# Patient Record
Sex: FEMALE | Race: WHITE | Employment: OTHER | URBAN - METROPOLITAN AREA
[De-identification: names, ages, dates, MRNs, and addresses within clinical notes are randomized per-mention and may not be internally consistent; named-entity substitution may affect disease eponyms.]

---

## 2017-01-13 DIAGNOSIS — I10 ESSENTIAL HYPERTENSION: ICD-10-CM

## 2017-01-13 RX ORDER — AMLODIPINE BESYLATE 10 MG/1
TABLET ORAL
Qty: 30 TABLET | Refills: 0 | Status: SHIPPED | OUTPATIENT
Start: 2017-01-13 | End: 2017-02-14 | Stop reason: SDUPTHER

## 2017-02-14 DIAGNOSIS — I10 ESSENTIAL HYPERTENSION: ICD-10-CM

## 2017-02-14 RX ORDER — AMLODIPINE BESYLATE 10 MG/1
TABLET ORAL
Qty: 30 TABLET | Refills: 0 | Status: SHIPPED | OUTPATIENT
Start: 2017-02-14 | End: 2017-03-17 | Stop reason: SDUPTHER

## 2017-02-14 RX ORDER — TIOTROPIUM BROMIDE 18 UG/1
CAPSULE ORAL; RESPIRATORY (INHALATION)
Qty: 30 CAPSULE | Refills: 0 | Status: SHIPPED | OUTPATIENT
Start: 2017-02-14 | End: 2017-03-17 | Stop reason: SDUPTHER

## 2017-03-17 DIAGNOSIS — I10 ESSENTIAL HYPERTENSION: ICD-10-CM

## 2017-03-17 RX ORDER — AMLODIPINE BESYLATE 10 MG/1
TABLET ORAL
Qty: 30 TABLET | Refills: 0 | Status: SHIPPED | OUTPATIENT
Start: 2017-03-17

## 2017-03-17 RX ORDER — TIOTROPIUM BROMIDE 18 UG/1
CAPSULE ORAL; RESPIRATORY (INHALATION)
Qty: 30 CAPSULE | Refills: 0 | Status: SHIPPED | OUTPATIENT
Start: 2017-03-17

## 2017-03-29 DIAGNOSIS — J44.1 COPD EXACERBATION (HCC): Primary | ICD-10-CM

## 2017-05-15 ENCOUNTER — HOSPITAL ENCOUNTER (OUTPATIENT)
Dept: PULMONOLOGY | Age: 77
Discharge: HOME | End: 2017-05-15
Attending: INTERNAL MEDICINE | Admitting: INTERNAL MEDICINE

## 2017-05-15 ENCOUNTER — OFFICE VISIT (OUTPATIENT)
Dept: PULMONOLOGY | Age: 77
End: 2017-05-15

## 2017-05-15 VITALS
BODY MASS INDEX: 34.91 KG/M2 | DIASTOLIC BLOOD PRESSURE: 62 MMHG | WEIGHT: 197 LBS | OXYGEN SATURATION: 92 % | TEMPERATURE: 97.7 F | HEART RATE: 108 BPM | HEIGHT: 63 IN | RESPIRATION RATE: 20 BRPM | SYSTOLIC BLOOD PRESSURE: 168 MMHG

## 2017-05-15 DIAGNOSIS — R91.1 LUNG NODULE: ICD-10-CM

## 2017-05-15 DIAGNOSIS — J96.11 CHRONIC RESPIRATORY FAILURE WITH HYPOXIA (HCC): ICD-10-CM

## 2017-05-15 DIAGNOSIS — J44.1 CHRONIC OBSTRUCTIVE PULMONARY DISEASE WITH ACUTE EXACERBATION (HCC): ICD-10-CM

## 2017-05-15 DIAGNOSIS — J43.2 CENTRILOBULAR EMPHYSEMA (HCC): Primary | ICD-10-CM

## 2017-05-15 LAB
DLCO %PRED: NORMAL
DLCO PRE: NORMAL
FEF 25-75%-POST: NORMAL
FEF 25-75%-PRE: NORMAL
FEV1-POST: NORMAL
FEV1-PRE: NORMAL
FEV1/FVC-POST: NORMAL
FEV1/FVC-PRE: NORMAL
FVC-POST: NORMAL
FVC-PRE: NORMAL
MEP: NORMAL
MIP: NORMAL
MVV %PRED-PRE: NORMAL
MVV-PRE: NORMAL
TLC %PRED: NORMAL
TLC PRE: NORMAL

## 2017-05-15 PROCEDURE — 3023F SPIROM DOC REV: CPT | Performed by: INTERNAL MEDICINE

## 2017-05-15 PROCEDURE — 99205 OFFICE O/P NEW HI 60 MIN: CPT | Performed by: INTERNAL MEDICINE

## 2017-05-15 PROCEDURE — 1090F PRES/ABSN URINE INCON ASSESS: CPT | Performed by: INTERNAL MEDICINE

## 2017-05-15 PROCEDURE — 94727 GAS DIL/WSHOT DETER LNG VOL: CPT | Performed by: INTERNAL MEDICINE

## 2017-05-15 PROCEDURE — G8926 SPIRO NO PERF OR DOC: HCPCS | Performed by: INTERNAL MEDICINE

## 2017-05-15 PROCEDURE — 1036F TOBACCO NON-USER: CPT | Performed by: INTERNAL MEDICINE

## 2017-05-15 PROCEDURE — G8417 CALC BMI ABV UP PARAM F/U: HCPCS | Performed by: INTERNAL MEDICINE

## 2017-05-15 PROCEDURE — G8427 DOCREV CUR MEDS BY ELIG CLIN: HCPCS | Performed by: INTERNAL MEDICINE

## 2017-05-15 PROCEDURE — 1123F ACP DISCUSS/DSCN MKR DOCD: CPT | Performed by: INTERNAL MEDICINE

## 2017-05-15 PROCEDURE — 94060 EVALUATION OF WHEEZING: CPT | Performed by: INTERNAL MEDICINE

## 2017-05-15 PROCEDURE — 94729 DIFFUSING CAPACITY: CPT | Performed by: INTERNAL MEDICINE

## 2017-05-15 PROCEDURE — G8400 PT W/DXA NO RESULTS DOC: HCPCS | Performed by: INTERNAL MEDICINE

## 2017-05-15 PROCEDURE — 4040F PNEUMOC VAC/ADMIN/RCVD: CPT | Performed by: INTERNAL MEDICINE

## 2017-05-15 RX ORDER — ALBUTEROL SULFATE 90 UG/1
4 AEROSOL, METERED RESPIRATORY (INHALATION) ONCE
Status: COMPLETED | OUTPATIENT
Start: 2017-05-15 | End: 2017-05-15

## 2017-05-15 RX ORDER — ALBUTEROL SULFATE 90 UG/1
2 AEROSOL, METERED RESPIRATORY (INHALATION) EVERY 4 HOURS PRN
Qty: 1 INHALER | Refills: 11 | Status: SHIPPED | OUTPATIENT
Start: 2017-05-15

## 2017-05-15 RX ORDER — LEVOFLOXACIN 500 MG/1
500 TABLET, FILM COATED ORAL DAILY
Qty: 5 TABLET | Refills: 0 | Status: SHIPPED | OUTPATIENT
Start: 2017-05-15 | End: 2017-05-20

## 2017-05-15 RX ORDER — ALBUTEROL SULFATE 2.5 MG/3ML
2.5 SOLUTION RESPIRATORY (INHALATION) EVERY 4 HOURS PRN
Qty: 360 ML | Refills: 11 | Status: CANCELLED | OUTPATIENT
Start: 2017-05-15

## 2017-05-15 RX ORDER — HYDROCHLOROTHIAZIDE 25 MG/1
25 TABLET ORAL DAILY
COMMUNITY
End: 2020-11-21

## 2017-05-15 RX ORDER — PREDNISONE 20 MG/1
40 TABLET ORAL DAILY
Qty: 10 TABLET | Refills: 0 | Status: SHIPPED | OUTPATIENT
Start: 2017-05-15 | End: 2017-05-20

## 2017-05-15 RX ADMIN — ALBUTEROL SULFATE 4 PUFF: 90 AEROSOL, METERED RESPIRATORY (INHALATION) at 09:37

## 2017-05-23 ENCOUNTER — HOSPITAL ENCOUNTER (OUTPATIENT)
Dept: CARDIAC REHAB | Age: 77
Discharge: HOME | End: 2017-05-23
Attending: INTERNAL MEDICINE | Admitting: INTERNAL MEDICINE

## 2017-05-23 ENCOUNTER — HOSPITAL ENCOUNTER (OUTPATIENT)
Dept: CT IMAGING | Age: 77
Discharge: HOME | End: 2017-05-23
Attending: INTERNAL MEDICINE | Admitting: INTERNAL MEDICINE

## 2017-05-23 DIAGNOSIS — R91.1 LUNG NODULE: ICD-10-CM

## 2017-05-23 DIAGNOSIS — R91.1 SOLITARY PULMONARY NODULE: ICD-10-CM

## 2017-05-23 PROCEDURE — 94620 PR PULMONARY STRESS TESTING,SIMPLE: CPT | Performed by: INTERNAL MEDICINE

## 2017-05-24 ENCOUNTER — TELEPHONE (OUTPATIENT)
Dept: PULMONOLOGY | Age: 77
End: 2017-05-24

## 2020-09-18 ENCOUNTER — HOSPITAL ENCOUNTER (INPATIENT)
Age: 80
LOS: 5 days | Discharge: HOME | DRG: 445 | End: 2020-09-23
Attending: EMERGENCY MEDICINE | Admitting: FAMILY MEDICINE
Payer: MEDICARE

## 2020-09-18 ENCOUNTER — APPOINTMENT (OUTPATIENT)
Dept: CT IMAGING | Age: 80
DRG: 445 | End: 2020-09-18
Payer: MEDICARE

## 2020-09-18 ENCOUNTER — APPOINTMENT (OUTPATIENT)
Dept: ULTRASOUND IMAGING | Age: 80
DRG: 445 | End: 2020-09-18
Payer: MEDICARE

## 2020-09-18 PROBLEM — K81.9 CHOLECYSTITIS: Status: ACTIVE | Noted: 2020-09-18

## 2020-09-18 LAB
A/G RATIO: 1.4 (ref 1.1–2.2)
ALBUMIN SERPL-MCNC: 4.1 G/DL (ref 3.4–5)
ALP BLD-CCNC: 114 U/L (ref 40–129)
ALT SERPL-CCNC: 12 U/L (ref 10–40)
ANION GAP SERPL CALCULATED.3IONS-SCNC: 8 MMOL/L (ref 3–16)
ANISOCYTOSIS: ABNORMAL
AST SERPL-CCNC: 16 U/L (ref 15–37)
BACTERIA: ABNORMAL /HPF
BASOPHILS ABSOLUTE: 0.1 K/UL (ref 0–0.2)
BASOPHILS RELATIVE PERCENT: 0.7 %
BILIRUB SERPL-MCNC: 0.4 MG/DL (ref 0–1)
BILIRUBIN URINE: NEGATIVE
BLOOD, URINE: NEGATIVE
BUN BLDV-MCNC: 19 MG/DL (ref 7–20)
CALCIUM SERPL-MCNC: 9.5 MG/DL (ref 8.3–10.6)
CHLORIDE BLD-SCNC: 94 MMOL/L (ref 99–110)
CLARITY: ABNORMAL
CO2: 35 MMOL/L (ref 21–32)
COLOR: YELLOW
CREAT SERPL-MCNC: 0.9 MG/DL (ref 0.6–1.2)
EKG ATRIAL RATE: 82 BPM
EKG DIAGNOSIS: NORMAL
EKG P AXIS: 61 DEGREES
EKG P-R INTERVAL: 178 MS
EKG Q-T INTERVAL: 392 MS
EKG QRS DURATION: 80 MS
EKG QTC CALCULATION (BAZETT): 457 MS
EKG R AXIS: 20 DEGREES
EKG T AXIS: 74 DEGREES
EKG VENTRICULAR RATE: 82 BPM
EOSINOPHILS ABSOLUTE: 0 K/UL (ref 0–0.6)
EOSINOPHILS RELATIVE PERCENT: 0.2 %
EPITHELIAL CELLS, UA: 7 /HPF (ref 0–5)
GFR AFRICAN AMERICAN: >60
GFR NON-AFRICAN AMERICAN: >60
GLOBULIN: 3 G/DL
GLUCOSE BLD-MCNC: 127 MG/DL (ref 70–99)
GLUCOSE URINE: NEGATIVE MG/DL
HCT VFR BLD CALC: 37.3 % (ref 36–48)
HEMOGLOBIN: 12.6 G/DL (ref 12–16)
HYALINE CASTS: 11 /LPF (ref 0–8)
KETONES, URINE: NEGATIVE MG/DL
LEUKOCYTE ESTERASE, URINE: ABNORMAL
LIPASE: 30 U/L (ref 13–60)
LYMPHOCYTES ABSOLUTE: 0.9 K/UL (ref 1–5.1)
LYMPHOCYTES RELATIVE PERCENT: 11.5 %
MCH RBC QN AUTO: 28.3 PG (ref 26–34)
MCHC RBC AUTO-ENTMCNC: 33.7 G/DL (ref 31–36)
MCV RBC AUTO: 83.8 FL (ref 80–100)
MICROSCOPIC EXAMINATION: YES
MONOCYTES ABSOLUTE: 0.5 K/UL (ref 0–1.3)
MONOCYTES RELATIVE PERCENT: 6.1 %
NEUTROPHILS ABSOLUTE: 6.6 K/UL (ref 1.7–7.7)
NEUTROPHILS RELATIVE PERCENT: 81.5 %
NITRITE, URINE: NEGATIVE
PDW BLD-RTO: 14.5 % (ref 12.4–15.4)
PH UA: 6.5 (ref 5–8)
PLATELET # BLD: 46 K/UL (ref 135–450)
PLATELET SLIDE REVIEW: ABNORMAL
PMV BLD AUTO: 13.2 FL (ref 5–10.5)
POTASSIUM SERPL-SCNC: 3.1 MMOL/L (ref 3.5–5.1)
PROTEIN UA: NEGATIVE MG/DL
RBC # BLD: 4.45 M/UL (ref 4–5.2)
RBC UA: 2 /HPF (ref 0–4)
REASON FOR REJECTION: NORMAL
REJECTED TEST: NORMAL
SLIDE REVIEW: ABNORMAL
SODIUM BLD-SCNC: 137 MMOL/L (ref 136–145)
SPECIFIC GRAVITY UA: 1.02 (ref 1–1.03)
TOTAL PROTEIN: 7.1 G/DL (ref 6.4–8.2)
TROPONIN: <0.01 NG/ML
URINE REFLEX TO CULTURE: YES
URINE TYPE: ABNORMAL
UROBILINOGEN, URINE: 0.2 E.U./DL
WBC # BLD: 8.1 K/UL (ref 4–11)
WBC UA: 61 /HPF (ref 0–5)

## 2020-09-18 PROCEDURE — 99222 1ST HOSP IP/OBS MODERATE 55: CPT | Performed by: SURGERY

## 2020-09-18 PROCEDURE — 81001 URINALYSIS AUTO W/SCOPE: CPT

## 2020-09-18 PROCEDURE — 2500000003 HC RX 250 WO HCPCS: Performed by: SURGERY

## 2020-09-18 PROCEDURE — 6360000004 HC RX CONTRAST MEDICATION: Performed by: NURSE PRACTITIONER

## 2020-09-18 PROCEDURE — 2500000003 HC RX 250 WO HCPCS: Performed by: EMERGENCY MEDICINE

## 2020-09-18 PROCEDURE — 87040 BLOOD CULTURE FOR BACTERIA: CPT

## 2020-09-18 PROCEDURE — APPSS60 APP SPLIT SHARED TIME 46-60 MINUTES: Performed by: NURSE PRACTITIONER

## 2020-09-18 PROCEDURE — 74177 CT ABD & PELVIS W/CONTRAST: CPT

## 2020-09-18 PROCEDURE — 96374 THER/PROPH/DIAG INJ IV PUSH: CPT

## 2020-09-18 PROCEDURE — 6370000000 HC RX 637 (ALT 250 FOR IP): Performed by: NURSE PRACTITIONER

## 2020-09-18 PROCEDURE — 83690 ASSAY OF LIPASE: CPT

## 2020-09-18 PROCEDURE — 93005 ELECTROCARDIOGRAM TRACING: CPT | Performed by: NURSE PRACTITIONER

## 2020-09-18 PROCEDURE — 6360000002 HC RX W HCPCS: Performed by: FAMILY MEDICINE

## 2020-09-18 PROCEDURE — 6370000000 HC RX 637 (ALT 250 FOR IP): Performed by: FAMILY MEDICINE

## 2020-09-18 PROCEDURE — 96375 TX/PRO/DX INJ NEW DRUG ADDON: CPT

## 2020-09-18 PROCEDURE — 87186 SC STD MICRODIL/AGAR DIL: CPT

## 2020-09-18 PROCEDURE — 99285 EMERGENCY DEPT VISIT HI MDM: CPT

## 2020-09-18 PROCEDURE — 2580000003 HC RX 258: Performed by: FAMILY MEDICINE

## 2020-09-18 PROCEDURE — 6360000002 HC RX W HCPCS: Performed by: SURGERY

## 2020-09-18 PROCEDURE — 87086 URINE CULTURE/COLONY COUNT: CPT

## 2020-09-18 PROCEDURE — 85025 COMPLETE CBC W/AUTO DIFF WBC: CPT

## 2020-09-18 PROCEDURE — APPNB30 APP NON BILLABLE TIME 0-30 MINS: Performed by: NURSE PRACTITIONER

## 2020-09-18 PROCEDURE — 76705 ECHO EXAM OF ABDOMEN: CPT

## 2020-09-18 PROCEDURE — 84484 ASSAY OF TROPONIN QUANT: CPT

## 2020-09-18 PROCEDURE — 6360000002 HC RX W HCPCS: Performed by: NURSE PRACTITIONER

## 2020-09-18 PROCEDURE — 80053 COMPREHEN METABOLIC PANEL: CPT

## 2020-09-18 PROCEDURE — 1200000000 HC SEMI PRIVATE

## 2020-09-18 PROCEDURE — 87077 CULTURE AEROBIC IDENTIFY: CPT

## 2020-09-18 PROCEDURE — 36415 COLL VENOUS BLD VENIPUNCTURE: CPT

## 2020-09-18 PROCEDURE — 93010 ELECTROCARDIOGRAM REPORT: CPT | Performed by: INTERNAL MEDICINE

## 2020-09-18 PROCEDURE — 2580000003 HC RX 258: Performed by: NURSE PRACTITIONER

## 2020-09-18 RX ORDER — ONDANSETRON 2 MG/ML
4 INJECTION INTRAMUSCULAR; INTRAVENOUS EVERY 6 HOURS PRN
Status: DISCONTINUED | OUTPATIENT
Start: 2020-09-18 | End: 2020-09-23 | Stop reason: HOSPADM

## 2020-09-18 RX ORDER — MORPHINE SULFATE 4 MG/ML
4 INJECTION, SOLUTION INTRAMUSCULAR; INTRAVENOUS EVERY 4 HOURS PRN
Status: DISCONTINUED | OUTPATIENT
Start: 2020-09-18 | End: 2020-09-23 | Stop reason: HOSPADM

## 2020-09-18 RX ORDER — POTASSIUM CHLORIDE 20 MEQ/1
40 TABLET, EXTENDED RELEASE ORAL 2 TIMES DAILY WITH MEALS
Status: DISCONTINUED | OUTPATIENT
Start: 2020-09-18 | End: 2020-09-23 | Stop reason: HOSPADM

## 2020-09-18 RX ORDER — LEVOFLOXACIN 5 MG/ML
500 INJECTION, SOLUTION INTRAVENOUS EVERY 24 HOURS
Status: DISCONTINUED | OUTPATIENT
Start: 2020-09-18 | End: 2020-09-18 | Stop reason: ALTCHOICE

## 2020-09-18 RX ORDER — SODIUM CHLORIDE 9 MG/ML
INJECTION, SOLUTION INTRAVENOUS CONTINUOUS
Status: DISCONTINUED | OUTPATIENT
Start: 2020-09-18 | End: 2020-09-22

## 2020-09-18 RX ORDER — BUDESONIDE AND FORMOTEROL FUMARATE DIHYDRATE 160; 4.5 UG/1; UG/1
2 AEROSOL RESPIRATORY (INHALATION) 2 TIMES DAILY
Status: DISCONTINUED | OUTPATIENT
Start: 2020-09-18 | End: 2020-09-23 | Stop reason: HOSPADM

## 2020-09-18 RX ORDER — SODIUM CHLORIDE 0.9 % (FLUSH) 0.9 %
10 SYRINGE (ML) INJECTION EVERY 12 HOURS SCHEDULED
Status: DISCONTINUED | OUTPATIENT
Start: 2020-09-18 | End: 2020-09-23 | Stop reason: HOSPADM

## 2020-09-18 RX ORDER — CIPROFLOXACIN 2 MG/ML
400 INJECTION, SOLUTION INTRAVENOUS EVERY 12 HOURS
Status: DISCONTINUED | OUTPATIENT
Start: 2020-09-18 | End: 2020-09-20

## 2020-09-18 RX ORDER — 0.9 % SODIUM CHLORIDE 0.9 %
1000 INTRAVENOUS SOLUTION INTRAVENOUS ONCE
Status: COMPLETED | OUTPATIENT
Start: 2020-09-18 | End: 2020-09-18

## 2020-09-18 RX ORDER — PROMETHAZINE HYDROCHLORIDE 25 MG/1
12.5 TABLET ORAL EVERY 6 HOURS PRN
Status: DISCONTINUED | OUTPATIENT
Start: 2020-09-18 | End: 2020-09-23 | Stop reason: HOSPADM

## 2020-09-18 RX ORDER — POLYETHYLENE GLYCOL 3350 17 G/17G
17 POWDER, FOR SOLUTION ORAL DAILY PRN
Status: DISCONTINUED | OUTPATIENT
Start: 2020-09-18 | End: 2020-09-19 | Stop reason: DRUGHIGH

## 2020-09-18 RX ORDER — ONDANSETRON 2 MG/ML
4 INJECTION INTRAMUSCULAR; INTRAVENOUS ONCE
Status: COMPLETED | OUTPATIENT
Start: 2020-09-18 | End: 2020-09-18

## 2020-09-18 RX ORDER — SODIUM CHLORIDE 0.9 % (FLUSH) 0.9 %
10 SYRINGE (ML) INJECTION PRN
Status: DISCONTINUED | OUTPATIENT
Start: 2020-09-18 | End: 2020-09-23 | Stop reason: HOSPADM

## 2020-09-18 RX ORDER — ACETAMINOPHEN 650 MG/1
650 SUPPOSITORY RECTAL EVERY 6 HOURS PRN
Status: DISCONTINUED | OUTPATIENT
Start: 2020-09-18 | End: 2020-09-23 | Stop reason: HOSPADM

## 2020-09-18 RX ORDER — ACETAMINOPHEN 325 MG/1
650 TABLET ORAL EVERY 6 HOURS PRN
Status: DISCONTINUED | OUTPATIENT
Start: 2020-09-18 | End: 2020-09-23 | Stop reason: HOSPADM

## 2020-09-18 RX ORDER — AMLODIPINE BESYLATE 5 MG/1
10 TABLET ORAL DAILY
Status: DISCONTINUED | OUTPATIENT
Start: 2020-09-18 | End: 2020-09-23 | Stop reason: HOSPADM

## 2020-09-18 RX ORDER — HYDRALAZINE HYDROCHLORIDE 20 MG/ML
5 INJECTION INTRAMUSCULAR; INTRAVENOUS EVERY 4 HOURS PRN
Status: DISCONTINUED | OUTPATIENT
Start: 2020-09-18 | End: 2020-09-23 | Stop reason: HOSPADM

## 2020-09-18 RX ADMIN — CIPROFLOXACIN 400 MG: 2 INJECTION, SOLUTION INTRAVENOUS at 18:47

## 2020-09-18 RX ADMIN — Medication 1 G: at 11:16

## 2020-09-18 RX ADMIN — SODIUM CHLORIDE: 9 INJECTION, SOLUTION INTRAVENOUS at 18:47

## 2020-09-18 RX ADMIN — ONDANSETRON 4 MG: 2 INJECTION INTRAMUSCULAR; INTRAVENOUS at 11:23

## 2020-09-18 RX ADMIN — AMLODIPINE BESYLATE 10 MG: 5 TABLET ORAL at 18:41

## 2020-09-18 RX ADMIN — IOPAMIDOL 75 ML: 755 INJECTION, SOLUTION INTRAVENOUS at 11:31

## 2020-09-18 RX ADMIN — SODIUM CHLORIDE 1000 ML: 9 INJECTION, SOLUTION INTRAVENOUS at 11:12

## 2020-09-18 RX ADMIN — POTASSIUM CHLORIDE 40 MEQ: 1500 TABLET, EXTENDED RELEASE ORAL at 11:16

## 2020-09-18 RX ADMIN — METRONIDAZOLE 500 MG: 500 INJECTION, SOLUTION INTRAVENOUS at 15:21

## 2020-09-18 RX ADMIN — HYDRALAZINE HYDROCHLORIDE 5 MG: 20 INJECTION INTRAMUSCULAR; INTRAVENOUS at 23:00

## 2020-09-18 RX ADMIN — METRONIDAZOLE 500 MG: 500 INJECTION, SOLUTION INTRAVENOUS at 23:10

## 2020-09-18 ASSESSMENT — PAIN DESCRIPTION - PAIN TYPE: TYPE: ACUTE PAIN

## 2020-09-18 ASSESSMENT — PAIN DESCRIPTION - FREQUENCY: FREQUENCY: CONTINUOUS

## 2020-09-18 ASSESSMENT — PAIN SCALES - GENERAL
PAINLEVEL_OUTOF10: 5
PAINLEVEL_OUTOF10: 7

## 2020-09-18 ASSESSMENT — PAIN - FUNCTIONAL ASSESSMENT: PAIN_FUNCTIONAL_ASSESSMENT: ACTIVITIES ARE NOT PREVENTED

## 2020-09-18 ASSESSMENT — PAIN DESCRIPTION - PROGRESSION: CLINICAL_PROGRESSION: NOT CHANGED

## 2020-09-18 ASSESSMENT — PAIN DESCRIPTION - DESCRIPTORS: DESCRIPTORS: HEADACHE

## 2020-09-18 ASSESSMENT — PAIN DESCRIPTION - ONSET: ONSET: ON-GOING

## 2020-09-18 ASSESSMENT — PAIN DESCRIPTION - LOCATION: LOCATION: HEAD

## 2020-09-18 NOTE — ED NOTES
Pt assisted to the rest room via wheelchair, Pt ambulates with steady gait. Pt returned to bed. No signs of distress. Pt respirations even and easy. Pt is on a continuous pulse oximetry and telemetry monitoring. Pt continued on cycling blood pressure. Fall risk precautions in place, call light in reach, bed side table within reach, will continue to monitor.          Ara Ratliff RN  09/18/20 6191

## 2020-09-18 NOTE — CONSULTS
[facility]     Consult                                                     Patient Name: Kinza Davenport  MRN: [id number]  [place]: 1940  Admission date: 9/18/2020  9:12 AM   Date of evaluation: 9/18/2020  Primary Care Physician: Garret Ansari  Requesting physician: Marietta BRITT  Reason for consult: Abdominal pain  History of Present Illness:    Ms. Tsoha Sanchez is a 79 y.o. female who presents with 3 day histoy of sharp right upper quadrant pain. Constant, progressively worse. Worse with eating and at the end of the day, nothing makes it better. Does not radiate and has never happened before. Associated with nausea, dry heaves, boating, anorexia, constipation, easy bruising. Denies fevers, chills, chest pain, dyspnea/cough other than baseline, jaundice, dysuria or other complaints. Workup in ED consistent with UTI, cholecystitis, thrombocytopenia, left renal mass. Medical history includes COPD on home oxygen. History of colon cancer s/p right hemicolectomy and not had colonoscopy in more than 10 years    Past Medical History:        Diagnosis Date    Cataracts, bilateral     Cervical cancer screening 2000    Nml per pt'    COPD (chronic obstructive pulmonary disease) ([facility] [address]     Portable oxygen:2 L/min    Gall stones     H/O colonoscopy 1990    polyp    H/O mammogram 2013    Nml per pt'.  Hypertension 2010       Past Surgical History:        Procedure Laterality Date    COLON SURGERY  1990    colon cancer:s/p surgery per pt'       Scheduled Meds:   potassium chloride  40 mEq Oral BID WC    metroNIDAZOLE  500 mg Intravenous Once     Continuous Infusions:  PRN Meds:. Prior to Admission medications    Medication Sig Start Date End Date Taking?  Authorizing Provider   fluticasone-salmeterol (ADVAIR DISKUS) 250-50 MCG/DOSE AEPB Inhale 1 puff into the lungs every 12 hours   Yes Historical Provider, MD   hydrochlorothiazide (HYDRODIURIL) 25 MG tablet Take 25 mg by Physically abused: None     Forced sexual activity: None   Other Topics Concern    None   Social History Narrative    None       Family History:    Family History   Problem Relation Age of Onset    Heart Disease Father     No Known Problems Mother     Cancer Sister     Arrhythmia Brother     No Known Problems Maternal Grandmother     No Known Problems Maternal Grandfather     No Known Problems Paternal Grandmother     No Known Problems Paternal Grandfather     No Known Problems Other     Cancer Sister     Cancer Brother     Cancer Brother        Review of Systems:  CONSTITUTIONAL:  Negative except as above  HEENT:  negative  RESPIRATORY:  negative  CARDIOVASCULAR:  negative  GASTROINTESTINAL:  negative except as above   GENITOURINARY:  negative  HEMATOLOGIC/LYMPHATIC:  negative  NEUROLOGICAL:  Negative      Vital Signs:  Patient Vitals for the past 24 hrs:   BP Temp Temp src Pulse Resp SpO2 Height Weight   20 1315 (!) 162/50 -- -- -- -- 100 % -- --   20 1215 (!) 166/48 -- -- 78 12 100 % -- --   20 0921 (!) 185/71 97.9 °F (36.6 °C) Temporal 100 18 99 % 4' 9\" (1.448 m) 190 lb (86.2 kg)      TEMPERATURE HISTORY 24H: Temp (24hrs), Av.9 °F (36.6 °C), Min:97.9 °F (36.6 °C), Max:97.9 °F (36.6 °C)    BLOOD PRESSURE HISTORY: Systolic (18QWH), OQJ:125 , Min:162 , GXT:218    Diastolic (53JTW), TBX:85, Min:48, Max:71    Admission Weight: 190 lb (86.2 kg)     No intake or output data in the 24 hours ending 20 1423  Drain/tube Output:         Physical Exam:  CONSTITUTIONAL:  alert, no apparent distress   NEUROLOGIC:  Mental Status Exam:  Level of Alertness:   awake  Orientation:  Oriented to person, place, time  EYES:  sclera clear  HENT:  normocepalic, without obvious abnormality  NECK:  supple, symmetrical, trachea midline  LUNGS:  clear to auscultation  CARDIOVASCULAR:  regular rate and rhythm   ABDOMEN: soft, mild distension, mild right upper quadrant tenderness without peritonitis  SKIN:  no bruising or bleeding, normal skin color, texture, turgor and no redness, warmth, or swelling      Labs:    CBC:    Recent Labs     09/18/20  1039   WBC 8.1   HGB 12.6   HCT 37.3   PLT 46*     BMP:    Recent Labs     09/18/20  1039      K 3.1*   CL 94*   CO2 35*   BUN 19   CREATININE 0.9   GLUCOSE 127*     Hepatic:   Recent Labs     09/18/20  1039   AST 16   ALT 12   BILITOT 0.4   ALKPHOS 114     Amylase: No results for input(s): AMYLASE in the last 72 hours. Lipase:   Recent Labs     09/18/20  1039   LIPASE 30.0     Mag:    No results for input(s): MG in the last 72 hours. Phos:   No results for input(s): PHOS in the last 72 hours. Coags: No results for input(s): INR, APTT in the last 72 hours. Imaging:  I have personally reviewed the following films:    Ct Abdomen Pelvis W Iv Contrast Additional Contrast? None    Result Date: 9/18/2020  EXAMINATION: CT OF THE ABDOMEN AND PELVIS WITH CONTRAST 9/18/2020 11:13 am TECHNIQUE: CT of the abdomen and pelvis was performed with the administration of intravenous contrast. Multiplanar reformatted images are provided for review. Dose modulation, iterative reconstruction, and/or weight based adjustment of the mA/kV was utilized to reduce the radiation dose to as low as reasonably achievable. COMPARISON: None. HISTORY: ORDERING SYSTEM PROVIDED HISTORY: abd pain, distention, r/o SBO TECHNOLOGIST PROVIDED HISTORY: Reason for exam:->abd pain, distention, r/o SBO Additional Contrast?->None Reason for Exam: abd pain, distention, r/o SBO Acuity: Acute Type of Exam: Initial Former smoker with 30 pack year history. FINDINGS: Lower Chest: Inferior lung bases are clear. Heart size is normal. Organs: Liver is normal in appearance without worrisome focal lesion. Prominent gallstone in the neck of the gallbladder. Gallbladder wall is thickened and mild pericholecystic inflammatory stranding present.   13 mm low-density lesion posteriorly in the upper left kidney not clearly a simple cyst.  Very small cortical simple cyst posteriorly in the upper right kidney. Other abdominal organs appear normal. GI/Bowel: Prior right hemicolectomy with entero colonic anastomosis anterior to the mid liver. No bowel obstruction. Severe sigmoid diverticulosis without diverticulitis. Pelvis: Uterus remains and appears normal for age. Urinary bladder appears normal.  No free fluid or adenopathy. Peritoneum/Retroperitoneum: No mass, adenopathy, or ascites. Normal size of the aorta. No free air. Bones/Soft Tissues: No acute abnormality. Cholelithiasis and findings of mild acute cholecystitis. Indeterminate 13 mm cystic appearing lesion in the posterior upper left kidney. Lesion not seen on CT chest 05/23/2017. No other significant abnormality. RECOMMENDATIONS: Non urgent renal ultrasound suggested for further evaluation the left renal lesion. Cultures:  Relevant cultures documented under results section     Assessment:  Patient Active Problem List   Diagnosis    Acute on chronic respiratory failure (HCC)    Chronic obstructive pulmonary disease with acute exacerbation (HCC)    HTN (hypertension)    Enlarged thyroid & cyst per CT chest 6/2015    Centrilobular emphysema (HCC)    Chronic respiratory failure with hypoxia (HCC)    Solitary pulmonary nodule     Cholecystitis  UTI  Thrombocytopenia  COPD on home oxygen  Left renal mass  History of colon cancer    Plan:  1. Bowel rest  2. Antibiotics to cover both UTI and cholecystitis  3. Needs medical stabilization prior to consideration of surgical cholecystectomy. If responds well to medical management, possible consider cholecystectomy later this admission vs electively. If decompensates prior to medical stabilization, may consider cholecystostomy drain  4. Check CEA, monitor constipation  5. IVF  6.  Recommend hematology evaluation for thrombocytopenia, will check hepatitis panel as patient does have remote history of

## 2020-09-18 NOTE — ED PROVIDER NOTES
[facility]        Pt Name: Tenisha Pena  MRN: [id number]  [place] 1940  Date of evaluation: 9/18/2020  Provider: SHAN Ferraro CNP  PCP: Garret Ansari     I have seen and evaluated this patient with my supervising physician Kelly Aldridge, [address] [facility]       Chief Complaint   Patient presents with    Abdominal Pain     Patient with c/o abdominal pain x3 days. Describes it as shooting pains in her RUQ. Also states she hasn't moved her bowels in 3 days, usually goes everyday       HISTORY OF PRESENT ILLNESS   (Location, Timing/Onset, Context/Setting, Quality, Duration, Modifying Factors, Severity, Associated Signs and Symptoms)  Note limiting factors. Tenisha Pena is a 79 y.o. female with medical history of COPD on 2L O2, gallstones, luminary nodule, centrilobular emphysema, hypertension who presents the ED with complaints of underlies abdominal discomfort with distention over the past 3 days. Patient notes that her last bowel movement was approximately 3 days and she does feel the urge to have a bowel movement, however has not been passing gas. She did note pain into her right upper quadrant after eating a turkey sandwich yesterday. She went to bed at approximately 1900 thinking the pain would subside, however it did not. She did take ASA 81 mg without relief. She does note a remote history of colon cancer approximately 30 years ago whenever she had a colon resection. She has not required any additional chemo or radiation. She denies an EGD or colonoscopy since the surgery. She denies being anticoagulated. Denies any dietary changes. Denies any associated short of air, cough, fevers, nausea, vomiting, urinary symptoms, lightheaded, dizzy, or syncope. Nursing Notes were all reviewed and agreed with or any disagreements were addressed in the HPI.     REVIEW OF SYSTEMS    (2-9 systems for level 4, 10 or more for level 5)     Review of Systems    Positives and Pertinent negatives as per HPI. Except as noted above in the ROS, all other systems were reviewed and negative. PAST MEDICAL HISTORY     Past Medical History:   Diagnosis Date    Cataracts, bilateral     Cervical cancer screening 2000    Nml per pt'    COPD (chronic obstructive pulmonary disease) ([facility] [address]     Portable oxygen:2 L/min    Gall stones     H/O colonoscopy 1990    polyp    H/O mammogram 2013    Nml per pt'.  Hypertension 2010         SURGICAL HISTORY     Past Surgical History:   Procedure Laterality Date    COLON SURGERY  1990    colon cancer:s/p surgery per pt'         CURRENTMEDICATIONS       Previous Medications    ALBUTEROL SULFATE  (90 BASE) MCG/ACT INHALER    Inhale 2 puffs into the lungs every 4 hours as needed for Wheezing or Shortness of Breath    AMLODIPINE (NORVASC) 10 MG TABLET    TAKE 1 TABLET BY MOUTH DAILY    FLUTICASONE-SALMETEROL (ADVAIR DISKUS) 250-50 MCG/DOSE AEPB    Inhale 1 puff into the lungs every 12 hours    HYDROCHLOROTHIAZIDE (HYDRODIURIL) 25 MG TABLET    Take 25 mg by mouth daily    KETOCONAZOLE (NIZORAL) 2 % CREAM    Apply topically daily Apply topically daily.     METOPROLOL TARTRATE (LOPRESSOR) 25 MG TABLET    Take 1 tablet by mouth 2 times daily    SPIRIVA HANDIHALER 18 MCG INHALATION CAPSULE    INHALE 1 CAPSULE INTO THE LUNGS DAILY USING THE HANDIHALER         ALLERGIES     Azithromycin and Codeine    FAMILYHISTORY       Family History   Problem Relation Age of Onset    Heart Disease Father     No Known Problems Mother     Cancer Sister     Arrhythmia Brother     No Known Problems Maternal Grandmother     No Known Problems Maternal Grandfather     No Known Problems Paternal Grandmother     No Known Problems Paternal Grandfather     No Known Problems Other     Cancer Sister     Cancer Brother     Cancer Brother           SOCIAL HISTORY       Social History     Tobacco Use  Smoking status: Former Smoker     Packs/day: 1.00     Years: 30.00     Pack years: 30.00     Types: Cigarettes     Last attempt to quit: 10/14/1995     Years since quittin.9    Smokeless tobacco: Never Used   Substance Use Topics    Alcohol use: No    Drug use: No       SCREENINGS             PHYSICAL EXAM    (up to 7 for level 4, 8 or more for level 5)     ED Triage Vitals [20 0921]   Enc Vitals Group      BP (!) 185/71      Pulse 100      Resp 18      Temp 97.9 °F (36.6 °C)      Temp Source Temporal      SpO2 99 %      Weight 190 lb (86.2 kg)      Height 4' 9\" (1.448 m)         Physical Exam  Vitals signs and nursing note reviewed. Constitutional:       General: She is awake. Appearance: Normal appearance. She is well-developed and overweight. Interventions: Nasal cannula in place. HENT:      Head: Normocephalic and atraumatic. Nose: Nose normal.   Eyes:      General:         Right eye: No discharge. Left eye: No discharge. Neck:      Musculoskeletal: Normal range of motion. Cardiovascular:      Rate and Rhythm: Normal rate and regular rhythm. Heart sounds: Normal heart sounds. Pulmonary:      Effort: Pulmonary effort is normal. No respiratory distress. Breath sounds: Normal breath sounds. Abdominal:      General: Bowel sounds are normal.      Palpations: Abdomen is soft. Tenderness: There is abdominal tenderness in the right upper quadrant. Musculoskeletal: Normal range of motion. Skin:     General: Skin is warm and dry. Coloration: Skin is not pale. Neurological:      Mental Status: She is alert and oriented to person, place, and time. Psychiatric:         Behavior: Behavior normal. Behavior is cooperative.          DIAGNOSTIC RESULTS   LABS:    Labs Reviewed   URINE RT REFLEX TO CULTURE - Abnormal; Notable for the following components:       Result Value    Clarity, UA TURBID (*)     Leukocyte Esterase, Urine MODERATE (*)     All other components within normal limits    Narrative:     Performed at:  [facility] [address], [facility], [address]   Phone [phone]   CBC WITH AUTO DIFFERENTIAL - Abnormal; Notable for the following components:    Platelets 46 (*)     MPV 13.2 (*)     Lymphocytes Absolute 0.9 (*)     Anisocytosis Occasional (*)     All other components within normal limits    Narrative:     Performed at:  [facility] [address], [facility], [address]   Phone [phone]   COMPREHENSIVE METABOLIC PANEL - Abnormal; Notable for the following components:    Potassium 3.1 (*)     Chloride 94 (*)     CO2 35 (*)     Glucose 127 (*)     All other components within normal limits    Narrative:     Performed at:  [facility] [address], [facility], [address]   Phone [phone]   MICROSCOPIC URINALYSIS - Abnormal; Notable for the following components:    Bacteria, UA 4+ (*)     Hyaline Casts, UA 11 (*)     WBC, UA 61 (*)     Epithelial Cells, UA 7 (*)     All other components within normal limits    Narrative:     Performed at:  [facility] [address], [facility], [address]   Phone [phone]   CULTURE, URINE   CULTURE, BLOOD 2   CULTURE, BLOOD 1   SPECIMEN REJECTION    Narrative:     Danilo Hannon  [facility] tel. [phone],  Rejected [employer]/Called to:maldonado ahuja, 09/18/2020 10:13, by Samir Coe  Performed at:  [facility] [address], [facility], [address]   Phone [phone]   LIPASE    Narrative:     Performed at:  [facility] [address], [facility], [address]   Phone [phone]   TROPONIN    Narrative:     Performed at:  [facility] [address], [facility], [address]   Phone [phone]       All other labs were within normal range or not returned as of this dictation. EKG: All EKG's are interpreted by the Emergency Department Physician in the absence of a cardiologist.  Please see their note for interpretation of EKG. RADIOLOGY:   Non-plain film images such as CT, Ultrasound and MRI are read by the radiologist. Plain radiographic images are visualized and preliminarily interpreted by the ED Provider with the below findings:        Interpretation per the Radiologist below, if available at the time of this note:    CT ABDOMEN PELVIS W IV CONTRAST Additional Contrast? None   Final Result   Cholelithiasis and findings of mild acute cholecystitis. Indeterminate 13 mm cystic appearing lesion in the posterior upper left   kidney. Lesion not seen on CT chest 05/23/2017. No other significant   abnormality. RECOMMENDATIONS:   Non urgent renal ultrasound suggested for further evaluation the left renal   lesion. No results found. PROCEDURES   Unless otherwise noted below, none     Procedures    CRITICAL CARE TIME   N/A    CONSULTS:  IP CONSULT TO GENERAL SURGERY  IP CONSULT TO HOSPITALIST      EMERGENCY DEPARTMENT COURSE and DIFFERENTIAL DIAGNOSIS/MDM:   Vitals:    Vitals:    09/18/20 1215 09/18/20 1315 09/18/20 1500 09/18/20 1515   BP: (!) 166/48 (!) 162/50 (!) 141/55 (!) 165/85   Pulse: 78   80   Resp: 12   14   Temp:       TempSrc:       SpO2: 100% 100% 100% 100%   Weight:       Height:           Patient was given the following medications:  Medications   0.9 % sodium chloride bolus (1,000 mLs Intravenous New Bag 9/18/20 1112)   iopamidol (ISOVUE-370) 76 % injection 75 mL (75 mLs Intravenous Given 9/18/20 1131)   cefTRIAXone (ROCEPHIN) 1 g in sterile water 10 mL IV syringe (1 g Intravenous Given 9/18/20 1116)   ondansetron (ZOFRAN) injection 4 mg (4 mg Intravenous Given 9/18/20 1123)   metronidazole (FLAGYL) 500 mg in NaCl 100 mL IVPB premix (500 mg Intravenous New Bag 9/18/20 1521)           Pertinent Labs & Imaging studies reviewed.  (See chart for details)   -  Patient seen and evaluated in the emergency department. -  Triage and nursing notes reviewed and incorporated. -  Old chart records reviewed and incorporated. -  Patient case discussed with attending physician, Dr. Shania Jansen. They saw and examined patient. -  Differential diagnosis includes:  Cholelithiasis, cholangitis, pancreatitis, UTI, pyelonephritis, SBO, diverticulitis, colon cancer, Crohn's disease, ulcerative colitis, IBS, dehydration, verse COVID-19  -  Work-up included:  See above CBC, CMP, UA, lipase, troponin, CT abdomen pelvis with contrast, EKG  -  ED treatment included:   Normal saline, potassium, Rocephin  - Consults: hospitalist, Dr. Hilaria Guzman   -  Results discussed with patient. Labs show  UA shows turbid clarity, moderate leukocytes, 4+ bacteria, 11 hyaline cast, 61 WBC with 7 epithelial cells. Urine culture pending at this time. Troponin negative. Lipase 30. CMP with potassium 3.1, chloride 94, CO2 35, glucose 127. CBC with platelets 46 as this is a new finding. CT abdomen pelvis with IV contrast shows cholelithiasis and findings of mild acute cholecystitis. Indeterminate 13 mm cystic-appearing lesion in the posterior of her left kidney. No other significant abnormality. The patient is agreeable with plan of care and disposition.  -  Disposition:   admission    FINAL IMPRESSION      1. Biliary calculus of other site without obstruction    2. Hypokalemia    3. Acute UTI    4. Cholecystitis    5. Kidney lesion, native, left    6.  Thrombocytopenia ([facility] [address]          DISPOSITION/PLAN   DISPOSITION    Admission         (Please note that portions of this note were completed with a voice recognition program.  Efforts were made to edit the dictations but occasionally words are mis-transcribed.)    SHAN Perkins CNP (electronically signed)            SHAN Perkins CNP  09/18/20 9968

## 2020-09-18 NOTE — CARE COORDINATION
Discharge Planning Assessment    SW discharge planner met with patient to discuss reason for admission, current living situation, and potential needs at the time of discharge. Pt in ED d/t bilary calculus    Demographics/Insurance verified:  Yes    Current type of dwellin floor apartment - pt lives on 1st level. Living arrangements:  W/dtr who lives on second level. Level of function/Support:  Pt reports she is independent with ADLs. Stated dtr is supportive. PCP:  Dar     Last Visit to PCP:  Pt stated \"more than a year\"    DME:  Oxygen ([employer])    Active with any community resources/agencies/skilled home care:  None. Pt reported no non-skilled needs. Medication compliance issues:  No    Financial issues that could impact healthcare:  No    Tentative discharge plan:  Home most likely. Pt active w/oxygen from [employer]. No other needs identified at this time. Discussed with patient and/or family that on the day of discharge home tentative time of discharge will be between 10 AM and noon. Transportation at the time of discharge:  Dtr can transport.     Electronically signed by DONNA Garcia, DORON on 2020 at 4:14 PM

## 2020-09-18 NOTE — PLAN OF CARE
[facility]        Assessment & Plan of Care    History of Present Illness: Ms. Tricia Eason is a 79 y.o. female who presents with a 3 day history of sharp right upper quadrant abdominal pain that has been constant and progressive since onset. The pain does not radiate but patient reports discomfort across the upper abdomen which she relates to bloating. Pain is worse after eating and is noticed more at bedtime; no alleviating factors noted. Other associated symptoms include nausea, dry heaves, decreased appetite, and constipation. Last bowel movement was a couple of days ago; normally bowels move daily. She denies fevers, chills, diarrhea, urinary symptoms, postprandial pain, chest pain, cough/SOB more than her baseline, or similar symptoms in the past.  She has not had any associated jaundice but did report jaundice around age 14, at which time she believes she was treated for hepatitis. Prior abdominal surgery includes a right colectomy for colon cancer. Medical history includes hypertension, COPD, home oxygen use, and reports that she bruises easily. She is not diabetic; denies any known issues with thrombocytopenia. Last colonoscopy was greater than 10 years ago. No blood thinners. Former smoker.     Physical Exam:  CONSTITUTIONAL:  alert, no apparent distress and morbidly obese  NEUROLOGIC:  Mental Status Exam:  Level of Alertness:   awake  Orientation:   person, place, time  EYES:  sclera clear  ENT:  normocepalic, without obvious abnormality  NECK:  supple, symmetrical, trachea midline  LUNGS:  clear to auscultation  CARDIOVASCULAR:  regular rate and rhythm and no murmur noted  ABDOMEN: soft, distended, tenderness noted in the right upper quadrant, voluntary guarding absent, no masses palpated, normal bowel sounds,  scars noted right lower quadrant, and hernia present--umbilical  Extremities: no edema   SKIN:  normal skin color, texture, turgor, bruising noted right arm    Assessment:  Acute calculous cholecystitis  Thrombocytopenia  Hypertension  COPD  UTI    Plan:  1. No plans for surgical intervention at this time, first needs further medical work up, check CEA/hepatitis panel tomorrow with labs, may benefit from Hematology consult for thrombocytopenia workup  2. NPO  3. IV hydration; monitor and correct electrolytes  4. Antibiotics   5. PRN analgesics and antiemetics--minimizing narcotics as tolerated  6. Management of medical comorbid etiologies per primary team and consulting services    EDUCATION:  Educated patient on plan of care and disease process--all questions answered. Plans discussed with patient and nursing. Reviewed and discussed with Dr. Pranav Koehler consult to follow.       Signed:  SHAN Coulter - CNP  9/18/2020 2:26 PM

## 2020-09-18 NOTE — ED NOTES
Jose Silveira cnp that rochephin given prior to ordering and drawing blood cultures, one set drawn.  Attempted x 2 stick to obtain second set, Jimena Camilo CNP who states to discontinues the second set of [address] [facility] [address]  09/18/20 5539

## 2020-09-18 NOTE — ED PROVIDER NOTES
I independently performed a history and physical on Jaxon Rouse. All diagnostic, treatment, and disposition decisions were made by myself in conjunction with the advanced practice provider. Briefly, this is a 79 y.o. female here for generalized abdominal pain, most severe in her right upper quadrant. Pain worsening over the past 3 days, acutely worsened with eating. Patient states she has been unable to sleep at night due to the pain, last night she had an episode of nausea with dry heaves. Patient does report known history of gallstones, states she has not been considered a candidate for surgery due to \"low oxygen levels. \"  Has longstanding history of COPD, quit smoking 20 years ago. .    On exam, Patient afebrile and nontoxic. No distress. Heart RRR. Lungs CTAB. Abdomen soft, nondistended, exquisitely tender to palpation in the right upper quadrant with guarding. Positive Madrid. No rebound or rigidity. No lower abdominal tenderness elicited. EKG  EKG was reviewed by emergency department physician in the absence of a cardiologist    Narrow complex sinus rhythm, rate 82, normal axis, normal MA and QRS intervals, normal Qtc, no ST elevations or depressions, normal t-wave morphology, impression NSR, no STEMI      Screenings            MDM    Patient afebrile and nontoxic. No distress. Patient does have some guarding to the right upper quadrant with findings consistent with acute cholecystitis on CT imaging. Nothing to suggest obstruction, perforation or abscess. No findings to suggest acute appendicitis and clinically felt unlikely. Patient is not septic. Lab work-up is reassuring, however does have thrombocytopenia which appears new, no evidence of active bleeding, no emergent intervention is necessary at this time. No findings to suggest cardiac etiology of abdominal pain.   I discussed case with Dr. Shaniqua Redmond for general surgery, recommends further evaluation for patient's thrombocytopenia prior to any consideration of surgery. Will admit to internal medicine team.  Patient started on ceftriaxone and Flagyl. Alert, no distress and hemodynamically stable over emergency department course. Patient Referrals:  No follow-up provider specified. Discharge Medications:  New Prescriptions    No medications on file       FINAL IMPRESSION  1. Biliary calculus of other site without obstruction    2. Hypokalemia    3. Acute UTI    4. Cholecystitis    5. Kidney lesion, native, left    6. Thrombocytopenia (HCC)        Blood pressure (!) 162/50, pulse 78, temperature 97.9 °F (36.6 °C), temperature source Temporal, resp. rate 12, height 4' 9\" (1.448 m), weight 190 lb (86.2 kg), SpO2 100 %, not currently breastfeeding. For further details of [facility] emergency department encounter, please see documentation by advanced practice provider, Rasheeda Sheffield NP.     Ana María Bolivar DO (electronically signed)  Attending Emergency Physician       Ana María Bolivar DO  09/18/20 0124

## 2020-09-19 ENCOUNTER — APPOINTMENT (OUTPATIENT)
Dept: ULTRASOUND IMAGING | Age: 80
DRG: 445 | End: 2020-09-19
Payer: MEDICARE

## 2020-09-19 LAB
ALBUMIN SERPL-MCNC: 3.5 G/DL (ref 3.4–5)
ALP BLD-CCNC: 104 U/L (ref 40–129)
ALT SERPL-CCNC: 10 U/L (ref 10–40)
ANION GAP SERPL CALCULATED.3IONS-SCNC: 11 MMOL/L (ref 3–16)
APTT: 26.2 SEC (ref 24.2–36.2)
AST SERPL-CCNC: 17 U/L (ref 15–37)
BASOPHILS ABSOLUTE: 0.1 K/UL (ref 0–0.2)
BASOPHILS RELATIVE PERCENT: 0.8 %
BILIRUB SERPL-MCNC: 0.3 MG/DL (ref 0–1)
BILIRUBIN DIRECT: <0.2 MG/DL (ref 0–0.3)
BILIRUBIN, INDIRECT: ABNORMAL MG/DL (ref 0–1)
BUN BLDV-MCNC: 13 MG/DL (ref 7–20)
CALCIUM SERPL-MCNC: 8.9 MG/DL (ref 8.3–10.6)
CEA: 2.1 NG/ML (ref 0–5)
CHLORIDE BLD-SCNC: 98 MMOL/L (ref 99–110)
CO2: 28 MMOL/L (ref 21–32)
CREAT SERPL-MCNC: 0.8 MG/DL (ref 0.6–1.2)
EOSINOPHILS ABSOLUTE: 0 K/UL (ref 0–0.6)
EOSINOPHILS RELATIVE PERCENT: 0.3 %
GFR AFRICAN AMERICAN: >60
GFR NON-AFRICAN AMERICAN: >60
GLUCOSE BLD-MCNC: 149 MG/DL (ref 70–99)
HAV IGM SER IA-ACNC: NORMAL
HCT VFR BLD CALC: 36 % (ref 36–48)
HEMOGLOBIN: 12 G/DL (ref 12–16)
HEPATITIS B CORE IGM ANTIBODY: NORMAL
HEPATITIS B SURFACE ANTIGEN INTERPRETATION: NORMAL
HEPATITIS C ANTIBODY INTERPRETATION: NORMAL
INR BLD: 1.14 (ref 0.86–1.14)
LACTIC ACID: 1 MMOL/L (ref 0.4–2)
LIPASE: 40 U/L (ref 13–60)
LYMPHOCYTES ABSOLUTE: 1 K/UL (ref 1–5.1)
LYMPHOCYTES RELATIVE PERCENT: 12.9 %
MAGNESIUM: 1.9 MG/DL (ref 1.8–2.4)
MCH RBC QN AUTO: 28.2 PG (ref 26–34)
MCHC RBC AUTO-ENTMCNC: 33.3 G/DL (ref 31–36)
MCV RBC AUTO: 84.7 FL (ref 80–100)
MONOCYTES ABSOLUTE: 0.7 K/UL (ref 0–1.3)
MONOCYTES RELATIVE PERCENT: 9.3 %
NEUTROPHILS ABSOLUTE: 5.9 K/UL (ref 1.7–7.7)
NEUTROPHILS RELATIVE PERCENT: 76.7 %
PDW BLD-RTO: 14.8 % (ref 12.4–15.4)
PHOSPHORUS: 2.5 MG/DL (ref 2.5–4.9)
PLATELET # BLD: 49 K/UL (ref 135–450)
PMV BLD AUTO: 12 FL (ref 5–10.5)
POTASSIUM SERPL-SCNC: 3.7 MMOL/L (ref 3.5–5.1)
PREALBUMIN: 17.7 MG/DL (ref 20–40)
PROTHROMBIN TIME: 13.3 SEC (ref 10–13.2)
RBC # BLD: 4.25 M/UL (ref 4–5.2)
REASON FOR REJECTION: NORMAL
REJECTED TEST: NORMAL
SODIUM BLD-SCNC: 137 MMOL/L (ref 136–145)
TOTAL PROTEIN: 6.3 G/DL (ref 6.4–8.2)
TRANSFERRIN: 223 MG/DL (ref 200–360)
WBC # BLD: 7.7 K/UL (ref 4–11)

## 2020-09-19 PROCEDURE — 85610 PROTHROMBIN TIME: CPT

## 2020-09-19 PROCEDURE — 1200000000 HC SEMI PRIVATE

## 2020-09-19 PROCEDURE — 82378 CARCINOEMBRYONIC ANTIGEN: CPT

## 2020-09-19 PROCEDURE — 6370000000 HC RX 637 (ALT 250 FOR IP): Performed by: SURGERY

## 2020-09-19 PROCEDURE — 2580000003 HC RX 258: Performed by: FAMILY MEDICINE

## 2020-09-19 PROCEDURE — 36415 COLL VENOUS BLD VENIPUNCTURE: CPT

## 2020-09-19 PROCEDURE — 80074 ACUTE HEPATITIS PANEL: CPT

## 2020-09-19 PROCEDURE — 84134 ASSAY OF PREALBUMIN: CPT

## 2020-09-19 PROCEDURE — 99232 SBSQ HOSP IP/OBS MODERATE 35: CPT | Performed by: SURGERY

## 2020-09-19 PROCEDURE — 6370000000 HC RX 637 (ALT 250 FOR IP): Performed by: FAMILY MEDICINE

## 2020-09-19 PROCEDURE — 84100 ASSAY OF PHOSPHORUS: CPT

## 2020-09-19 PROCEDURE — 94640 AIRWAY INHALATION TREATMENT: CPT

## 2020-09-19 PROCEDURE — 2500000003 HC RX 250 WO HCPCS: Performed by: SURGERY

## 2020-09-19 PROCEDURE — 80076 HEPATIC FUNCTION PANEL: CPT

## 2020-09-19 PROCEDURE — 80048 BASIC METABOLIC PNL TOTAL CA: CPT

## 2020-09-19 PROCEDURE — 83605 ASSAY OF LACTIC ACID: CPT

## 2020-09-19 PROCEDURE — 6360000002 HC RX W HCPCS: Performed by: SURGERY

## 2020-09-19 PROCEDURE — 2700000000 HC OXYGEN THERAPY PER DAY

## 2020-09-19 PROCEDURE — 84466 ASSAY OF TRANSFERRIN: CPT

## 2020-09-19 PROCEDURE — 85025 COMPLETE CBC W/AUTO DIFF WBC: CPT

## 2020-09-19 PROCEDURE — 83735 ASSAY OF MAGNESIUM: CPT

## 2020-09-19 PROCEDURE — 83690 ASSAY OF LIPASE: CPT

## 2020-09-19 PROCEDURE — 76770 US EXAM ABDO BACK WALL COMP: CPT

## 2020-09-19 PROCEDURE — 85730 THROMBOPLASTIN TIME PARTIAL: CPT

## 2020-09-19 PROCEDURE — 6370000000 HC RX 637 (ALT 250 FOR IP): Performed by: NURSE PRACTITIONER

## 2020-09-19 RX ORDER — POLYETHYLENE GLYCOL 3350 17 G/17G
17 POWDER, FOR SOLUTION ORAL DAILY
Status: DISCONTINUED | OUTPATIENT
Start: 2020-09-19 | End: 2020-09-23 | Stop reason: HOSPADM

## 2020-09-19 RX ORDER — BISACODYL 10 MG
10 SUPPOSITORY, RECTAL RECTAL DAILY PRN
Status: DISCONTINUED | OUTPATIENT
Start: 2020-09-19 | End: 2020-09-23 | Stop reason: HOSPADM

## 2020-09-19 RX ORDER — DOCUSATE SODIUM 100 MG/1
100 CAPSULE, LIQUID FILLED ORAL 2 TIMES DAILY
Status: DISCONTINUED | OUTPATIENT
Start: 2020-09-19 | End: 2020-09-23 | Stop reason: HOSPADM

## 2020-09-19 RX ADMIN — METOPROLOL TARTRATE 25 MG: 25 TABLET, FILM COATED ORAL at 11:10

## 2020-09-19 RX ADMIN — METRONIDAZOLE 500 MG: 500 INJECTION, SOLUTION INTRAVENOUS at 11:12

## 2020-09-19 RX ADMIN — AMLODIPINE BESYLATE 10 MG: 5 TABLET ORAL at 11:12

## 2020-09-19 RX ADMIN — METRONIDAZOLE 500 MG: 500 INJECTION, SOLUTION INTRAVENOUS at 23:11

## 2020-09-19 RX ADMIN — ACETAMINOPHEN 650 MG: 325 TABLET ORAL at 20:25

## 2020-09-19 RX ADMIN — POLYETHYLENE GLYCOL 3350 17 G: 17 POWDER, FOR SOLUTION ORAL at 13:50

## 2020-09-19 RX ADMIN — SODIUM CHLORIDE: 9 INJECTION, SOLUTION INTRAVENOUS at 20:15

## 2020-09-19 RX ADMIN — POTASSIUM CHLORIDE 40 MEQ: 1500 TABLET, EXTENDED RELEASE ORAL at 17:04

## 2020-09-19 RX ADMIN — DOCUSATE SODIUM 100 MG: 100 CAPSULE, LIQUID FILLED ORAL at 13:49

## 2020-09-19 RX ADMIN — Medication 10 ML: at 11:13

## 2020-09-19 RX ADMIN — CIPROFLOXACIN 400 MG: 2 INJECTION, SOLUTION INTRAVENOUS at 04:49

## 2020-09-19 RX ADMIN — CIPROFLOXACIN 400 MG: 2 INJECTION, SOLUTION INTRAVENOUS at 17:04

## 2020-09-19 RX ADMIN — METRONIDAZOLE 500 MG: 500 INJECTION, SOLUTION INTRAVENOUS at 15:45

## 2020-09-19 RX ADMIN — POTASSIUM CHLORIDE 40 MEQ: 1500 TABLET, EXTENDED RELEASE ORAL at 11:10

## 2020-09-19 RX ADMIN — METOPROLOL TARTRATE 25 MG: 25 TABLET, FILM COATED ORAL at 20:13

## 2020-09-19 RX ADMIN — DOCUSATE SODIUM 100 MG: 100 CAPSULE, LIQUID FILLED ORAL at 20:13

## 2020-09-19 RX ADMIN — Medication 2 PUFF: at 20:55

## 2020-09-19 ASSESSMENT — PAIN SCALES - GENERAL
PAINLEVEL_OUTOF10: 0
PAINLEVEL_OUTOF10: 3
PAINLEVEL_OUTOF10: 0

## 2020-09-19 NOTE — ED NOTES
Called report to [facility]IL[facility] Bang 4T. Pt. Is alert and oriented. Pt. On 2L nasal cannula at home and here. Fluids infusing. Pt. On Tele. Per 4T, room will not be ready until 2100.        Roz Kong RN  09/18/20 2039 Nicolas Guillory

## 2020-09-19 NOTE — PROGRESS NOTES
CL 94* 98*   CO2 35* 28   BUN 19 13   CREATININE 0.9 0.8   GLUCOSE 127* 149*     Hepatic:   Recent Labs     09/18/20  1039 09/19/20  0611   AST 16 17   ALT 12 10   BILITOT 0.4 0.3   ALKPHOS 114 104     Amylase: No results for input(s): AMYLASE in the last 72 hours. Lipase:   Recent Labs     09/18/20  1039 09/19/20  0611   LIPASE 30.0 40.0     Mag:      Recent Labs     09/19/20  0611   MG 1.90      Phos:     Recent Labs     09/19/20  0611   PHOS 2.5      Coags:   Recent Labs     09/19/20  1008   INR 1.14   APTT 26.2       Cultures:  Relevant cultures documented under results section     Pathology:   No relevant pathology     Imaging:  I have personally reviewed the following films:    Us Renal Complete    Result Date: 9/19/2020  EXAMINATION: RETROPERITONEAL ULTRASOUND OF THE KIDNEYS AND URINARY BLADDER 9/19/2020 COMPARISON: 09/18/2020 CT HISTORY: ORDERING SYSTEM PROVIDED HISTORY: abnormal ct TECHNOLOGIST PROVIDED HISTORY: Reason for exam:->abnormal ct Complex cystic lesion at the upper pole of the left kidney. FINDINGS: Kidneys: The right kidney measures 8.7 x 5.0 x 5.1 cm in length and the left kidney measures 8.3 x 4.6 x 6.1 cm in length. The kidneys demonstrate cortical thinning but normal cortical echogenicity. There is no evidence of nephrolithiasis or hydronephrosis. No cystic or solid mass can be appreciated with ultrasound. Bladder: Unremarkable appearance of the bladder. No significant post void residual.     1. Small kidneys with cortical thinning suggests chronic medical renal disease. 2. No ultrasound evidence of cyst to correlate to the prior CT. Typical follow-up of a complex cystic lesion of the kidney would be for the patient to undergo an MRI of the abdomen with and without contrast using a dedicated renal protocol.      Ct Abdomen Pelvis W Iv Contrast Additional Contrast? None    Result Date: 9/18/2020  EXAMINATION: CT OF THE ABDOMEN AND PELVIS WITH CONTRAST 9/18/2020 11:13 am TECHNIQUE: CT of the abdomen and pelvis was performed with the administration of intravenous contrast. Multiplanar reformatted images are provided for review. Dose modulation, iterative reconstruction, and/or weight based adjustment of the mA/kV was utilized to reduce the radiation dose to as low as reasonably achievable. COMPARISON: None. HISTORY: ORDERING SYSTEM PROVIDED HISTORY: abd pain, distention, r/o SBO TECHNOLOGIST PROVIDED HISTORY: Reason for exam:->abd pain, distention, r/o SBO Additional Contrast?->None Reason for Exam: abd pain, distention, r/o SBO Acuity: Acute Type of Exam: Initial Former smoker with 30 pack year history. FINDINGS: Lower Chest: Inferior lung bases are clear. Heart size is normal. Organs: Liver is normal in appearance without worrisome focal lesion. Prominent gallstone in the neck of the gallbladder. Gallbladder wall is thickened and mild pericholecystic inflammatory stranding present. 13 mm low-density lesion posteriorly in the upper left kidney not clearly a simple cyst.  Very small cortical simple cyst posteriorly in the upper right kidney. Other abdominal organs appear normal. GI/Bowel: Prior right hemicolectomy with entero colonic anastomosis anterior to the mid liver. No bowel obstruction. Severe sigmoid diverticulosis without diverticulitis. Pelvis: Uterus remains and appears normal for age. Urinary bladder appears normal.  No free fluid or adenopathy. Peritoneum/Retroperitoneum: No mass, adenopathy, or ascites. Normal size of the aorta. No free air. Bones/Soft Tissues: No acute abnormality. Cholelithiasis and findings of mild acute cholecystitis. Indeterminate 13 mm cystic appearing lesion in the posterior upper left kidney. Lesion not seen on CT chest 05/23/2017. No other significant abnormality. RECOMMENDATIONS: Non urgent renal ultrasound suggested for further evaluation the left renal lesion.      Us Gallbladder Ruq    Result Date: 9/18/2020  EXAMINATION: RIGHT UPPER QUADRANT ULTRASOUND 9/18/2020 9:35 pm COMPARISON: CT abdomen pelvis 09/18/2020 HISTORY: ORDERING SYSTEM PROVIDED HISTORY: cholecystitis TECHNOLOGIST PROVIDED HISTORY: Reason for exam:->cholecystitis FINDINGS: LIVER:  The liver demonstrates normal echogenicity without evidence of intrahepatic biliary ductal dilatation. BILIARY SYSTEM:  Cholelithiasis with gallbladder wall thickening and positive sonographic Madrid's sign. Common bile duct is within normal limits measuring 4 mm. RIGHT KIDNEY: The right kidney is grossly unremarkable without evidence of hydronephrosis. PANCREAS:  Visualized portions of the pancreas are unremarkable. OTHER: No evidence of right upper quadrant ascites. Acute cholecystitis. No choledocholithiasis or duct dilatation.        Scheduled Meds:   Efferdent Denture Cleanser        docusate sodium  100 mg Oral BID    polyethylene glycol  17 g Oral Daily    potassium chloride  40 mEq Oral BID WC    amLODIPine  10 mg Oral Daily    metoprolol tartrate  25 mg Oral BID    tiotropium  2 puff Inhalation Daily    sodium chloride flush  10 mL Intravenous 2 times per day    ciprofloxacin  400 mg Intravenous Q12H    metroNIDAZOLE  500 mg Intravenous Q8H    budesonide-formoterol  2 puff Inhalation BID     Continuous Infusions:   sodium chloride 100 mL/hr at 09/18/20 1847     PRN Meds:.bisacodyl, morphine, sodium chloride flush, acetaminophen **OR** acetaminophen, polyethylene glycol, promethazine **OR** ondansetron, hydrALAZINE      Assessment:  Patient Active Problem List   Diagnosis    Acute on chronic respiratory failure (HCC)    Chronic obstructive pulmonary disease with acute exacerbation ([facility] [address]    HTN (hypertension)    Enlarged thyroid & cyst per CT chest 6/2015    Centrilobular emphysema (HCC)    Chronic respiratory failure with hypoxia (HCC)    Solitary pulmonary nodule    Cholelithiasis    Cholecystitis     Cholecystitis  UTI  Thrombocytopenia  COPD on home oxygen  History of colon

## 2020-09-19 NOTE — PROGRESS NOTES
Patient up to 4T. VSS. BP elevated. PRN hydralazine given. Remains NPO, IV infusing. Oriented to room and use of call light system. Patient ambulates without use of ambulatory aid, steady gait, and is refusing bed alarm. The care plan and education has been reviewed and mutually agreed upon with the patient. All needs met at this time. Will continue to monitor.

## 2020-09-19 NOTE — H&P
HOSPITALISTS HISTORY AND PHYSICAL    9/18/2020 9:46 PM    Patient Information:  Brianne Woo is a 79 y.o. female [id number]  PCP:  Garret Ansari (Tel: None )    Chief complaint:    Chief Complaint   Patient presents with    Abdominal Pain     Patient with c/o abdominal pain x3 days. Describes it as shooting pains in her RUQ. Also states she hasn't moved her bowels in 3 days, usually goes everyday    *    History of Present Illness:  Jimmie Perez is a 79 y.o. female with h/o COPD presents with /o RUQ abdominal pain on going for 3 days worst with eating. Associated with nausea. cT abdomen showed concern for cholecystitis. She also has UTI    REVIEW OF SYSTEMS:   Constitutional: Negative for fever,chills or night sweats  ENT: Negative for rhinorrhea, epistaxis, hoarseness, sore throat. Respiratory: Negative for shortness of breath,wheezing  Cardiovascular: Negative for chest pain, palpitations   Gastrointestinal: Negative for nausea, vomiting, diarrhea  Genitourinary: Negative for polyuria, dysuria   Hematologic/Lymphatic: Negative for bleeding tendency, easy bruising  Musculoskeletal: Negative for myalgias and arthralgias  Neurologic: Negative for confusion,dysarthria. Skin: Negative for itching,rash  Psychiatric: Negative for depression,anxiety, agitation. Endocrine: Negative for polydipsia,polyuria,heat /cold intolerance. Past Medical History:   has a past medical history of Cataracts, bilateral, Cervical cancer screening, COPD (chronic obstructive pulmonary disease) ([facility] [address] Gall stones, H/O colonoscopy, H/O mammogram, and Hypertension. Past Surgical History:   has a past surgical history that includes Colon surgery (1990). Medications:  No current facility-administered medications on file prior to encounter.       Current Outpatient Medications on File Prior to Encounter   Medication Sig Dispense Refill    fluticasone-salmeterol (ADVAIR DISKUS) 250-50 MCG/DOSE AEPB Inhale 1 puff into the lungs every 12 hours      hydrochlorothiazide (HYDRODIURIL) 25 MG tablet Take 25 mg by mouth daily      albuterol sulfate  (90 BASE) MCG/ACT inhaler Inhale 2 puffs into the lungs every 4 hours as needed for Wheezing or Shortness of Breath 1 Inhaler 11    amLODIPine (NORVASC) 10 MG tablet TAKE 1 TABLET BY MOUTH DAILY 30 tablet 0    SPIRIVA HANDIHALER 18 MCG inhalation capsule INHALE 1 CAPSULE INTO THE LUNGS DAILY USING THE HANDIHALER 30 capsule 0    metoprolol tartrate (LOPRESSOR) 25 MG tablet Take 1 tablet by mouth 2 times daily 60 tablet 1    ketoconazole (NIZORAL) 2 % cream Apply topically daily Apply topically daily.        Current Facility-Administered Medications   Medication Dose Route Frequency Provider Last Rate Last Dose    potassium chloride (KLOR-CON M) extended release tablet 40 mEq  40 mEq Oral BID  Alejandro Thakkar, APRN - CNP   40 mEq at 09/18/20 1116    amLODIPine (NORVASC) tablet 10 mg  10 mg Oral Daily Elo Gilliland MD   10 mg at 09/18/20 1841    fluticasone-salmeterol (ADVAIR) 250-50 MCG/DOSE AEPB 1 puff  1 puff Inhalation Q12H Elo Gilliland MD        metoprolol tartrate (LOPRESSOR) tablet 25 mg  25 mg Oral BID Elo Gilliland MD        tiotropium [facility]) inhalation capsule 18 mcg  18 mcg Inhalation Daily Elo Gilliland MD        0.9 % sodium chloride infusion   Intravenous Continuous Elo Gilliland  mL/hr at 09/18/20 1847      morphine injection 4 mg  4 mg Intravenous Q4H PRN Elo Gilliland MD        sodium chloride flush 0.9 % injection 10 mL  10 mL Intravenous 2 times per day Elo Gilliland MD        sodium chloride flush 0.9 % injection 10 mL  10 mL Intravenous PRN Elo Gilliland MD        acetaminophen (TYLENOL) tablet 650 mg  650 mg Oral Q6H PRN Elo Gilliland MD        Or   [facility] acetaminophen (TYLENOL) suppository 650 mg  650 mg Rectal Q6H PRN Elo Gilliland MD        polyethylene glycol [facility]) packet 17 g  17 g Oral Daily PRN Edward Suggs MD        promethazine (PHENERGAN) tablet 12.5 mg  12.5 mg Oral Q6H PRN Edward Suggs MD        Or    ondansetron (ZOFRAN) injection 4 mg  4 mg Intravenous Q6H PRN Edward Suggs MD        enoxaparin (LOVENOX) injection 40 mg  40 mg Subcutaneous Daily Edward Suggs MD        ciprofloxacin (CIPRO) IVPB 400 mg  400 mg Intravenous Q12H Percy Curiel MD   Stopped at 09/18/20 2014    metronidazole (FLAGYL) 500 mg in NaCl 100 mL IVPB premix  500 mg Intravenous Q8H Percy Curiel MD         Current Outpatient Medications   Medication Sig Dispense Refill    fluticasone-salmeterol (ADVAIR DISKUS) 250-50 MCG/DOSE AEPB Inhale 1 puff into the lungs every 12 hours      hydrochlorothiazide (HYDRODIURIL) 25 MG tablet Take 25 mg by mouth daily      albuterol sulfate  (90 BASE) MCG/ACT inhaler Inhale 2 puffs into the lungs every 4 hours as needed for Wheezing or Shortness of Breath 1 Inhaler 11    amLODIPine (NORVASC) 10 MG tablet TAKE 1 TABLET BY MOUTH DAILY 30 tablet 0    SPIRIVA HANDIHALER 18 MCG inhalation capsule INHALE 1 CAPSULE INTO THE LUNGS DAILY USING THE HANDIHALER 30 capsule 0    metoprolol tartrate (LOPRESSOR) 25 MG tablet Take 1 tablet by mouth 2 times daily 60 tablet 1    ketoconazole (NIZORAL) 2 % cream Apply topically daily Apply topically daily. Allergies: Allergies   Allergen Reactions    Azithromycin     Codeine         Social History:   reports that she quit smoking about 24 years ago. Her smoking use included cigarettes. She has a 30.00 pack-year smoking history. She has never used smokeless tobacco. She reports that she does not drink alcohol or use drugs. Family History:  family history includes Arrhythmia in her brother; Cancer in her brother, brother, sister, and sister;  Heart Disease in her father; No Known Problems in her maternal grandfather, maternal grandmother, mother, paternal grandfather, paternal grandmother, and another family member. ,     Physical Exam:  BP (!) 165/83   Pulse 87   Temp 98 °F (36.7 °C) (Oral)   Resp 18   Ht 4' 9\" (1.448 m)   Wt 190 lb (86.2 kg)   SpO2 99%   BMI 41.12 kg/m²     General appearance:  Appears comfortable. Well nourished  Eyes: Sclera clear, pupils equal  ENT: Moist mucus membranes, no thrush. Trachea midline. Cardiovascular: Regular rhythm, normal S1, S2. No murmur, gallop, rub. No edema in lower extremities  Respiratory: Clear to auscultation bilaterally, no wheeze, good inspiratory effort  Gastrointestinal: Abdomen soft, non-tender, not distended, normal bowel sounds  Musculoskeletal: No cyanosis in digits, neck supple  Neurology: Cranial nerves grossly intact. Alert and oriented in time, place and person. No speech or motor deficits  Psychiatry: Appropriate affect. Not agitated  Skin: Warm, dry, normal turgor, no rash    Labs:  CBC:   Lab Results   Component Value Date    WBC 8.1 09/18/2020    RBC 4.45 09/18/2020    HGB 12.6 09/18/2020    HCT 37.3 09/18/2020    MCV 83.8 09/18/2020    MCH 28.3 09/18/2020    MCHC 33.7 09/18/2020    RDW 14.5 09/18/2020    PLT 46 09/18/2020    MPV 13.2 09/18/2020     BMP:    Lab Results   Component Value Date     09/18/2020    K 3.1 09/18/2020    CL 94 09/18/2020    CO2 35 09/18/2020    BUN 19 09/18/2020    CREATININE 0.9 09/18/2020    CALCIUM 9.5 09/18/2020    GFRAA >60 09/18/2020    GFRAA >60 05/29/2013    LABGLOM >60 09/18/2020    GLUCOSE 127 09/18/2020       Chest Xray:   EKG:        Problem List  Active Problems:    Cholecystitis  Resolved Problems:    * No resolved hospital problems. *        Assessment/Plan:         1. Cholecystitis  Normal liver enzymes and bili  RUQ us ordered  Pain control NPO  Surgery following    UTI  Cultures pending  IV abx      Admit as inpatient. I anticipate hospitalization spanning more than two midnights for investigation and treatment of the above medically necessary diagnoses.       Massimo Mancilla MD    9/18/2020 9:46 PM

## 2020-09-19 NOTE — PROGRESS NOTES
4 Eyes Skin Assessment     The patient is being assess for  Admission    I agree that 2 RN's have performed a thorough Head to Toe Skin Assessment on the patient. ALL assessment sites listed below have been assessed. Areas assessed by both nurses:  [x]   Head, Face, and Ears   [x]   Shoulders, Back, and Chest  [x]   Arms, Elbows, and Hands   [x]   Coccyx, Sacrum, and IschIum  [x]   Legs, Feet, and Heels        Does the Patient have Skin Breakdown?   No         Vargas Prevention initiated:  No   Wound Care Orders initiated:  No      [facility] nurse consulted for Pressure Injury (Stage 3,4, Unstageable, DTI, NWPT, and Complex wounds), New and Established Ostomies:  No      Nurse 1 eSignature: Electronically signed by Keturah Escobar RN on 9/19/2020 at 1:38 AM    Nurse 2 eSignature: {Esignature:[id number]}

## 2020-09-20 LAB
A/G RATIO: 1.4 (ref 1.1–2.2)
ALBUMIN SERPL-MCNC: 3.4 G/DL (ref 3.4–5)
ALP BLD-CCNC: 96 U/L (ref 40–129)
ALT SERPL-CCNC: 8 U/L (ref 10–40)
ANION GAP SERPL CALCULATED.3IONS-SCNC: 5 MMOL/L (ref 3–16)
ANISOCYTOSIS: ABNORMAL
AST SERPL-CCNC: 14 U/L (ref 15–37)
BASOPHILS ABSOLUTE: 0 K/UL (ref 0–0.2)
BASOPHILS RELATIVE PERCENT: 0.6 %
BILIRUB SERPL-MCNC: 0.3 MG/DL (ref 0–1)
BUN BLDV-MCNC: 13 MG/DL (ref 7–20)
CALCIUM SERPL-MCNC: 8.5 MG/DL (ref 8.3–10.6)
CHLORIDE BLD-SCNC: 99 MMOL/L (ref 99–110)
CO2: 32 MMOL/L (ref 21–32)
CREAT SERPL-MCNC: 0.9 MG/DL (ref 0.6–1.2)
EOSINOPHILS ABSOLUTE: 0 K/UL (ref 0–0.6)
EOSINOPHILS RELATIVE PERCENT: 0.6 %
FOLATE: 16.38 NG/ML (ref 4.78–24.2)
GFR AFRICAN AMERICAN: >60
GFR NON-AFRICAN AMERICAN: >60
GLOBULIN: 2.4 G/DL
GLUCOSE BLD-MCNC: 121 MG/DL (ref 70–99)
HCT VFR BLD CALC: 32 % (ref 36–48)
HEMOGLOBIN: 10.4 G/DL (ref 12–16)
LYMPHOCYTES ABSOLUTE: 1 K/UL (ref 1–5.1)
LYMPHOCYTES RELATIVE PERCENT: 15.9 %
MAGNESIUM: 1.7 MG/DL (ref 1.8–2.4)
MCH RBC QN AUTO: 28.1 PG (ref 26–34)
MCHC RBC AUTO-ENTMCNC: 32.7 G/DL (ref 31–36)
MCV RBC AUTO: 86.2 FL (ref 80–100)
MONOCYTES ABSOLUTE: 0.6 K/UL (ref 0–1.3)
MONOCYTES RELATIVE PERCENT: 10.2 %
NEUTROPHILS ABSOLUTE: 4.6 K/UL (ref 1.7–7.7)
NEUTROPHILS RELATIVE PERCENT: 72.7 %
ORGANISM: ABNORMAL
PDW BLD-RTO: 14.6 % (ref 12.4–15.4)
PHOSPHORUS: 2.8 MG/DL (ref 2.5–4.9)
PLATELET # BLD: 43 K/UL (ref 135–450)
PLATELET SLIDE REVIEW: ABNORMAL
PMV BLD AUTO: 12 FL (ref 5–10.5)
POTASSIUM SERPL-SCNC: 4.4 MMOL/L (ref 3.5–5.1)
RBC # BLD: 3.71 M/UL (ref 4–5.2)
SODIUM BLD-SCNC: 136 MMOL/L (ref 136–145)
TOTAL PROTEIN: 5.8 G/DL (ref 6.4–8.2)
URINE CULTURE, ROUTINE: ABNORMAL
VITAMIN B-12: 232 PG/ML (ref 211–911)
WBC # BLD: 6.4 K/UL (ref 4–11)

## 2020-09-20 PROCEDURE — 83735 ASSAY OF MAGNESIUM: CPT

## 2020-09-20 PROCEDURE — 6370000000 HC RX 637 (ALT 250 FOR IP): Performed by: FAMILY MEDICINE

## 2020-09-20 PROCEDURE — 6370000000 HC RX 637 (ALT 250 FOR IP): Performed by: SURGERY

## 2020-09-20 PROCEDURE — 36415 COLL VENOUS BLD VENIPUNCTURE: CPT

## 2020-09-20 PROCEDURE — 86038 ANTINUCLEAR ANTIBODIES: CPT

## 2020-09-20 PROCEDURE — 2580000003 HC RX 258: Performed by: FAMILY MEDICINE

## 2020-09-20 PROCEDURE — 6360000002 HC RX W HCPCS: Performed by: SURGERY

## 2020-09-20 PROCEDURE — 82607 VITAMIN B-12: CPT

## 2020-09-20 PROCEDURE — 1200000000 HC SEMI PRIVATE

## 2020-09-20 PROCEDURE — 2700000000 HC OXYGEN THERAPY PER DAY

## 2020-09-20 PROCEDURE — 85025 COMPLETE CBC W/AUTO DIFF WBC: CPT

## 2020-09-20 PROCEDURE — 84100 ASSAY OF PHOSPHORUS: CPT

## 2020-09-20 PROCEDURE — 82746 ASSAY OF FOLIC ACID SERUM: CPT

## 2020-09-20 PROCEDURE — 86022 PLATELET ANTIBODIES: CPT

## 2020-09-20 PROCEDURE — 94761 N-INVAS EAR/PLS OXIMETRY MLT: CPT

## 2020-09-20 PROCEDURE — 2500000003 HC RX 250 WO HCPCS: Performed by: SURGERY

## 2020-09-20 PROCEDURE — 99232 SBSQ HOSP IP/OBS MODERATE 35: CPT | Performed by: SURGERY

## 2020-09-20 PROCEDURE — 80053 COMPREHEN METABOLIC PANEL: CPT

## 2020-09-20 PROCEDURE — 6370000000 HC RX 637 (ALT 250 FOR IP): Performed by: NURSE PRACTITIONER

## 2020-09-20 PROCEDURE — 94640 AIRWAY INHALATION TREATMENT: CPT

## 2020-09-20 PROCEDURE — 6360000002 HC RX W HCPCS: Performed by: INTERNAL MEDICINE

## 2020-09-20 RX ORDER — IPRATROPIUM BROMIDE AND ALBUTEROL SULFATE 2.5; .5 MG/3ML; MG/3ML
1 SOLUTION RESPIRATORY (INHALATION) EVERY 4 HOURS PRN
Status: DISCONTINUED | OUTPATIENT
Start: 2020-09-20 | End: 2020-09-23 | Stop reason: HOSPADM

## 2020-09-20 RX ADMIN — METRONIDAZOLE 500 MG: 500 INJECTION, SOLUTION INTRAVENOUS at 23:34

## 2020-09-20 RX ADMIN — DOCUSATE SODIUM 100 MG: 100 CAPSULE, LIQUID FILLED ORAL at 08:39

## 2020-09-20 RX ADMIN — METOPROLOL TARTRATE 25 MG: 25 TABLET, FILM COATED ORAL at 20:15

## 2020-09-20 RX ADMIN — AMLODIPINE BESYLATE 10 MG: 5 TABLET ORAL at 08:38

## 2020-09-20 RX ADMIN — Medication 2 PUFF: at 18:26

## 2020-09-20 RX ADMIN — POTASSIUM CHLORIDE 40 MEQ: 1500 TABLET, EXTENDED RELEASE ORAL at 16:55

## 2020-09-20 RX ADMIN — METRONIDAZOLE 500 MG: 500 INJECTION, SOLUTION INTRAVENOUS at 14:56

## 2020-09-20 RX ADMIN — POTASSIUM CHLORIDE 40 MEQ: 1500 TABLET, EXTENDED RELEASE ORAL at 08:38

## 2020-09-20 RX ADMIN — SODIUM CHLORIDE: 9 INJECTION, SOLUTION INTRAVENOUS at 06:14

## 2020-09-20 RX ADMIN — CIPROFLOXACIN 400 MG: 2 INJECTION, SOLUTION INTRAVENOUS at 06:11

## 2020-09-20 RX ADMIN — METOPROLOL TARTRATE 25 MG: 25 TABLET, FILM COATED ORAL at 08:39

## 2020-09-20 RX ADMIN — POLYETHYLENE GLYCOL 3350 17 G: 17 POWDER, FOR SOLUTION ORAL at 08:39

## 2020-09-20 RX ADMIN — Medication 1 G: at 14:54

## 2020-09-20 RX ADMIN — TIOTROPIUM BROMIDE INHALATION SPRAY 2 PUFF: 3.12 SPRAY, METERED RESPIRATORY (INHALATION) at 08:01

## 2020-09-20 RX ADMIN — DOCUSATE SODIUM 100 MG: 100 CAPSULE, LIQUID FILLED ORAL at 20:15

## 2020-09-20 RX ADMIN — Medication 10 ML: at 20:15

## 2020-09-20 RX ADMIN — Medication 2 PUFF: at 08:00

## 2020-09-20 RX ADMIN — METRONIDAZOLE 500 MG: 500 INJECTION, SOLUTION INTRAVENOUS at 07:28

## 2020-09-20 ASSESSMENT — PAIN SCALES - GENERAL
PAINLEVEL_OUTOF10: 0
PAINLEVEL_OUTOF10: 0

## 2020-09-20 NOTE — PROGRESS NOTES
[facility] PROGRESS NOTE    09/19/20        Name: Chris Tracyr . Admitted: 9/18/2020  Primary Care Provider: Garret Ansari (Tel: None)      Subjective:  . Admitted for cholecystitis and uti  Pain is controlled   Remains afebrile  Diet advanced     Reviewed interval ancillary notes    Current Medications  docusate sodium (COLACE) capsule 100 mg, BID  bisacodyl (DULCOLAX) suppository 10 mg, Daily PRN  polyethylene glycol (GLYCOLAX) packet 17 g, Daily  potassium chloride (KLOR-CON M) extended release tablet 40 mEq, BID WC  amLODIPine (NORVASC) tablet 10 mg, Daily  metoprolol tartrate (LOPRESSOR) tablet 25 mg, BID  tiotropium (SPIRIVA RESPIMAT) 2.5 MCG/ACT inhaler 2 puff, Daily  0.9 % sodium chloride infusion, Continuous  morphine injection 4 mg, Q4H PRN  sodium chloride flush 0.9 % injection 10 mL, 2 times per day  sodium chloride flush 0.9 % injection 10 mL, PRN  acetaminophen (TYLENOL) tablet 650 mg, Q6H PRN    Or  acetaminophen (TYLENOL) suppository 650 mg, Q6H PRN  promethazine (PHENERGAN) tablet 12.5 mg, Q6H PRN    Or  ondansetron (ZOFRAN) injection 4 mg, Q6H PRN  ciprofloxacin (CIPRO) IVPB 400 mg, Q12H  metronidazole (FLAGYL) 500 mg in NaCl 100 mL IVPB premix, Q8H  hydrALAZINE (APRESOLINE) injection 5 mg, Q4H PRN  budesonide-formoterol (SYMBICORT) 160-4.5 MCG/ACT inhaler 2 puff, BID        Objective:  BP (!) 150/69   Pulse 66   Temp 97 °F (36.1 °C) (Oral)   Resp 20   Ht 4' 9\" (1.448 m)   Wt 190 lb (86.2 kg)   SpO2 93%   BMI 41.12 kg/m²   No intake or output data in the 24 hours ending 09/20/20 0523   Wt Readings from Last 3 Encounters:   09/18/20 190 lb (86.2 kg)   05/15/17 197 lb (89.4 kg)   10/14/16 201 lb 12.8 oz (91.5 kg)       General appearance:  Appears comfortable  Eyes: Sclera clear. Pupils equal.  ENT: Moist oral mucosa. Trachea midline, no adenopathy.   Cardiovascular: Regular rhythm, normal S1, S2. No murmur. No edema in lower extremities  Respiratory: Not using accessory muscles. Good inspiratory effort. Clear to auscultation bilaterally, no wheeze or crackles. GI: Abdomen soft, no tenderness, not distended, normal bowel sounds  Musculoskeletal: No cyanosis in digits, neck supple  Neurology: CN 2-12 grossly intact. No speech or motor deficits  Psych: Normal affect. Alert and oriented in time, place and person  Skin: Warm, dry, normal turgor    Labs and Tests:  CBC:   Recent Labs     09/18/20  1039 09/19/20  1008   WBC 8.1 7.7   HGB 12.6 12.0   PLT 46* 49*     BMP:    Recent Labs     09/18/20  1039 09/19/20  0611    137   K 3.1* 3.7   CL 94* 98*   CO2 35* 28   BUN 19 13   CREATININE 0.9 0.8   GLUCOSE 127* 149*     Hepatic:   Recent Labs     09/18/20  1039 09/19/20  0611   AST 16 17   ALT 12 10   BILITOT 0.4 0.3   ALKPHOS 114 104           Problem List  Active Problems:    Cholecystitis  Resolved Problems:    * No resolved hospital problems. *       Assessment & Plan:   1.  Cholecystitis  Normal liver enzymes and bili  RUQ US  Confirmed above  Pain control   IV fluids  Diet advanced to clear  Surgery following     UTI  Cultures pending  IV abx      Diet: DIET CLEAR LIQUID;  Code:Full Code  DVT PPX      Bon Benitez MD   09/19/20

## 2020-09-20 NOTE — CONSULTS
Daily PRN Estrellita Jean Baptiste MD        polyethylene glycol [facility]) packet 17 g  17 g Oral Daily Estrellita Jean Baptiste MD   17 g at 09/20/20 0839    potassium chloride (KLOR-CON M) extended release tablet 40 mEq  40 mEq Oral BID  SHAN Aguila - CNP   40 mEq at 09/20/20 0838    amLODIPine (NORVASC) tablet 10 mg  10 mg Oral Daily Elvia Remy MD   10 mg at 09/20/20 9460    metoprolol tartrate (LOPRESSOR) tablet 25 mg  25 mg Oral BID Elvia Remy MD   25 mg at 09/20/20 0839    tiotropium (SPIRIVA RESPIMAT) 2.5 MCG/ACT inhaler 2 puff  2 puff Inhalation Daily Elvia Remy MD   2 puff at 09/20/20 0801    0.9 % sodium chloride infusion   Intravenous Continuous Elvia Remy  mL/hr at 09/20/20 0614      morphine injection 4 mg  4 mg Intravenous Q4H PRN Elvia Remy MD        sodium chloride flush 0.9 % injection 10 mL  10 mL Intravenous 2 times per day Elvia Remy MD   10 mL at 09/19/20 1113    sodium chloride flush 0.9 % injection 10 mL  10 mL Intravenous PRN Elvia Remy MD        acetaminophen (TYLENOL) tablet 650 mg  650 mg Oral Q6H PRN Elvia Remy MD   650 mg at 09/19/20 2025    Or    acetaminophen (TYLENOL) suppository 650 mg  650 mg Rectal Q6H PRN Elvia Remy MD        promethazine (PHENERGAN) tablet 12.5 mg  12.5 mg Oral Q6H PRN Elvia Remy MD        Or    ondansetron (ZOFRAN) injection 4 mg  4 mg Intravenous Q6H PRN Elvia Remy MD        metronidazole (FLAGYL) 500 mg in NaCl 100 mL IVPB premix  500 mg Intravenous Q8H Estrellita Jean Baptiste  mL/hr at 09/20/20 1456 500 mg at 09/20/20 1456    hydrALAZINE (APRESOLINE) injection 5 mg  5 mg Intravenous Q4H PRN Elvia Remy MD   5 mg at 09/18/20 2300    budesonide-formoterol (SYMBICORT) 160-4.5 MCG/ACT inhaler 2 puff  2 puff Inhalation BID Elvia Remy MD   2 puff at 09/20/20 0800     Allergies:     Allergies   Allergen Reactions    Azithromycin     Codeine       Social History:    reports that she quit smoking about 24 years ago. Her smoking use included cigarettes. She has a 30.00 pack-year smoking history. She has never used smokeless tobacco. She reports that she does not drink alcohol or use drugs.  with 8 children currently lives in building and daughter lives in same building was a  at [address] [facility]  Family History:     family history includes Arrhythmia in her brother; Cancer in her brother, brother, sister, and sister; Heart Disease in her father; No Known Problems in her maternal grandfather, maternal grandmother, mother, paternal grandfather, paternal grandmother, and another family member. 2 sons have  of cancer     REVIEW OF SYSTEMS:      · Constitutional: Denies fever, chills, sweats, weight loss. Has had pain in right upper abdomen thinks that she is bloated      · Eyes: No visual changes or diplopia. No scleral icterus. · ENT: No Headaches, hearing loss or vertigo. No mouth sores or sore throat. · Cardiovascular: No chest pain, dyspnea on exertion, palpitations or loss of consciousness. · Respiratory: No cough or wheezing, no sputum production. No hemoptysis. Has oxygen dependent copd   · Gastrointestinal: No abdominal pain, appetite loss, blood in stools. No change in bowel habits. Had colon resection which she states was for polyps years ago   · Genitourinary: No dysuria, trouble voiding, or hematuria. · Musculoskeletal:   No generalized weakness. No joint complaints. · Integumentary: No rash or pruritis. · Neurological: No headache, diplopia. No change in gait, balance, or coordination. No focal neurological deficits including weakness, numbness, or tingling. · Endocrine: No temperature intolerance. No excessive thirst, fluid intake, or urination. · Hematologic/Lymphatic: No abnormal bruising or ecchymoses, blood clots or swollen lymph nodes. · Allergic/Immunologic: No nasal congestion or hives.   ·     PHYSICAL EXAM:    Vitals:  Vitals:    20 1254   BP: 131/74   Pulse: 74   Resp: 09/19/2020    MG 2.00 06/30/2015       Imaging:  Us Renal Complete    Result Date: 9/19/2020  EXAMINATION: RETROPERITONEAL ULTRASOUND OF THE KIDNEYS AND URINARY BLADDER 9/19/2020 COMPARISON: 09/18/2020 CT HISTORY: ORDERING SYSTEM PROVIDED HISTORY: abnormal ct TECHNOLOGIST PROVIDED HISTORY: Reason for exam:->abnormal ct Complex cystic lesion at the upper pole of the left kidney. FINDINGS: Kidneys: The right kidney measures 8.7 x 5.0 x 5.1 cm in length and the left kidney measures 8.3 x 4.6 x 6.1 cm in length. The kidneys demonstrate cortical thinning but normal cortical echogenicity. There is no evidence of nephrolithiasis or hydronephrosis. No cystic or solid mass can be appreciated with ultrasound. Bladder: Unremarkable appearance of the bladder. No significant post void residual.     1. Small kidneys with cortical thinning suggests chronic medical renal disease. 2. No ultrasound evidence of cyst to correlate to the prior CT. Typical follow-up of a complex cystic lesion of the kidney would be for the patient to undergo an MRI of the abdomen with and without contrast using a dedicated renal protocol. Ct Abdomen Pelvis W Iv Contrast Additional Contrast? None    Result Date: 9/18/2020  EXAMINATION: CT OF THE ABDOMEN AND PELVIS WITH CONTRAST 9/18/2020 11:13 am TECHNIQUE: CT of the abdomen and pelvis was performed with the administration of intravenous contrast. Multiplanar reformatted images are provided for review. Dose modulation, iterative reconstruction, and/or weight based adjustment of the mA/kV was utilized to reduce the radiation dose to as low as reasonably achievable. COMPARISON: None. HISTORY: ORDERING SYSTEM PROVIDED HISTORY: abd pain, distention, r/o SBO TECHNOLOGIST PROVIDED HISTORY: Reason for exam:->abd pain, distention, r/o SBO Additional Contrast?->None Reason for Exam: abd pain, distention, r/o SBO Acuity: Acute Type of Exam: Initial Former smoker with 30 pack year history.  FINDINGS: Lower Chest: Inferior lung bases are clear. Heart size is normal. Organs: Liver is normal in appearance without worrisome focal lesion. Prominent gallstone in the neck of the gallbladder. Gallbladder wall is thickened and mild pericholecystic inflammatory stranding present. 13 mm low-density lesion posteriorly in the upper left kidney not clearly a simple cyst.  Very small cortical simple cyst posteriorly in the upper right kidney. Other abdominal organs appear normal. GI/Bowel: Prior right hemicolectomy with entero colonic anastomosis anterior to the mid liver. No bowel obstruction. Severe sigmoid diverticulosis without diverticulitis. Pelvis: Uterus remains and appears normal for age. Urinary bladder appears normal.  No free fluid or adenopathy. Peritoneum/Retroperitoneum: No mass, adenopathy, or ascites. Normal size of the aorta. No free air. Bones/Soft Tissues: No acute abnormality. Cholelithiasis and findings of mild acute cholecystitis. Indeterminate 13 mm cystic appearing lesion in the posterior upper left kidney. Lesion not seen on CT chest 05/23/2017. No other significant abnormality. RECOMMENDATIONS: Non urgent renal ultrasound suggested for further evaluation the left renal lesion. Us Gallbladder Ruq    Result Date: 9/18/2020  EXAMINATION: RIGHT UPPER QUADRANT ULTRASOUND 9/18/2020 9:35 pm COMPARISON: CT abdomen pelvis 09/18/2020 HISTORY: ORDERING SYSTEM PROVIDED HISTORY: cholecystitis TECHNOLOGIST PROVIDED HISTORY: Reason for exam:->cholecystitis FINDINGS: LIVER:  The liver demonstrates normal echogenicity without evidence of intrahepatic biliary ductal dilatation. BILIARY SYSTEM:  Cholelithiasis with gallbladder wall thickening and positive sonographic Madrid's sign. Common bile duct is within normal limits measuring 4 mm. RIGHT KIDNEY: The right kidney is grossly unremarkable without evidence of hydronephrosis. PANCREAS:  Visualized portions of the pancreas are unremarkable.  OTHER: No evidence of right upper quadrant ascites. Acute cholecystitis. No choledocholithiasis or duct dilatation. Assessment & Plan:    Acute cholecystitis surgery on board uncertain as to whether she is operative candidate agree with initial antibiotics    Thrombocytopenia uncertain cause can not find any blood work in last several years Will check b12 folate reviewed peripheral smear and no schistocytes review of smear appears to confirm platelet count  Will observe at present no indication for platelet transfusion may be due to infection    Copd severe oxygen dependent    13 mm cystic lesion in left kidney would observe for now     Hypertension     Constipation appears improve           I have discussed the above stated plan with the patient and they verbalized understanding and agreed with the plan. Thank you for allowing us to participate in this patients care.     Charbel Cruz MD  9/20/2020, 3:21 PM

## 2020-09-20 NOTE — PROGRESS NOTES
fluids  Rocephin/Flagyl  Diet advanced to clear  Surgery following - possible cholecystostomy drain vs lap elier    Acute Thrombocytopenia - Hep serologies unremarkable; APF4 pending; Heme consulted in anticipation of probable surgical procedure; etiology currently unclear     EColi UTI  Rocephin IV abx    Chronic Hypoxic Respiratory failure d/t end-stage COPD - continue inh Tx; add duonebs PRN; I/S; has held lap elier in past d/t COPD; poor surgical candidate; consider pulm consult if procedure recommended     Daughter updated over phone     Diet: DIET CLEAR LIQUID;  Code:Full Code  DVT PPX            [facility], DO

## 2020-09-20 NOTE — PROGRESS NOTES
[facility] 82 [facility] Lapar[facility]        Progress Note    Pt Name: Alva Stevens  MRN: [id number]  YOB: 1940  Date of evaluation: 2020    Chief Complaint: abdominal pain      Subjective:    No acute events overnight  Some pain with liquids, feels better but still with pain  No nausea  Complains of dyspnea  Passing stool with laxative    Vital Signs:  Patient Vitals for the past 24 hrs:   BP Temp Temp src Pulse Resp SpO2   20 0830 122/64 97.8 °F (36.6 °C) Oral 69 18 95 %   20 0801 -- -- -- -- 18 94 %   20 0400 (!) 150/69 97 °F (36.1 °C) Oral 66 20 93 %   20 0035 (!) 135/51 97.8 °F (36.6 °C) Oral 68 20 97 %   206 -- -- -- -- 20 94 %   20 2000 (!) 179/55 97.9 °F (36.6 °C) Oral 79 20 94 %   20 1655 (!) 145/73 98.2 °F (36.8 °C) Oral 79 18 97 %   20 1305 (!) 155/51 98.8 °F (37.1 °C) Oral 91 18 97 %      TEMPERATURE HISTORY 24H: Temp (24hrs), Av.9 °F (36.6 °C), Min:97 °F (36.1 °C), Max:98.8 °F (37.1 °C)    BLOOD PRESSURE HISTORY: Systolic (64CME), HK , Min:122 , AAR:190    Diastolic (75AHI), AIM:76, Min:51, Max:73      Intake/Output:    No intake/output data recorded. No intake/output data recorded.   Drain/tube Output:           Physical Exam:  General: awake, alert, oriented to  person, place, time  Cardiac: regular rate and rhythm   Pulmonary: clear to auscultation bilaterally   Abdomen: soft, non-distended, mild right upper quadrant tenderness without peritonitis    Labs:  CBC:    Recent Labs     20  1039 20  1008 20  0711   WBC 8.1 7.7 6.4   HGB 12.6 12.0 10.4*   HCT 37.3 36.0 32.0*   PLT 46* 49* 43*     BMP:    Recent Labs     20  1039 20  0611 20  0711    137 136   K 3.1* 3.7 4.4   CL 94* 98* 99   CO2 35* 28 32   BUN 19 13 13   CREATININE 0.9 0.8 0.9   GLUCOSE 127* 149* 121*     Hepatic:   Recent Labs     20  1039 20  0611 20  0711   AST 16 17 14*   ALT 12 10 8*   BILITOT 0.4 0.3 0.3   ALKPHOS 114 104 96     Amylase: No results for input(s): AMYLASE in the last 72 hours. Lipase:   Recent Labs     09/18/20  1039 09/19/20  0611   LIPASE 30.0 40.0     Mag:      Recent Labs     09/19/20  0611 09/20/20  0711   MG 1.90 1.70*      Phos:     Recent Labs     09/19/20  0611 09/20/20  0711   PHOS 2.5 2.8      Coags:   Recent Labs     09/19/20  1008   INR 1.14   APTT 26.2       Cultures:  Relevant cultures documented under results section     Pathology:   No relevant pathology     Imaging:  I have personally reviewed the following films:    Us Renal Complete    Result Date: 9/19/2020  EXAMINATION: RETROPERITONEAL ULTRASOUND OF THE KIDNEYS AND URINARY BLADDER 9/19/2020 COMPARISON: 09/18/2020 CT HISTORY: ORDERING SYSTEM PROVIDED HISTORY: abnormal ct TECHNOLOGIST PROVIDED HISTORY: Reason for exam:->abnormal ct Complex cystic lesion at the upper pole of the left kidney. FINDINGS: Kidneys: The right kidney measures 8.7 x 5.0 x 5.1 cm in length and the left kidney measures 8.3 x 4.6 x 6.1 cm in length. The kidneys demonstrate cortical thinning but normal cortical echogenicity. There is no evidence of nephrolithiasis or hydronephrosis. No cystic or solid mass can be appreciated with ultrasound. Bladder: Unremarkable appearance of the bladder. No significant post void residual.     1. Small kidneys with cortical thinning suggests chronic medical renal disease. 2. No ultrasound evidence of cyst to correlate to the prior CT. Typical follow-up of a complex cystic lesion of the kidney would be for the patient to undergo an MRI of the abdomen with and without contrast using a dedicated renal protocol.      Ct Abdomen Pelvis W Iv Contrast Additional Contrast? None    Result Date: 9/18/2020  EXAMINATION: CT OF THE ABDOMEN AND PELVIS WITH CONTRAST 9/18/2020 11:13 am TECHNIQUE: CT of the abdomen and pelvis was performed with the administration of intravenous contrast. Multiplanar reformatted images are provided for review. Dose modulation, iterative reconstruction, and/or weight based adjustment of the mA/kV was utilized to reduce the radiation dose to as low as reasonably achievable. COMPARISON: None. HISTORY: ORDERING SYSTEM PROVIDED HISTORY: abd pain, distention, r/o SBO TECHNOLOGIST PROVIDED HISTORY: Reason for exam:->abd pain, distention, r/o SBO Additional Contrast?->None Reason for Exam: abd pain, distention, r/o SBO Acuity: Acute Type of Exam: Initial Former smoker with 30 pack year history. FINDINGS: Lower Chest: Inferior lung bases are clear. Heart size is normal. Organs: Liver is normal in appearance without worrisome focal lesion. Prominent gallstone in the neck of the gallbladder. Gallbladder wall is thickened and mild pericholecystic inflammatory stranding present. 13 mm low-density lesion posteriorly in the upper left kidney not clearly a simple cyst.  Very small cortical simple cyst posteriorly in the upper right kidney. Other abdominal organs appear normal. GI/Bowel: Prior right hemicolectomy with entero colonic anastomosis anterior to the mid liver. No bowel obstruction. Severe sigmoid diverticulosis without diverticulitis. Pelvis: Uterus remains and appears normal for age. Urinary bladder appears normal.  No free fluid or adenopathy. Peritoneum/Retroperitoneum: No mass, adenopathy, or ascites. Normal size of the aorta. No free air. Bones/Soft Tissues: No acute abnormality. Cholelithiasis and findings of mild acute cholecystitis. Indeterminate 13 mm cystic appearing lesion in the posterior upper left kidney. Lesion not seen on CT chest 05/23/2017. No other significant abnormality. RECOMMENDATIONS: Non urgent renal ultrasound suggested for further evaluation the left renal lesion.      Us Gallbladder Ruq    Result Date: 9/18/2020  EXAMINATION: RIGHT UPPER QUADRANT ULTRASOUND 9/18/2020 9:35 pm COMPARISON: CT abdomen pelvis 09/18/2020 HISTORY: ORDERING SYSTEM PROVIDED HISTORY: cholecystitis TECHNOLOGIST PROVIDED HISTORY: Reason for exam:->cholecystitis FINDINGS: LIVER:  The liver demonstrates normal echogenicity without evidence of intrahepatic biliary ductal dilatation. BILIARY SYSTEM:  Cholelithiasis with gallbladder wall thickening and positive sonographic Madrid's sign. Common bile duct is within normal limits measuring 4 mm. RIGHT KIDNEY: The right kidney is grossly unremarkable without evidence of hydronephrosis. PANCREAS:  Visualized portions of the pancreas are unremarkable. OTHER: No evidence of right upper quadrant ascites. Acute cholecystitis. No choledocholithiasis or duct dilatation. Scheduled Meds:   docusate sodium  100 mg Oral BID    polyethylene glycol  17 g Oral Daily    potassium chloride  40 mEq Oral BID WC    amLODIPine  10 mg Oral Daily    metoprolol tartrate  25 mg Oral BID    tiotropium  2 puff Inhalation Daily    sodium chloride flush  10 mL Intravenous 2 times per day    ciprofloxacin  400 mg Intravenous Q12H    metroNIDAZOLE  500 mg Intravenous Q8H    budesonide-formoterol  2 puff Inhalation BID     Continuous Infusions:   sodium chloride 100 mL/hr at 09/20/20 0614     PRN Meds:.bisacodyl, morphine, sodium chloride flush, acetaminophen **OR** acetaminophen, promethazine **OR** ondansetron, hydrALAZINE      Assessment:  Patient Active Problem List   Diagnosis    Acute on chronic respiratory failure (HCC)    Chronic obstructive pulmonary disease with acute exacerbation (HCC)    HTN (hypertension)    Enlarged thyroid & cyst per CT chest 6/2015    Centrilobular emphysema (HCC)    Chronic respiratory failure with hypoxia (HCC)    Solitary pulmonary nodule    Cholelithiasis    Cholecystitis     Cholecystitis  UTI  Thrombocytopenia  COPD on home oxygen  History of colon cancer     Plan:  1. Keep on clear liquids, monitor pain  2. Monitor bowel function, complains of constipation but improving with bowel regimen  3.  Antibiotics to cover both UTI and cholecystitis  4. Needs medical stabilization prior to consideration of surgical cholecystectomy. Responding to medical management and anticipate possible consider cholecystectomy later this admission vs electively. If decompensates prior to medical stabilization, may consider cholecystostomy drain  5. Monitor thrombocytopenia, recommend hematology evaluation for thrombocytopenia, will check hepatitis panel as patient does have remote history of jaundice  6. Defer management of remainder of management of remainder of medical comorbidities to primary and consulting teams    Joseph Simms MD, FACS  9/20/2020  11:32 AM

## 2020-09-20 NOTE — PROGRESS NOTES
9337 Patient assessment complete. Took medications without difficulties. Denies any needs at this time. Care plan and education reviewed and agreed upon with patient. Bed in low position, call light within reach. Will continue to monitor. [address] Jostin Liu per MD order.

## 2020-09-21 LAB
A/G RATIO: 1.4 (ref 1.1–2.2)
ALBUMIN SERPL-MCNC: 3.7 G/DL (ref 3.4–5)
ALP BLD-CCNC: 98 U/L (ref 40–129)
ALT SERPL-CCNC: 10 U/L (ref 10–40)
ANION GAP SERPL CALCULATED.3IONS-SCNC: 6 MMOL/L (ref 3–16)
ANTI-NUCLEAR ANTIBODY (ANA): NEGATIVE
AST SERPL-CCNC: 16 U/L (ref 15–37)
BASOPHILS ABSOLUTE: 0.1 K/UL (ref 0–0.2)
BASOPHILS RELATIVE PERCENT: 0.9 %
BILIRUB SERPL-MCNC: 0.3 MG/DL (ref 0–1)
BUN BLDV-MCNC: 11 MG/DL (ref 7–20)
CALCIUM SERPL-MCNC: 8.9 MG/DL (ref 8.3–10.6)
CHLORIDE BLD-SCNC: 102 MMOL/L (ref 99–110)
CO2: 31 MMOL/L (ref 21–32)
CREAT SERPL-MCNC: 0.8 MG/DL (ref 0.6–1.2)
EOSINOPHILS ABSOLUTE: 0.1 K/UL (ref 0–0.6)
EOSINOPHILS RELATIVE PERCENT: 1 %
GFR AFRICAN AMERICAN: >60
GFR NON-AFRICAN AMERICAN: >60
GLOBULIN: 2.6 G/DL
GLUCOSE BLD-MCNC: 141 MG/DL (ref 70–99)
HCT VFR BLD CALC: 31.8 % (ref 36–48)
HEMOGLOBIN: 10.6 G/DL (ref 12–16)
HEPARIN INDUCED PLATELET ANTIBODY: NEGATIVE
LYMPHOCYTES ABSOLUTE: 0.9 K/UL (ref 1–5.1)
LYMPHOCYTES RELATIVE PERCENT: 15.9 %
MCH RBC QN AUTO: 28.6 PG (ref 26–34)
MCHC RBC AUTO-ENTMCNC: 33.4 G/DL (ref 31–36)
MCV RBC AUTO: 85.7 FL (ref 80–100)
MONOCYTES ABSOLUTE: 0.6 K/UL (ref 0–1.3)
MONOCYTES RELATIVE PERCENT: 10.2 %
NEUTROPHILS ABSOLUTE: 4.1 K/UL (ref 1.7–7.7)
NEUTROPHILS RELATIVE PERCENT: 72 %
PDW BLD-RTO: 14.9 % (ref 12.4–15.4)
PLATELET # BLD: 51 K/UL (ref 135–450)
PMV BLD AUTO: 12.3 FL (ref 5–10.5)
POTASSIUM SERPL-SCNC: 4.3 MMOL/L (ref 3.5–5.1)
RBC # BLD: 3.72 M/UL (ref 4–5.2)
SODIUM BLD-SCNC: 139 MMOL/L (ref 136–145)
TOTAL PROTEIN: 6.3 G/DL (ref 6.4–8.2)
WBC # BLD: 5.7 K/UL (ref 4–11)

## 2020-09-21 PROCEDURE — 36415 COLL VENOUS BLD VENIPUNCTURE: CPT

## 2020-09-21 PROCEDURE — 2580000003 HC RX 258: Performed by: FAMILY MEDICINE

## 2020-09-21 PROCEDURE — 99232 SBSQ HOSP IP/OBS MODERATE 35: CPT | Performed by: SURGERY

## 2020-09-21 PROCEDURE — APPSS15 APP SPLIT SHARED TIME 0-15 MINUTES: Performed by: NURSE PRACTITIONER

## 2020-09-21 PROCEDURE — 6370000000 HC RX 637 (ALT 250 FOR IP): Performed by: FAMILY MEDICINE

## 2020-09-21 PROCEDURE — 6370000000 HC RX 637 (ALT 250 FOR IP): Performed by: INTERNAL MEDICINE

## 2020-09-21 PROCEDURE — 6370000000 HC RX 637 (ALT 250 FOR IP): Performed by: SURGERY

## 2020-09-21 PROCEDURE — 83550 IRON BINDING TEST: CPT

## 2020-09-21 PROCEDURE — 85025 COMPLETE CBC W/AUTO DIFF WBC: CPT

## 2020-09-21 PROCEDURE — 94640 AIRWAY INHALATION TREATMENT: CPT

## 2020-09-21 PROCEDURE — APPNB30 APP NON BILLABLE TIME 0-30 MINS: Performed by: NURSE PRACTITIONER

## 2020-09-21 PROCEDURE — 80053 COMPREHEN METABOLIC PANEL: CPT

## 2020-09-21 PROCEDURE — 2500000003 HC RX 250 WO HCPCS: Performed by: SURGERY

## 2020-09-21 PROCEDURE — 2580000003 HC RX 258: Performed by: INTERNAL MEDICINE

## 2020-09-21 PROCEDURE — 6370000000 HC RX 637 (ALT 250 FOR IP): Performed by: NURSE PRACTITIONER

## 2020-09-21 PROCEDURE — 1200000000 HC SEMI PRIVATE

## 2020-09-21 PROCEDURE — 83540 ASSAY OF IRON: CPT

## 2020-09-21 PROCEDURE — 82728 ASSAY OF FERRITIN: CPT

## 2020-09-21 PROCEDURE — 6360000002 HC RX W HCPCS: Performed by: INTERNAL MEDICINE

## 2020-09-21 RX ORDER — LANOLIN ALCOHOL/MO/W.PET/CERES
500 CREAM (GRAM) TOPICAL DAILY
Status: DISCONTINUED | OUTPATIENT
Start: 2020-09-21 | End: 2020-09-23 | Stop reason: HOSPADM

## 2020-09-21 RX ADMIN — SODIUM CHLORIDE: 9 INJECTION, SOLUTION INTRAVENOUS at 05:46

## 2020-09-21 RX ADMIN — METOPROLOL TARTRATE 25 MG: 25 TABLET, FILM COATED ORAL at 22:17

## 2020-09-21 RX ADMIN — ACETAMINOPHEN 650 MG: 325 TABLET ORAL at 07:20

## 2020-09-21 RX ADMIN — Medication 10 ML: at 22:17

## 2020-09-21 RX ADMIN — Medication 2 PUFF: at 21:03

## 2020-09-21 RX ADMIN — METRONIDAZOLE 500 MG: 500 INJECTION, SOLUTION INTRAVENOUS at 05:46

## 2020-09-21 RX ADMIN — Medication 1 G: at 13:49

## 2020-09-21 RX ADMIN — CYANOCOBALAMIN TAB 1000 MCG 500 MCG: 1000 TAB at 09:09

## 2020-09-21 RX ADMIN — DOCUSATE SODIUM 100 MG: 100 CAPSULE, LIQUID FILLED ORAL at 09:08

## 2020-09-21 RX ADMIN — POTASSIUM CHLORIDE 40 MEQ: 1500 TABLET, EXTENDED RELEASE ORAL at 09:13

## 2020-09-21 RX ADMIN — METOPROLOL TARTRATE 25 MG: 25 TABLET, FILM COATED ORAL at 09:09

## 2020-09-21 RX ADMIN — AMLODIPINE BESYLATE 10 MG: 5 TABLET ORAL at 09:09

## 2020-09-21 RX ADMIN — Medication 2 PUFF: at 10:53

## 2020-09-21 RX ADMIN — TIOTROPIUM BROMIDE INHALATION SPRAY 2 PUFF: 3.12 SPRAY, METERED RESPIRATORY (INHALATION) at 10:54

## 2020-09-21 RX ADMIN — POLYETHYLENE GLYCOL 3350 17 G: 17 POWDER, FOR SOLUTION ORAL at 11:07

## 2020-09-21 RX ADMIN — METRONIDAZOLE 500 MG: 500 INJECTION, SOLUTION INTRAVENOUS at 16:53

## 2020-09-21 ASSESSMENT — PAIN SCALES - GENERAL
PAINLEVEL_OUTOF10: 0
PAINLEVEL_OUTOF10: 0
PAINLEVEL_OUTOF10: 4

## 2020-09-21 NOTE — PROGRESS NOTES
98 °F (36.7 °C) (Oral)   Resp 16   Ht 4' 9\" (1.448 m)   Wt 190 lb (86.2 kg)   SpO2 96%   BMI 41.12 kg/m²     Intake/Output Summary (Last 24 hours) at 9/21/2020 0901  Last data filed at 9/21/2020 0530  Gross per 24 hour   Intake 3639 ml   Output --   Net 3639 ml      Wt Readings from Last 3 Encounters:   09/18/20 190 lb (86.2 kg)   05/15/17 197 lb (89.4 kg)   10/14/16 201 lb 12.8 oz (91.5 kg)       General appearance:  Appears comfortable  Eyes: Sclera clear. Pupils equal.  ENT: Moist oral mucosa. Trachea midline, no adenopathy. Cardiovascular: Regular rhythm, normal S1, S2. No murmur. No edema in lower extremities  Respiratory: Not using accessory muscles. Good inspiratory effort. Clear to auscultation bilaterally, no wheeze or crackles. GI: Abdomen soft, RUQ tenderness, obese, not distended, normal bowel sounds  Musculoskeletal: No cyanosis in digits, neck supple  Neurology: Grossly intact. No speech or motor deficits  Psych: Normal affect. Alert and oriented in time, place and person  Skin: Warm, dry, normal turgor  Extremity exam shows brisk capillary refill. Peripheral pulses are palpable in lower extremities     Labs and Tests:  CBC:   Recent Labs     09/19/20  1008 09/20/20  0711 09/21/20  0610   WBC 7.7 6.4 5.7   HGB 12.0 10.4* 10.6*   PLT 49* 43* 51*     BMP:    Recent Labs     09/19/20  0611 09/20/20  0711 09/21/20  0610    136 139   K 3.7 4.4 4.3   CL 98* 99 102   CO2 28 32 31   BUN 13 13 11   CREATININE 0.8 0.9 0.8   GLUCOSE 149* 121* 141*     Hepatic:   Recent Labs     09/19/20  0611 09/20/20  0711 09/21/20  0610   AST 17 14* 16   ALT 10 8* 10   BILITOT 0.3 0.3 0.3   ALKPHOS 104 96 98     US RENAL COMPLETE   Final Result   1. Small kidneys with cortical thinning suggests chronic medical renal   disease. 2. No ultrasound evidence of cyst to correlate to the prior CT.   Typical   follow-up of a complex cystic lesion of the kidney would be for the patient   to undergo an MRI of the abdomen with and without contrast using a dedicated   renal protocol. US GALLBLADDER RUQ   Final Result   Acute cholecystitis. No choledocholithiasis or duct dilatation. CT ABDOMEN PELVIS W IV CONTRAST Additional Contrast? None   Final Result   Cholelithiasis and findings of mild acute cholecystitis. Indeterminate 13 mm cystic appearing lesion in the posterior upper left   kidney. Lesion not seen on CT chest 05/23/2017. No other significant   abnormality. RECOMMENDATIONS:   Non urgent renal ultrasound suggested for further evaluation the left renal   lesion. Problem List  Active Problems:    Cholecystitis  Resolved Problems:    * No resolved hospital problems. *       Assessment & Plan:   1. CLD per Surgery  2. Cont Flagyl and Rocephin IV for acute cholecystitis  3. Will be high risk outpatient/elective surgery  4. Cont O2 via NC  5. Cont Norvasc and Lopressor  6. Cont Spiriva and Symbicort  7. PT/OT eval for dispo    IV Access: Peripheral  Newman: No  Diet: DIET CLEAR LIQUID;  Code:Full Code  DVT PPX SCD  Disposition Home    Discussed with patient, Dr Dang Yañez (Surgery), nursing and CM. If surgery needed, will be best as inpatient. She is a high risk given underlying lung disease. Platelets are improving.       Jayjay Corral MD   9/21/2020 9:01 AM

## 2020-09-21 NOTE — PROGRESS NOTES
2000: Head to toe completed at this time. Patient noted with c/o SOB upon exertion, O2 at 94% on 3L. Lungs noted clear with diminished bases after PRN duoneb. Plan of care reviewed and agreed upon with patient at this time. Fall preventions in place and effective. All needs addressed at this time, VSS, bed in lowest position with call light in reach.

## 2020-09-21 NOTE — PLAN OF CARE
Problem: Skin Integrity:  Goal: Will show no infection signs and symptoms  Description: Will show no infection signs and symptoms  9/20/2020 2139 by Sancho Washington RN  Outcome: Ongoing  9/20/2020 2001 by Noe Moe  Outcome: Ongoing  Goal: Absence of new skin breakdown  Description: Absence of new skin breakdown  9/20/2020 2139 by Sancho Washington RN  Outcome: Ongoing  9/20/2020 2001 by Noe Moe  Outcome: Ongoing     Problem: Falls - Risk of:  Goal: Will remain free from falls  Description: Will remain free from falls  9/20/2020 2139 by Sancho Washington RN  Outcome: Ongoing  9/20/2020 2001 by Noe Moe  Outcome: Ongoing  Goal: Absence of physical injury  Description: Absence of physical injury  9/20/2020 2139 by Sancho Washington RN  Outcome: Ongoing  9/20/2020 2001 by Noe Moe  Outcome: Ongoing     Problem: Pain:  Goal: Pain level will decrease  Description: Pain level will decrease  9/20/2020 2139 by Sancho Washington RN  Outcome: Ongoing  9/20/2020 2001 by Noe Moe  Outcome: Ongoing  Goal: Control of acute pain  Description: Control of acute pain  9/20/2020 2139 by Sancho Washington RN  Outcome: Ongoing  9/20/2020 2001 by Noe Moe  Outcome: Ongoing  Goal: Control of chronic pain  Description: Control of chronic pain  9/20/2020 2139 by Sancho Washington RN  Outcome: Ongoing  9/20/2020 2001 by Noe Moe  Outcome: Ongoing

## 2020-09-21 NOTE — PROGRESS NOTES
Pt resting awake in bed. Reports x 2 large loose BM today. States abdominal discomfort has lessened since having BMs. Denies need for pain medication at this time. Tolerated full liquids well.

## 2020-09-21 NOTE — PROGRESS NOTES
Pt resting awake in bed. Denies pain at this time. declinbed clear liquids for breakfast stating lack of appetite. PO meds taken well with the exception of her K which pt states historically gives her Dry heaves. Page placed to hospitalist at pt's request to see if she can stop taking potassium. Pt is axox4 and able to answer questions and follow commands throughout assessment. Denies other needs at this time.

## 2020-09-21 NOTE — PROGRESS NOTES
[facility]        Progress Note    Pt Name: Robson Marti  MRN: [id number]  YOB: 1940  Date of evaluation: 2020    Chief Complaint: Abdominal pain      Subjective:    No acute events overnight  Pain improving, controlled--none at rest  Reports nausea only with taking pills, particularly potassium; no vomiting, otherwise tolerating clear liquids  Complains of dyspnea with exertion to bathroom, denies chest pain  Passing stool      Vital Signs:  Patient Vitals for the past 24 hrs:   BP Temp Temp src Pulse Resp SpO2   20 1308 (!) 154/60 98.2 °F (36.8 °C) Oral 83 16 94 %   20 0800 (!) 144/72 98 °F (36.7 °C) Oral 78 16 96 %   20 0530 (!) 155/62 97.9 °F (36.6 °C) Oral 89 19 96 %   20 2343 (!) 149/79 98.4 °F (36.9 °C) Oral 87 16 96 %   20 2200 (!) 146/73 -- -- 80 -- --   20 2000 (!) 174/72 98.3 °F (36.8 °C) Oral 106 19 94 %   20 1829 -- -- -- -- 18 97 %   20 1723 (!) 146/72 98.1 °F (36.7 °C) Oral 86 16 97 %      TEMPERATURE HISTORY 24H: Temp (24hrs), Av.2 °F (36.8 °C), Min:97.9 °F (36.6 °C), Max:98.4 °F (36.9 °C)    BLOOD PRESSURE HISTORY: Systolic (06EIX), OVV:827 , Min:122 , HLA:974    Diastolic (41EHG), GCX:43, Min:60, Max:79      Intake/Output:    I/O last 3 completed shifts: In: 1031 [P.O.:240; I.V.:3399]  Out: -   No intake/output data recorded.   Drain/tube Output:           Physical Exam:  General: awake, alert, oriented to  person, place, time  Cardiac: regular rate and rhythm   Pulmonary: clear to auscultation bilaterally   Abdomen: soft, non-distended, minimal right upper quadrant/epigastric tenderness without peritonitis, active bowel sounds    Labs:  CBC:    Recent Labs     20  1008 20  0711 20  0610   WBC 7.7 6.4 5.7   HGB 12.0 10.4* 10.6*   HCT 36.0 32.0* 31.8*   PLT 49* 43* 51*     BMP:    Recent Labs     20  0611 20  0711 20  0610    136 139   K 3.7 4.4 4.3   CL 98* 99 102   CO2 28 32 31   BUN 13 13 11   CREATININE 0.8 0.9 0.8   GLUCOSE 149* 121* 141*     Hepatic:   Recent Labs     09/19/20  0611 09/20/20  0711 09/21/20  0610   AST 17 14* 16   ALT 10 8* 10   BILITOT 0.3 0.3 0.3   ALKPHOS 104 96 98     Amylase: No results for input(s): AMYLASE in the last 72 hours. Lipase:   Recent Labs     09/19/20  0611   LIPASE 40.0     Mag:      Recent Labs     09/19/20  0611 09/20/20  0711   MG 1.90 1.70*      Phos:     Recent Labs     09/19/20  0611 09/20/20  0711   PHOS 2.5 2.8      Coags:   Recent Labs     09/19/20  1008   INR 1.14   APTT 26.2       Cultures:  Relevant cultures documented under results section     Pathology:   No relevant pathology     Imaging:  I have personally reviewed the following films:    No results found.     Scheduled Meds:   vitamin B-12  500 mcg Oral Daily    cefTRIAXone (ROCEPHIN) IV  1 g Intravenous Q24H    docusate sodium  100 mg Oral BID    polyethylene glycol  17 g Oral Daily    potassium chloride  40 mEq Oral BID WC    amLODIPine  10 mg Oral Daily    metoprolol tartrate  25 mg Oral BID    tiotropium  2 puff Inhalation Daily    sodium chloride flush  10 mL Intravenous 2 times per day    metroNIDAZOLE  500 mg Intravenous Q8H    budesonide-formoterol  2 puff Inhalation BID     Continuous Infusions:   sodium chloride 60 mL/hr at 09/21/20 0546     PRN Meds:.ipratropium-albuterol, bisacodyl, morphine, sodium chloride flush, acetaminophen **OR** acetaminophen, promethazine **OR** ondansetron, hydrALAZINE      Assessment:  Patient Active Problem List   Diagnosis    Acute on chronic respiratory failure (HCC)    Chronic obstructive pulmonary disease with acute exacerbation (HCC)    HTN (hypertension)    Enlarged thyroid & cyst per CT chest 6/2015    Centrilobular emphysema (HCC)    Chronic respiratory failure with hypoxia (HCC)    Solitary pulmonary nodule    Cholelithiasis    Cholecystitis Cholecystitis  UTI  Thrombocytopenia  COPD on home oxygen  History of colon cancer      Plan:  1. Timing of cholecystectomy to be determined following medical workup  2. Advance to full diet as tolerated  3. IV hydration until PO intake is adequate; monitor and correct electrolytes  4. Antibiotics  5. Activity as tolerated, ambulate TID, up to chair for all meals  6. Pulmonary toilet, incentive spirometry  7. PRN analgesics and antiemetics--minimizing narcotics as tolerated, transition to PO  8. Management of medical comorbid etiologies per primary team and consulting services    EDUCATION:  Educated patient on plan of care and disease process--all questions answered. Plans discussed with patient and nursing. Reviewed and discussed with Dr. Amrit Ca. Signed:  Farhad South, APRN - CNP  9/21/2020 3:29 PM    I have personally performed a face to face diagnostic evaluation on this patient. I have interviewed and examined the patient and I agree with the assessment above. In summary, my findings and plan are the following:   Ms. Daniel Matson is a 79 y.o. female who presents with   Cholecystitis  UTI  Thrombocytopenia  COPD on home oxygen  History of colon cancer     Plan:  1. Nausea medication related, tolerated clear liquids, advance to full liquids  2. Bowel function normalizing, passing stool  3. Antibiotics to cover both UTI and cholecystitis  4. Monitor thrombocytopenia, recommend hematology evaluation for thrombocytopenia, will check hepatitis panel as patient does have remote history of jaundice  5. Needs medical stabilization prior to consideration of surgical cholecystectomy, possible cholecystectomy later this admission vs elective procedure pending response to current management. If decompensates prior to medical stabilization, may consider cholecystostomy drain  6. Defer management of remainder of management of remainder of medical comorbidities to primary and consulting teams    Joseph Ca MD,

## 2020-09-22 PROBLEM — J44.9 COPD (CHRONIC OBSTRUCTIVE PULMONARY DISEASE) (HCC): Status: ACTIVE | Noted: 2020-09-22

## 2020-09-22 PROBLEM — E66.01 MORBID OBESITY WITH BMI OF 40.0-44.9, ADULT (HCC): Status: ACTIVE | Noted: 2020-09-22

## 2020-09-22 PROBLEM — D69.6 THROMBOCYTOPENIA (HCC): Status: ACTIVE | Noted: 2020-09-22

## 2020-09-22 PROBLEM — D64.9 ANEMIA: Status: ACTIVE | Noted: 2020-09-22

## 2020-09-22 PROBLEM — K81.0 ACUTE CHOLECYSTITIS: Status: ACTIVE | Noted: 2020-09-18

## 2020-09-22 LAB
A/G RATIO: 1.4 (ref 1.1–2.2)
ALBUMIN SERPL-MCNC: 3.8 G/DL (ref 3.4–5)
ALP BLD-CCNC: 105 U/L (ref 40–129)
ALT SERPL-CCNC: 10 U/L (ref 10–40)
ANION GAP SERPL CALCULATED.3IONS-SCNC: 7 MMOL/L (ref 3–16)
AST SERPL-CCNC: 18 U/L (ref 15–37)
BASOPHILS ABSOLUTE: 0 K/UL (ref 0–0.2)
BASOPHILS RELATIVE PERCENT: 0.6 %
BILIRUB SERPL-MCNC: 0.3 MG/DL (ref 0–1)
BLOOD CULTURE, ROUTINE: NORMAL
BUN BLDV-MCNC: 8 MG/DL (ref 7–20)
CALCIUM SERPL-MCNC: 9.4 MG/DL (ref 8.3–10.6)
CHLORIDE BLD-SCNC: 101 MMOL/L (ref 99–110)
CO2: 33 MMOL/L (ref 21–32)
CREAT SERPL-MCNC: 0.8 MG/DL (ref 0.6–1.2)
EOSINOPHILS ABSOLUTE: 0.1 K/UL (ref 0–0.6)
EOSINOPHILS RELATIVE PERCENT: 1.9 %
FERRITIN: 168.6 NG/ML (ref 15–150)
GFR AFRICAN AMERICAN: >60
GFR NON-AFRICAN AMERICAN: >60
GLOBULIN: 2.8 G/DL
GLUCOSE BLD-MCNC: 109 MG/DL (ref 70–99)
HCT VFR BLD CALC: 36.1 % (ref 36–48)
HEMOGLOBIN: 11.9 G/DL (ref 12–16)
IRON SATURATION: 14 % (ref 15–50)
IRON: 32 UG/DL (ref 37–145)
LYMPHOCYTES ABSOLUTE: 1.5 K/UL (ref 1–5.1)
LYMPHOCYTES RELATIVE PERCENT: 22.4 %
MCH RBC QN AUTO: 28.6 PG (ref 26–34)
MCHC RBC AUTO-ENTMCNC: 33 G/DL (ref 31–36)
MCV RBC AUTO: 86.6 FL (ref 80–100)
MONOCYTES ABSOLUTE: 0.6 K/UL (ref 0–1.3)
MONOCYTES RELATIVE PERCENT: 8.3 %
NEUTROPHILS ABSOLUTE: 4.5 K/UL (ref 1.7–7.7)
NEUTROPHILS RELATIVE PERCENT: 66.8 %
PDW BLD-RTO: 15 % (ref 12.4–15.4)
PLATELET # BLD: 65 K/UL (ref 135–450)
PMV BLD AUTO: 11.7 FL (ref 5–10.5)
POTASSIUM SERPL-SCNC: 4.5 MMOL/L (ref 3.5–5.1)
RBC # BLD: 4.16 M/UL (ref 4–5.2)
SODIUM BLD-SCNC: 141 MMOL/L (ref 136–145)
TOTAL IRON BINDING CAPACITY: 230 UG/DL (ref 260–445)
TOTAL PROTEIN: 6.6 G/DL (ref 6.4–8.2)
WBC # BLD: 6.7 K/UL (ref 4–11)

## 2020-09-22 PROCEDURE — 94640 AIRWAY INHALATION TREATMENT: CPT

## 2020-09-22 PROCEDURE — 6360000002 HC RX W HCPCS: Performed by: INTERNAL MEDICINE

## 2020-09-22 PROCEDURE — 80053 COMPREHEN METABOLIC PANEL: CPT

## 2020-09-22 PROCEDURE — 6370000000 HC RX 637 (ALT 250 FOR IP): Performed by: FAMILY MEDICINE

## 2020-09-22 PROCEDURE — APPNB30 APP NON BILLABLE TIME 0-30 MINS: Performed by: NURSE PRACTITIONER

## 2020-09-22 PROCEDURE — 6370000000 HC RX 637 (ALT 250 FOR IP): Performed by: NURSE PRACTITIONER

## 2020-09-22 PROCEDURE — 2580000003 HC RX 258: Performed by: FAMILY MEDICINE

## 2020-09-22 PROCEDURE — 99232 SBSQ HOSP IP/OBS MODERATE 35: CPT | Performed by: SURGERY

## 2020-09-22 PROCEDURE — 2500000003 HC RX 250 WO HCPCS: Performed by: SURGERY

## 2020-09-22 PROCEDURE — 1200000000 HC SEMI PRIVATE

## 2020-09-22 PROCEDURE — 85025 COMPLETE CBC W/AUTO DIFF WBC: CPT

## 2020-09-22 PROCEDURE — APPSS15 APP SPLIT SHARED TIME 0-15 MINUTES: Performed by: NURSE PRACTITIONER

## 2020-09-22 PROCEDURE — 6370000000 HC RX 637 (ALT 250 FOR IP): Performed by: INTERNAL MEDICINE

## 2020-09-22 RX ADMIN — Medication 1 G: at 13:08

## 2020-09-22 RX ADMIN — Medication 10 ML: at 20:44

## 2020-09-22 RX ADMIN — TIOTROPIUM BROMIDE INHALATION SPRAY 2 PUFF: 3.12 SPRAY, METERED RESPIRATORY (INHALATION) at 08:02

## 2020-09-22 RX ADMIN — POTASSIUM CHLORIDE 40 MEQ: 1500 TABLET, EXTENDED RELEASE ORAL at 09:03

## 2020-09-22 RX ADMIN — METOPROLOL TARTRATE 25 MG: 25 TABLET, FILM COATED ORAL at 20:41

## 2020-09-22 RX ADMIN — METRONIDAZOLE 500 MG: 500 INJECTION, SOLUTION INTRAVENOUS at 07:01

## 2020-09-22 RX ADMIN — POTASSIUM CHLORIDE 40 MEQ: 1500 TABLET, EXTENDED RELEASE ORAL at 17:21

## 2020-09-22 RX ADMIN — Medication 2 PUFF: at 19:35

## 2020-09-22 RX ADMIN — METRONIDAZOLE 500 MG: 500 INJECTION, SOLUTION INTRAVENOUS at 23:09

## 2020-09-22 RX ADMIN — CYANOCOBALAMIN TAB 1000 MCG 500 MCG: 1000 TAB at 09:02

## 2020-09-22 RX ADMIN — AMLODIPINE BESYLATE 10 MG: 5 TABLET ORAL at 09:03

## 2020-09-22 RX ADMIN — METOPROLOL TARTRATE 25 MG: 25 TABLET, FILM COATED ORAL at 09:03

## 2020-09-22 RX ADMIN — Medication 2 PUFF: at 08:03

## 2020-09-22 RX ADMIN — METRONIDAZOLE 500 MG: 500 INJECTION, SOLUTION INTRAVENOUS at 00:28

## 2020-09-22 RX ADMIN — METRONIDAZOLE 500 MG: 500 INJECTION, SOLUTION INTRAVENOUS at 15:23

## 2020-09-22 ASSESSMENT — PAIN SCALES - GENERAL
PAINLEVEL_OUTOF10: 0
PAINLEVEL_OUTOF10: 0

## 2020-09-22 NOTE — PROGRESS NOTES
[facility]    9/22/2020 2:52 PM        Name: Britt Guillen . Admitted: 9/18/2020  Primary Care Provider: Garret Ansari (Tel: None)    Brief Course:  80 yo F with COPD, chronic respiratory failure with hypoxia, HTN, morbid obesity came to ER with abdominal pain. Admitted as inpatient for acute cholecystitis with acute thrombocytopenia. Followed by Surgery and Oncology. HIT negative. Thrombocytopenia due to infection. Started on IV Abx. CC:  Abdominal pain    Subjective:  . Patient with much less RUQ pain and no nausea. No CP, SOB, HA or fevers. No diarrhea.     Reviewed interval ancillary notes    Current Medications  vitamin B-12 (CYANOCOBALAMIN) tablet 500 mcg, Daily  cefTRIAXone (ROCEPHIN) 1 g in sterile water 10 mL IV syringe, Q24H  ipratropium-albuterol (DUONEB) nebulizer solution 1 ampule, Q4H PRN  docusate sodium (COLACE) capsule 100 mg, BID  bisacodyl (DULCOLAX) suppository 10 mg, Daily PRN  polyethylene glycol (GLYCOLAX) packet 17 g, Daily  potassium chloride (KLOR-CON M) extended release tablet 40 mEq, BID WC  amLODIPine (NORVASC) tablet 10 mg, Daily  metoprolol tartrate (LOPRESSOR) tablet 25 mg, BID  tiotropium (SPIRIVA RESPIMAT) 2.5 MCG/ACT inhaler 2 puff, Daily  morphine injection 4 mg, Q4H PRN  sodium chloride flush 0.9 % injection 10 mL, 2 times per day  sodium chloride flush 0.9 % injection 10 mL, PRN  acetaminophen (TYLENOL) tablet 650 mg, Q6H PRN    Or  acetaminophen (TYLENOL) suppository 650 mg, Q6H PRN  promethazine (PHENERGAN) tablet 12.5 mg, Q6H PRN    Or  ondansetron (ZOFRAN) injection 4 mg, Q6H PRN  metronidazole (FLAGYL) 500 mg in NaCl 100 mL IVPB premix, Q8H  hydrALAZINE (APRESOLINE) injection 5 mg, Q4H PRN  budesonide-formoterol (SYMBICORT) 160-4.5 MCG/ACT inhaler 2 puff, BID        Objective:  /68   Pulse 68   Temp 97.8 °F (36.6 °C) (Temporal) Resp 16   Ht 4' 9\" (1.448 m)   Wt 190 lb (86.2 kg)   SpO2 97%   BMI 41.12 kg/m²     Intake/Output Summary (Last 24 hours) at 9/22/2020 1452  Last data filed at 9/22/2020 0451  Gross per 24 hour   Intake 301.76 ml   Output --   Net 301.76 ml      Wt Readings from Last 3 Encounters:   09/18/20 190 lb (86.2 kg)   05/15/17 197 lb (89.4 kg)   10/14/16 201 lb 12.8 oz (91.5 kg)       General appearance:  Appears comfortable  Eyes: Sclera clear. Pupils equal.  ENT: Moist oral mucosa. Trachea midline, no adenopathy. Cardiovascular: Regular rhythm, normal S1, S2. No murmur. No edema in lower extremities  Respiratory: Not using accessory muscles. Good inspiratory effort. Clear to auscultation bilaterally, no wheeze or crackles. GI: Abdomen soft, minimal RUQ tenderness, obese, not distended, normal bowel sounds  Musculoskeletal: No cyanosis in digits, neck supple  Neurology: Grossly intact. No speech or motor deficits  Psych: Normal affect. Alert and oriented in time, place and person  Skin: Warm, dry, normal turgor  Extremity exam shows brisk capillary refill. Peripheral pulses are palpable in lower extremities     Labs and Tests:  CBC:   Recent Labs     09/20/20  0711 09/21/20  0610 09/22/20  0635   WBC 6.4 5.7 6.7   HGB 10.4* 10.6* 11.9*   PLT 43* 51* 65*     BMP:    Recent Labs     09/20/20  0711 09/21/20  0610 09/22/20  0635    139 141   K 4.4 4.3 4.5   CL 99 102 101   CO2 32 31 33*   BUN 13 11 8   CREATININE 0.9 0.8 0.8   GLUCOSE 121* 141* 109*     Hepatic:   Recent Labs     09/20/20  0711 09/21/20  0610 09/22/20  0635   AST 14* 16 18   ALT 8* 10 10   BILITOT 0.3 0.3 0.3   ALKPHOS 96 98 105     US RENAL COMPLETE   Final Result   1. Small kidneys with cortical thinning suggests chronic medical renal   disease. 2. No ultrasound evidence of cyst to correlate to the prior CT.   Typical   follow-up of a complex cystic lesion of the kidney would be for the patient   to undergo an MRI of the abdomen with and without contrast using a dedicated   renal protocol. US GALLBLADDER RUQ   Final Result   Acute cholecystitis. No choledocholithiasis or duct dilatation. CT ABDOMEN PELVIS W IV CONTRAST Additional Contrast? None   Final Result   Cholelithiasis and findings of mild acute cholecystitis. Indeterminate 13 mm cystic appearing lesion in the posterior upper left   kidney. Lesion not seen on CT chest 05/23/2017. No other significant   abnormality. RECOMMENDATIONS:   Non urgent renal ultrasound suggested for further evaluation the left renal   lesion. Problem List  Principal Problem:    Acute cholecystitis  Active Problems:    HTN (hypertension)    Chronic respiratory failure with hypoxia (HCC)    Thrombocytopenia (HCC)    Morbid obesity with BMI of 40.0-44.9, adult (HCC)    COPD (chronic obstructive pulmonary disease) (HCC)    Anemia  Resolved Problems:    * No resolved hospital problems. *       Assessment & Plan:   1. Low fat diet per Surgery  2. Cont Flagyl and Rocephin IV for acute cholecystitis  3. Will be high risk outpatient/elective surgery  4. Cont O2 via NC  5. Cont Norvasc and Lopressor  6. Cont Spiriva and Symbicort  7. PT/OT eval for dispo    IV Access: Peripheral  Newman: No  Diet: DIET LOW FAT;  Code:Full Code  DVT PPX SCD  Disposition Home    Discussed with patient, Dr Aura Simms (Surgery) and nursing. Hopefully home tomorrow if continues improving. I would have low threshold to proceed to OR as inpatient if not improving. Platelets improving.     Jaxson Hernandez MD   9/22/2020 2:52 PM

## 2020-09-22 NOTE — PROGRESS NOTES
[facility]   Progress Note      SUBJECTIVE:      Feels okay. No external bleeding. No fever or chills. OBJECTIVE    Physical  VITALS:  BP (!) 154/60   Pulse 83   Temp 98.2 °F (36.8 °C) (Oral)   Resp 16   Ht 4' 9\" (1.448 m)   Wt 190 lb (86.2 kg)   SpO2 95%   BMI 41.12 kg/m²   TEMPERATURE:  Current - Temp: 98.2 °F (36.8 °C); Max - Temp  Av.1 °F (36.7 °C)  Min: 97.9 °F (36.6 °C)  Max: 98.4 °F (36.9 °C)  BLOOD PRESSURE RANGE:  Systolic (18GVF), VVE:841 , Min:144 , XUU:365   ; Diastolic (03OTY), GSC:28, Min:60, Max:79    24HR INTAKE/OUTPUT:      Intake/Output Summary (Last 24 hours) at 2020  Last data filed at 2020 0530  Gross per 24 hour   Intake 633 ml   Output --   Net 633 ml     Conscious alert oriented. Appears comfortable. No neck fullness. Respiratory efforts are normal.    Abdomen is not distended. No leg edema    No focal deficits.     Data  Labs:  General Labs:  CBC with Differential:    Lab Results   Component Value Date    WBC 5.7 2020    RBC 3.72 2020    HGB 10.6 2020    HCT 31.8 2020    PLT 51 2020    MCV 85.7 2020    MCH 28.6 2020    MCHC 33.4 2020    RDW 14.9 2020    SEGSPCT 63.1 2013    BANDSPCT 16.0 2012    LYMPHOPCT 15.9 2020    MONOPCT 10.2 2020    EOSPCT 0 2012    BASOPCT 0.9 2020    MONOSABS 0.6 2020    LYMPHSABS 0.9 2020    EOSABS 0.1 2020    BASOSABS 0.1 2020    DIFFTYPE Auto-K 2013     BMP:    Lab Results   Component Value Date     2020    K 4.3 2020     2020    CO2 31 2020    BUN 11 2020    LABALBU 3.7 2020    CREATININE 0.8 2020    CALCIUM 8.9 2020    GFRAA >60 2020    GFRAA >60 2013    LABGLOM >60 2020    GLUCOSE 141 2020     Hepatic Function Panel:    Lab Results   Component Value Date    ALKPHOS 98 2020    ALT 10 2020 AST 16 09/21/2020    PROT 6.3 09/21/2020    PROT 6.8 02/16/2013    BILITOT 0.3 09/21/2020    BILIDIR <0.2 09/19/2020    IBILI see below 09/19/2020    LABALBU 3.7 09/21/2020     LDH:  No results found for: LDH  PT/INR:    Lab Results   Component Value Date    PROTIME 13.3 09/19/2020    INR 1.14 09/19/2020     PTT:    Lab Results   Component Value Date    APTT 26.2 09/19/2020   [APTT    Current Medications  Current Facility-Administered Medications: vitamin B-12 (CYANOCOBALAMIN) tablet 500 mcg, 500 mcg, Oral, Daily  cefTRIAXone (ROCEPHIN) 1 g in sterile water 10 mL IV syringe, 1 g, Intravenous, Q24H  ipratropium-albuterol (DUONEB) nebulizer solution 1 ampule, 1 ampule, Inhalation, Q4H PRN  docusate sodium (COLACE) capsule 100 mg, 100 mg, Oral, BID  bisacodyl (DULCOLAX) suppository 10 mg, 10 mg, Rectal, Daily PRN  polyethylene glycol (GLYCOLAX) packet 17 g, 17 g, Oral, Daily  potassium chloride (KLOR-CON M) extended release tablet 40 mEq, 40 mEq, Oral, BID WC  amLODIPine (NORVASC) tablet 10 mg, 10 mg, Oral, Daily  metoprolol tartrate (LOPRESSOR) tablet 25 mg, 25 mg, Oral, BID  tiotropium (SPIRIVA RESPIMAT) 2.5 MCG/ACT inhaler 2 puff, 2 puff, Inhalation, Daily  0.9 % sodium chloride infusion, , Intravenous, Continuous  morphine injection 4 mg, 4 mg, Intravenous, Q4H PRN  sodium chloride flush 0.9 % injection 10 mL, 10 mL, Intravenous, 2 times per day  sodium chloride flush 0.9 % injection 10 mL, 10 mL, Intravenous, PRN  acetaminophen (TYLENOL) tablet 650 mg, 650 mg, Oral, Q6H PRN **OR** acetaminophen (TYLENOL) suppository 650 mg, 650 mg, Rectal, Q6H PRN  promethazine (PHENERGAN) tablet 12.5 mg, 12.5 mg, Oral, Q6H PRN **OR** ondansetron (ZOFRAN) injection 4 mg, 4 mg, Intravenous, Q6H PRN  metronidazole (FLAGYL) 500 mg in NaCl 100 mL IVPB premix, 500 mg, Intravenous, Q8H  hydrALAZINE (APRESOLINE) injection 5 mg, 5 mg, Intravenous, Q4H PRN  budesonide-formoterol (SYMBICORT) 160-4.5 MCG/ACT inhaler 2 puff, 2 puff, Inhalation, BID    ASSESSMENT AND PLAN    Acute cholecystitis:    -Surgery is following  -Conservative management    Thrombocytopenia    -Platelets of 51 today.    - B12 is borderline low.   We will start oral B12 supplements    Mild anemia:    -We will check iron studies      Danna Plata MD

## 2020-09-22 NOTE — PROGRESS NOTES
[facility]   Progress Note      9/22/2020 8:57 AM        Name: Valentino Ghee . Admitted: 9/18/2020    SUBJECTIVE:  Doing okay. No external bleeding. Reviewed interval ancillary notes    Current Medications  vitamin B-12 (CYANOCOBALAMIN) tablet 500 mcg, Daily  cefTRIAXone (ROCEPHIN) 1 g in sterile water 10 mL IV syringe, Q24H  ipratropium-albuterol (DUONEB) nebulizer solution 1 ampule, Q4H PRN  docusate sodium (COLACE) capsule 100 mg, BID  bisacodyl (DULCOLAX) suppository 10 mg, Daily PRN  polyethylene glycol (GLYCOLAX) packet 17 g, Daily  potassium chloride (KLOR-CON M) extended release tablet 40 mEq, BID WC  amLODIPine (NORVASC) tablet 10 mg, Daily  metoprolol tartrate (LOPRESSOR) tablet 25 mg, BID  tiotropium (SPIRIVA RESPIMAT) 2.5 MCG/ACT inhaler 2 puff, Daily  0.9 % sodium chloride infusion, Continuous  morphine injection 4 mg, Q4H PRN  sodium chloride flush 0.9 % injection 10 mL, 2 times per day  sodium chloride flush 0.9 % injection 10 mL, PRN  acetaminophen (TYLENOL) tablet 650 mg, Q6H PRN    Or  acetaminophen (TYLENOL) suppository 650 mg, Q6H PRN  promethazine (PHENERGAN) tablet 12.5 mg, Q6H PRN    Or  ondansetron (ZOFRAN) injection 4 mg, Q6H PRN  metronidazole (FLAGYL) 500 mg in NaCl 100 mL IVPB premix, Q8H  hydrALAZINE (APRESOLINE) injection 5 mg, Q4H PRN  budesonide-formoterol (SYMBICORT) 160-4.5 MCG/ACT inhaler 2 puff, BID        Objective:  BP (!) 155/71   Pulse 77   Temp 97.7 °F (36.5 °C) (Oral)   Resp 15   Ht 4' 9\" (1.448 m)   Wt 190 lb (86.2 kg)   SpO2 94%   BMI 41.12 kg/m²     Intake/Output Summary (Last 24 hours) at 9/22/2020 0806  Last data filed at 9/22/2020 0451  Gross per 24 hour   Intake 301.76 ml   Output --   Net 301.76 ml      Wt Readings from Last 3 Encounters:   09/18/20 190 lb (86.2 kg)   05/15/17 197 lb (89.4 kg)   10/14/16 201 lb 12.8 oz (91.5 kg)       Conscious alert oriented. Appears comfortable. No neck fullness.   Respiratory efforts are normal.  Abdomen is not distended. No leg edema  No focal deficits      Labs and Tests:  CBC:   Recent Labs     09/20/20  0711 09/21/20  0610 09/22/20  0635   WBC 6.4 5.7 6.7   HGB 10.4* 10.6* 11.9*   PLT 43* 51* 65*     BMP:    Recent Labs     09/20/20  0711 09/21/20  0610 09/22/20  0635    139 141   K 4.4 4.3 4.5   CL 99 102 101   CO2 32 31 33*   BUN 13 11 8   CREATININE 0.9 0.8 0.8   GLUCOSE 121* 141* 109*     Hepatic:   Recent Labs     09/20/20  0711 09/21/20  0610 09/22/20  0635   AST 14* 16 18   ALT 8* 10 10   BILITOT 0.3 0.3 0.3   ALKPHOS 96 98 105       ASSESSMENT AND PLAN    Active Problems:    Cholecystitis  Resolved Problems:    * No resolved hospital problems. *      Acute cholecystitis:  -Surgery is following  -Conservative management       Thrombocytopenia  -Platelets of 65 today.    - B12 is borderline low. Continue oral B12 supplements       Mild anemia:  -Iron studies consistent with anemia of chronic disease.   -Hgb improved to 11.9 today.          Varun Hernandez, [address] [facility]  [facility], [address] [facility].  [phone]

## 2020-09-22 NOTE — PROGRESS NOTES
[facility]        Progress Note    Pt Name: Britt Guillen  MRN: [id number]  YOB: 1940  Date of evaluation: 2020    Chief Complaint: Abdominal pain      Subjective:    No acute events overnight  Feeling a lot better, no further abdominal pain  Tolerating full liquid diet without complaints of nausea or vomiting  Passing stool  Resting in bed at this time      Vital Signs:  Patient Vitals for the past 24 hrs:   BP Temp Temp src Pulse Resp SpO2   20 1146 118/68 97.8 °F (36.6 °C) Temporal 68 16 97 %   20 0451 (!) 155/71 97.7 °F (36.5 °C) Oral 77 15 94 %   20 0141 (!) 148/58 98 °F (36.7 °C) Oral 80 14 95 %   20 2214 (!) 157/71 98.1 °F (36.7 °C) Oral 88 14 94 %   20 2105 -- -- -- -- -- 95 %      TEMPERATURE HISTORY 24H: Temp (24hrs), Av.9 °F (36.6 °C), Min:97.7 °F (36.5 °C), Max:98.1 °F (36.7 °C)    BLOOD PRESSURE HISTORY: Systolic (48PSY), VAP:008 , Min:118 , KLR:734    Diastolic (57FKJ), GOK:61, Min:58, Max:72      Intake/Output:    I/O last 3 completed shifts: In: 301.8 [I.V.:301.8]  Out: -   No intake/output data recorded. Drain/tube Output:           Physical Exam:  General: awake, alert, oriented to  person, place, time  Cardiac: regular rate and rhythm   Pulmonary: clear to auscultation bilaterally   Abdomen: soft, non-distended, nontender, active bowel sounds    Labs:  CBC:    Recent Labs     20  0711 20  0610 20  0635   WBC 6.4 5.7 6.7   HGB 10.4* 10.6* 11.9*   HCT 32.0* 31.8* 36.1   PLT 43* 51* 65*     BMP:    Recent Labs     20  0711 20  0610 20  0635    139 141   K 4.4 4.3 4.5   CL 99 102 101   CO2 32 31 33*   BUN 13 11 8   CREATININE 0.9 0.8 0.8   GLUCOSE 121* 141* 109*     Hepatic:   Recent Labs     20  0711 20  0610 20  0635   AST 14* 16 18   ALT 8* 10 10   BILITOT 0.3 0.3 0.3   ALKPHOS 96 98 105     Amylase: No results for input(s):  AMYLASE in the Pulmonary toilet, incentive spirometry  7. PRN analgesics and antiemetics--minimizing narcotics as tolerated, transition to PO  8. Management of medical comorbid etiologies per primary team and consulting services  9. Disposition: If no further pain and tolerating diet, anticipate discharge home in the next 24-48 hours    EDUCATION:  Educated patient on plan of care and disease process--all questions answered. Plans discussed with patient and nursing. Reviewed and discussed with Dr. Joselin Palmer. Signed:  Odilia Daugherty, SHAN - CNP  9/22/2020 2:10 PM    I have personally performed a face to face diagnostic evaluation on this patient. I have interviewed and examined the patient and I agree with the assessment above. In summary, my findings and plan are the following:   Ms. Darwin Rivero is a 79 y.o. female who presents with   Cholecystitis  UTI  Thrombocytopenia  COPD on home oxygen  History of colon cancer     Plan:  1. Nausea better, advance to low fat diet  2. Bowel function normalizing, passing stool  3. Antibiotics to cover both UTI and cholecystitis  4. Monitor thrombocytopenia, hematology input and workup noted  5. If continues to improve, will likely discharge tomorrow and follow up in office to discuss elective cholecystectomy. If does not tolerate diet advancement, will need cholecystectomy this admission  6. Defer management of remainder of management of remainder of medical comorbidities to primary and consulting teams    Joseph Palmer MD, FACS  9/22/2020  4:15 PM

## 2020-09-22 NOTE — PROGRESS NOTES
Incentive Spirometry education and demonstration completed by Respiratory Therapy Yes      Response to education: Excellent     Teaching Time: 5 minutes    Minimum Predicted Vital Capacity - 470 mL. Patient's Actual Vital Capacity - 750 mL. Turning over to Nursing for routine follow-up Yes.     Comments:       Electronically signed by Kimberly Mishra RCP on 9/21/2020 at 10:03 PM

## 2020-09-22 NOTE — PROGRESS NOTES
Incentive Spirometry education and demonstration completed by Respiratory Therapy Yes      Response to education: Excellent     Teaching Time: 5   minutes    Minimum Predicted Vital Capacity - 470 mL. Patient's Actual Vital Capacity - 750 mL. Turning over to Nursing for routine follow-up Yes.     Comments: none    Electronically signed by Annalisa Cortes RCP on 9/21/2020 at 9:16 PM

## 2020-09-22 NOTE — PROGRESS NOTES
Incentive Spirometry education and demonstration completed by Respiratory Therapy Yes      Response to education: Excellent        Teaching Time: 5 minutes    Minimum Predicted Vital Capacity - 470 mL. Patient's Actual Vital Capacity - 750 mL. Turning over to Nursing for routine follow-up Yes.     Comments:       Electronically signed by Rufino Johnson RCP on 9/21/2020 at 9:12 PM

## 2020-09-23 ENCOUNTER — APPOINTMENT (OUTPATIENT)
Dept: GENERAL RADIOLOGY | Age: 80
DRG: 445 | End: 2020-09-23
Payer: MEDICARE

## 2020-09-23 VITALS
RESPIRATION RATE: 16 BRPM | DIASTOLIC BLOOD PRESSURE: 62 MMHG | OXYGEN SATURATION: 96 % | SYSTOLIC BLOOD PRESSURE: 148 MMHG | TEMPERATURE: 97.9 F | BODY MASS INDEX: 40.99 KG/M2 | HEART RATE: 77 BPM | WEIGHT: 190 LBS | HEIGHT: 57 IN

## 2020-09-23 LAB
A/G RATIO: 1.3 (ref 1.1–2.2)
ALBUMIN SERPL-MCNC: 3.6 G/DL (ref 3.4–5)
ALP BLD-CCNC: 94 U/L (ref 40–129)
ALT SERPL-CCNC: 9 U/L (ref 10–40)
ANION GAP SERPL CALCULATED.3IONS-SCNC: 9 MMOL/L (ref 3–16)
AST SERPL-CCNC: 14 U/L (ref 15–37)
BASOPHILS ABSOLUTE: 0 K/UL (ref 0–0.2)
BASOPHILS RELATIVE PERCENT: 0.8 %
BILIRUB SERPL-MCNC: <0.2 MG/DL (ref 0–1)
BUN BLDV-MCNC: 8 MG/DL (ref 7–20)
CALCIUM SERPL-MCNC: 9.2 MG/DL (ref 8.3–10.6)
CHLORIDE BLD-SCNC: 104 MMOL/L (ref 99–110)
CO2: 31 MMOL/L (ref 21–32)
CREAT SERPL-MCNC: 0.8 MG/DL (ref 0.6–1.2)
CULTURE, BLOOD 2: NORMAL
EOSINOPHILS ABSOLUTE: 0.1 K/UL (ref 0–0.6)
EOSINOPHILS RELATIVE PERCENT: 2.5 %
GFR AFRICAN AMERICAN: >60
GFR NON-AFRICAN AMERICAN: >60
GLOBULIN: 2.8 G/DL
GLUCOSE BLD-MCNC: 102 MG/DL (ref 70–99)
HCT VFR BLD CALC: 33.5 % (ref 36–48)
HEMOGLOBIN: 11.1 G/DL (ref 12–16)
LYMPHOCYTES ABSOLUTE: 0.9 K/UL (ref 1–5.1)
LYMPHOCYTES RELATIVE PERCENT: 19.4 %
MCH RBC QN AUTO: 28.8 PG (ref 26–34)
MCHC RBC AUTO-ENTMCNC: 33.1 G/DL (ref 31–36)
MCV RBC AUTO: 87 FL (ref 80–100)
MONOCYTES ABSOLUTE: 0.4 K/UL (ref 0–1.3)
MONOCYTES RELATIVE PERCENT: 9 %
NEUTROPHILS ABSOLUTE: 3.1 K/UL (ref 1.7–7.7)
NEUTROPHILS RELATIVE PERCENT: 68.3 %
PDW BLD-RTO: 14.9 % (ref 12.4–15.4)
PLATELET # BLD: 56 K/UL (ref 135–450)
PMV BLD AUTO: 12.7 FL (ref 5–10.5)
POTASSIUM SERPL-SCNC: 4.6 MMOL/L (ref 3.5–5.1)
RBC # BLD: 3.85 M/UL (ref 4–5.2)
SODIUM BLD-SCNC: 144 MMOL/L (ref 136–145)
TOTAL PROTEIN: 6.4 G/DL (ref 6.4–8.2)
WBC # BLD: 4.6 K/UL (ref 4–11)

## 2020-09-23 PROCEDURE — 94761 N-INVAS EAR/PLS OXIMETRY MLT: CPT

## 2020-09-23 PROCEDURE — 6370000000 HC RX 637 (ALT 250 FOR IP): Performed by: FAMILY MEDICINE

## 2020-09-23 PROCEDURE — APPSS15 APP SPLIT SHARED TIME 0-15 MINUTES: Performed by: NURSE PRACTITIONER

## 2020-09-23 PROCEDURE — APPNB30 APP NON BILLABLE TIME 0-30 MINS: Performed by: NURSE PRACTITIONER

## 2020-09-23 PROCEDURE — 74018 RADEX ABDOMEN 1 VIEW: CPT

## 2020-09-23 PROCEDURE — 6370000000 HC RX 637 (ALT 250 FOR IP): Performed by: INTERNAL MEDICINE

## 2020-09-23 PROCEDURE — 6360000002 HC RX W HCPCS: Performed by: INTERNAL MEDICINE

## 2020-09-23 PROCEDURE — 85025 COMPLETE CBC W/AUTO DIFF WBC: CPT

## 2020-09-23 PROCEDURE — 6370000000 HC RX 637 (ALT 250 FOR IP): Performed by: SURGERY

## 2020-09-23 PROCEDURE — 2700000000 HC OXYGEN THERAPY PER DAY

## 2020-09-23 PROCEDURE — 94640 AIRWAY INHALATION TREATMENT: CPT

## 2020-09-23 PROCEDURE — 2500000003 HC RX 250 WO HCPCS: Performed by: SURGERY

## 2020-09-23 PROCEDURE — 99232 SBSQ HOSP IP/OBS MODERATE 35: CPT | Performed by: SURGERY

## 2020-09-23 PROCEDURE — 6370000000 HC RX 637 (ALT 250 FOR IP): Performed by: NURSE PRACTITIONER

## 2020-09-23 PROCEDURE — 80053 COMPREHEN METABOLIC PANEL: CPT

## 2020-09-23 RX ORDER — AMOXICILLIN AND CLAVULANATE POTASSIUM 500; 125 MG/1; MG/1
1 TABLET, FILM COATED ORAL 2 TIMES DAILY
Qty: 20 TABLET | Refills: 0 | Status: SHIPPED | OUTPATIENT
Start: 2020-09-23 | End: 2020-10-03

## 2020-09-23 RX ORDER — METRONIDAZOLE 500 MG/1
500 TABLET ORAL 3 TIMES DAILY
Qty: 30 TABLET | Refills: 0 | Status: SHIPPED | OUTPATIENT
Start: 2020-09-23 | End: 2020-10-03

## 2020-09-23 RX ADMIN — TIOTROPIUM BROMIDE INHALATION SPRAY 2 PUFF: 3.12 SPRAY, METERED RESPIRATORY (INHALATION) at 09:39

## 2020-09-23 RX ADMIN — CYANOCOBALAMIN TAB 1000 MCG 500 MCG: 1000 TAB at 08:15

## 2020-09-23 RX ADMIN — Medication 1 G: at 13:24

## 2020-09-23 RX ADMIN — POTASSIUM CHLORIDE 40 MEQ: 1500 TABLET, EXTENDED RELEASE ORAL at 08:15

## 2020-09-23 RX ADMIN — AMLODIPINE BESYLATE 10 MG: 5 TABLET ORAL at 08:14

## 2020-09-23 RX ADMIN — ACETAMINOPHEN 650 MG: 325 TABLET ORAL at 13:35

## 2020-09-23 RX ADMIN — METRONIDAZOLE 500 MG: 500 INJECTION, SOLUTION INTRAVENOUS at 07:23

## 2020-09-23 RX ADMIN — METOPROLOL TARTRATE 25 MG: 25 TABLET, FILM COATED ORAL at 08:15

## 2020-09-23 RX ADMIN — POLYETHYLENE GLYCOL 3350 17 G: 17 POWDER, FOR SOLUTION ORAL at 08:14

## 2020-09-23 RX ADMIN — DOCUSATE SODIUM 100 MG: 100 CAPSULE, LIQUID FILLED ORAL at 08:15

## 2020-09-23 RX ADMIN — Medication 2 PUFF: at 09:39

## 2020-09-23 ASSESSMENT — PAIN SCALES - GENERAL
PAINLEVEL_OUTOF10: 0
PAINLEVEL_OUTOF10: 0
PAINLEVEL_OUTOF10: 3

## 2020-09-23 NOTE — PROGRESS NOTES
[facility]        Progress Note    Pt Name: Skyler Brown  MRN: [id number]  YOB: 1940  Date of evaluation: 2020    Chief Complaint: Abdominal pain      Subjective:    No acute events overnight   Denies further abdominal pain  Tolerating full liquid diet without complaints of nausea or vomiting--did not try low fat diet yet  Passing stool but does admit to mild bloating  Resting in bed at this time      Vital Signs:  Patient Vitals for the past 24 hrs:   BP Temp Temp src Pulse Resp SpO2   20 0943 -- -- -- -- -- 94 %   20 0814 (!) 169/74 98 °F (36.7 °C) Oral 74 18 97 %   20 0029 (!) 150/88 97.9 °F (36.6 °C) Oral 74 18 92 %   20 2041 (!) 152/76 98.6 °F (37 °C) Oral 74 16 95 %   20 1935 -- -- -- -- 16 96 %   20 1610 (!) 149/60 98.3 °F (36.8 °C) Oral 76 16 96 %   20 1146 118/68 97.8 °F (36.6 °C) Temporal 68 16 97 %      TEMPERATURE HISTORY 24H: Temp (24hrs), Av.1 °F (36.7 °C), Min:97.8 °F (36.6 °C), Max:98.6 °F (37 °C)    BLOOD PRESSURE HISTORY: Systolic (40GNS), ZIM:261 , Min:118 , MJV:831    Diastolic (09YJQ), NQS:11, Min:58, Max:88      Intake/Output:    I/O last 3 completed shifts:   In: 1080 [P.O.:860; I.V.:220]  Out: -   I/O this shift:  In: 240 [P.O.:240]  Out: -   Drain/tube Output:         Physical Exam:  General: awake, alert, oriented to  person, place, time  Cardiac: regular rate and rhythm   Pulmonary: clear to auscultation bilaterally   Abdomen: soft, non-distended, nontender, active bowel sounds    Labs:  CBC:    Recent Labs     20  0610 20  0635 20  0615   WBC 5.7 6.7 4.6   HGB 10.6* 11.9* 11.1*   HCT 31.8* 36.1 33.5*   PLT 51* 65* 56*     BMP:    Recent Labs     20  0610 20  0635 20  0615    141 144   K 4.3 4.5 4.6    101 104   CO2 31 33* 31   BUN 11 8 8   CREATININE 0.8 0.8 0.8   GLUCOSE 141* 109* 102*     Hepatic:   Recent Labs     20  0610 cancer      Plan:  1. Pain resolved, anticipate proceeding with surgery electively  2. Advance low fat diet as tolerated--recommended that patient try solids to see if she will tolerate; monitor bowel function--passing stool but mildly distended, AXR pending  3. Monitor and correct electrolytes  4. Antibiotics  5. Activity as tolerated, ambulate TID, up to chair for all meals  6. Pulmonary toilet, incentive spirometry  7. PRN analgesics and antiemetics--minimizing narcotics as tolerated, transition to PO  8. Management of medical comorbid etiologies per primary team and consulting services  9. Disposition: Anticipate discharge home soon    EDUCATION:  Educated patient on plan of care and disease process--all questions answered. Plans discussed with patient and nursing. Reviewed and discussed with Dr. Elie Mcarthur. Signed:  SHAN Da Silva - CNP  9/23/2020 11:08 AM     I have personally performed a face to face diagnostic evaluation on this patient. I have interviewed and examined the patient and I agree with the assessment above. In summary, my findings and plan are the following:   Ms. Jacy Syed is a 79 y.o. female who presents with   Cholecystitis  UTI  Thrombocytopenia  COPD on home oxygen  History of colon cancer     Plan:  1. Tolerating low fat diet  2. Bowel function normalizing, passing stool  3. Antibiotics to cover both UTI and cholecystitis  4. Monitor thrombocytopenia, hematology input and workup noted  5. Outcomes will be best with elective procedure. As patient continues to improve and currently pain free, recommend elective followup and procedure. If decompensates, will need cholecystectomy this admission  6. Defer management of remainder of management of remainder of medical comorbidities to primary and consulting teams  7. Discharge planning, may discharge from surgical standpoint but because of transportation issues may be delayed until tomorrow    Joseph Mcarthur MD, FACS  9/23/2020  3:07 PM

## 2020-09-23 NOTE — PROGRESS NOTES
[facility]ology [facility]   Progress Note      9/23/2020 8:57 AM        Name: Faraz Woo . Admitted: 9/18/2020    SUBJECTIVE:  Doing okay. No external bleeding. No pain. Reviewed interval ancillary notes    Current Medications  vitamin B-12 (CYANOCOBALAMIN) tablet 500 mcg, Daily  cefTRIAXone (ROCEPHIN) 1 g in sterile water 10 mL IV syringe, Q24H  ipratropium-albuterol (DUONEB) nebulizer solution 1 ampule, Q4H PRN  docusate sodium (COLACE) capsule 100 mg, BID  bisacodyl (DULCOLAX) suppository 10 mg, Daily PRN  polyethylene glycol (GLYCOLAX) packet 17 g, Daily  potassium chloride (KLOR-CON M) extended release tablet 40 mEq, BID WC  amLODIPine (NORVASC) tablet 10 mg, Daily  metoprolol tartrate (LOPRESSOR) tablet 25 mg, BID  tiotropium (SPIRIVA RESPIMAT) 2.5 MCG/ACT inhaler 2 puff, Daily  morphine injection 4 mg, Q4H PRN  sodium chloride flush 0.9 % injection 10 mL, 2 times per day  sodium chloride flush 0.9 % injection 10 mL, PRN  acetaminophen (TYLENOL) tablet 650 mg, Q6H PRN    Or  acetaminophen (TYLENOL) suppository 650 mg, Q6H PRN  promethazine (PHENERGAN) tablet 12.5 mg, Q6H PRN    Or  ondansetron (ZOFRAN) injection 4 mg, Q6H PRN  metronidazole (FLAGYL) 500 mg in NaCl 100 mL IVPB premix, Q8H  hydrALAZINE (APRESOLINE) injection 5 mg, Q4H PRN  budesonide-formoterol (SYMBICORT) 160-4.5 MCG/ACT inhaler 2 puff, BID        Objective:  BP (!) 169/74   Pulse 74   Temp 98 °F (36.7 °C) (Oral)   Resp 18   Ht 4' 9\" (1.448 m)   Wt 190 lb (86.2 kg)   SpO2 97%   BMI 41.12 kg/m²     Intake/Output Summary (Last 24 hours) at 9/23/2020 0857  Last data filed at 9/23/2020 0814  Gross per 24 hour   Intake 1320 ml   Output --   Net 1320 ml      Wt Readings from Last 3 Encounters:   09/18/20 190 lb (86.2 kg)   05/15/17 197 lb (89.4 kg)   10/14/16 201 lb 12.8 oz (91.5 kg)       Conscious alert oriented.    Appears comfortable. No neck fullness.   Respiratory efforts are normal.  Abdomen is not distended. No leg edema  No focal deficits    Labs and Tests:  CBC:   Recent Labs     09/21/20  0610 09/22/20  0635 09/23/20  0615   WBC 5.7 6.7 4.6   HGB 10.6* 11.9* 11.1*   PLT 51* 65* 56*     BMP:    Recent Labs     09/21/20  0610 09/22/20  0635 09/23/20  0615    141 144   K 4.3 4.5 4.6    101 104   CO2 31 33* 31   BUN 11 8 8   CREATININE 0.8 0.8 0.8   GLUCOSE 141* 109* 102*     Hepatic:   Recent Labs     09/21/20  0610 09/22/20  0635 09/23/20  0615   AST 16 18 14*   ALT 10 10 9*   BILITOT 0.3 0.3 <0.2   ALKPHOS 98 105 94       ASSESSMENT AND PLAN    Principal Problem:    Acute cholecystitis  Active Problems:    HTN (hypertension)    Chronic respiratory failure with hypoxia (HCC)    Thrombocytopenia (HCC)    Morbid obesity with BMI of 40.0-44.9, adult (HCC)    COPD (chronic obstructive pulmonary disease) (HCC)    Anemia  Resolved Problems:    * No resolved hospital problems. *      Acute cholecystitis:  -Surgery is following  -Conservative management        Thrombocytopenia  -Platelets of 40S today.    - B12 is borderline low. Continue oral B12 supplements        Mild anemia:  -Iron studies consistent with anemia of chronic disease.   -Hgb improved to 11.1 today.        Okay for d/c from heme/onc standpoint. Will need follow up with Dr. Elizabeth Connor after discharge. Hanna Simmons, [id number] [facility]  Oncology Hematology [facility], [facility].  [phone]    Patient seen and examined. Agree with above. Platelet count is 56. Continue to monitor.     Jani Little MD

## 2020-09-23 NOTE — PROGRESS NOTES
Morning med pass and assessment completed, bowel sounds were active, pt states she had a small formed BM. Denies pain or nausea, passing flatus. Care plan and education were reviewed with patient and mutually agreed upon. Reviewed potential for falls and safety measures. Patient verbalized understanding and denies any need at this time. Will continue to monitor.

## 2020-09-23 NOTE — PROGRESS NOTES
CLINICAL PHARMACY NOTE: MEDS TO [address] Select Patient?: No  Total # of Prescriptions Filled: 2   The following medications were delivered to the patient:  · augmentin 500  · Metronidazole 500  Total # of Interventions Completed: 0  Time Spent (min): 45    Additional Documentation:  Delivered 2 scripts to patients bedside

## 2020-09-23 NOTE — PROGRESS NOTES
[facility]    9/23/2020 10:17 AM        Name: Nalini Hendricks . Admitted: 9/18/2020  Primary Care Provider: Garret Ansari (Tel: None)    Brief Course:  78 yo F with COPD, chronic respiratory failure with hypoxia, HTN, morbid obesity came to ER with abdominal pain. Admitted as inpatient for acute cholecystitis with acute thrombocytopenia. Followed by Surgery and Oncology. HIT negative. Thrombocytopenia due to infection. Started on IV Abx. CC:  Abdominal pain    Subjective:  . Patient with less RUQ pain and no nausea, but bloated. Does not feel safe to go home. Very worried she will come back to hospital.  No CP, SOB, HA or fevers. No diarrhea.     Reviewed interval ancillary notes    Current Medications  vitamin B-12 (CYANOCOBALAMIN) tablet 500 mcg, Daily  cefTRIAXone (ROCEPHIN) 1 g in sterile water 10 mL IV syringe, Q24H  ipratropium-albuterol (DUONEB) nebulizer solution 1 ampule, Q4H PRN  docusate sodium (COLACE) capsule 100 mg, BID  bisacodyl (DULCOLAX) suppository 10 mg, Daily PRN  polyethylene glycol (GLYCOLAX) packet 17 g, Daily  potassium chloride (KLOR-CON M) extended release tablet 40 mEq, BID WC  amLODIPine (NORVASC) tablet 10 mg, Daily  metoprolol tartrate (LOPRESSOR) tablet 25 mg, BID  tiotropium (SPIRIVA RESPIMAT) 2.5 MCG/ACT inhaler 2 puff, Daily  morphine injection 4 mg, Q4H PRN  sodium chloride flush 0.9 % injection 10 mL, 2 times per day  sodium chloride flush 0.9 % injection 10 mL, PRN  acetaminophen (TYLENOL) tablet 650 mg, Q6H PRN    Or  acetaminophen (TYLENOL) suppository 650 mg, Q6H PRN  promethazine (PHENERGAN) tablet 12.5 mg, Q6H PRN    Or  ondansetron (ZOFRAN) injection 4 mg, Q6H PRN  metronidazole (FLAGYL) 500 mg in NaCl 100 mL IVPB premix, Q8H  hydrALAZINE (APRESOLINE) injection 5 mg, Q4H PRN  budesonide-formoterol (SYMBICORT) 160-4.5 MCG/ACT inhaler 2 puff, BID        Objective:  BP (!) 169/74   Pulse 74   Temp 98 °F (36.7 °C) (Oral)   Resp 18   Ht 4' 9\" (1.448 m)   Wt 190 lb (86.2 kg)   SpO2 94%   BMI 41.12 kg/m²     Intake/Output Summary (Last 24 hours) at 9/23/2020 1017  Last data filed at 9/23/2020 0814  Gross per 24 hour   Intake 1320 ml   Output --   Net 1320 ml      Wt Readings from Last 3 Encounters:   09/18/20 190 lb (86.2 kg)   05/15/17 197 lb (89.4 kg)   10/14/16 201 lb 12.8 oz (91.5 kg)       General appearance:  Appears comfortable  Eyes: Sclera clear. Pupils equal.  ENT: Moist oral mucosa. Trachea midline, no adenopathy. Cardiovascular: Regular rhythm, normal S1, S2. No murmur. No edema in lower extremities  Respiratory: Not using accessory muscles. Good inspiratory effort. Clear to auscultation bilaterally, no wheeze or crackles. GI: Abdomen soft, less RUQ tenderness, obese, slightly distended, normal bowel sounds  Musculoskeletal: No cyanosis in digits, neck supple  Neurology: Grossly intact. No speech or motor deficits  Psych: Normal affect. Alert and oriented in time, place and person  Skin: Warm, dry, normal turgor  Extremity exam shows brisk capillary refill. Peripheral pulses are palpable in lower extremities     Labs and Tests:  CBC:   Recent Labs     09/21/20  0610 09/22/20  0635 09/23/20  0615   WBC 5.7 6.7 4.6   HGB 10.6* 11.9* 11.1*   PLT 51* 65* 56*     BMP:    Recent Labs     09/21/20  0610 09/22/20  0635 09/23/20  0615    141 144   K 4.3 4.5 4.6    101 104   CO2 31 33* 31   BUN 11 8 8   CREATININE 0.8 0.8 0.8   GLUCOSE 141* 109* 102*     Hepatic:   Recent Labs     09/21/20  0610 09/22/20  0635 09/23/20  0615   AST 16 18 14*   ALT 10 10 9*   BILITOT 0.3 0.3 <0.2   ALKPHOS 98 105 94     US RENAL COMPLETE   Final Result   1. Small kidneys with cortical thinning suggests chronic medical renal   disease. 2. No ultrasound evidence of cyst to correlate to the prior CT.   Typical   follow-up of a complex cystic lesion of the kidney would be for the patient   to undergo an MRI of the abdomen with and without contrast using a dedicated   renal protocol. US GALLBLADDER RUQ   Final Result   Acute cholecystitis. No choledocholithiasis or duct dilatation. CT ABDOMEN PELVIS W IV CONTRAST Additional Contrast? None   Final Result   Cholelithiasis and findings of mild acute cholecystitis. Indeterminate 13 mm cystic appearing lesion in the posterior upper left   kidney. Lesion not seen on CT chest 05/23/2017. No other significant   abnormality. RECOMMENDATIONS:   Non urgent renal ultrasound suggested for further evaluation the left renal   lesion. XR ABDOMEN (KUB) (SINGLE AP VIEW)    (Results Pending)         Problem List  Principal Problem:    Acute cholecystitis  Active Problems:    HTN (hypertension)    Chronic respiratory failure with hypoxia (HCC)    Thrombocytopenia (HCC)    Morbid obesity with BMI of 40.0-44.9, adult (HCC)    COPD (chronic obstructive pulmonary disease) (HCC)    Anemia  Resolved Problems:    * No resolved hospital problems. *       Assessment & Plan:   1. Low fat diet per Surgery  2. Cont Flagyl and Rocephin IV for acute cholecystitis  3. Will be high risk outpatient/elective surgery; might be best with IR or OR here with platelet transfusion if ok with Oncology  4. Cont O2 via NC  5. Cont Norvasc and Lopressor  6. Cont Spiriva and Symbicort  7. PT/OT eval for dispo    IV Access: Peripheral  Newman: No  Diet: DIET LOW FAT;  Code:Full Code  DVT PPX SCD  Disposition Home    Discussed with patient, CM and nursing. She is HIGH risk for readmit. I think she is best with intervention (OR vs IR) here if ok with Oncology for thrombocytopenia issues.     Jaxson Hernandez MD   9/23/2020 10:17 AM

## 2020-09-23 NOTE — PROGRESS NOTES
PM assessment completed, see flow sheet. Pt resting well in bed, denies pain or discomfort. Pt declined colace this evening, but agreeable to take in the morning. No needs voiced.  Fall precautions in place, hourly rounding, call light and belongings in reach, bed in lowest position, wheels locked in place, side rails up x 2, walkways free of clutter

## 2020-09-23 NOTE — PROGRESS NOTES
Pt cleared for d/c by all specialties, d/c order placed by hospitalist. Prescriptions delivered to bedside. Reviewed discharge instructions, medications, and follow up with pt. She expressed understanding and has no questions at this time. Pt states her daughter will be able to pick her up this evening. Pt discharging home in stable condition with all belonging and instructions to call general surgery office tomorrow morning to schedule follow up.

## 2020-09-24 NOTE — DISCHARGE SUMMARY
Hospital Medicine Discharge Summary    Patient: Britt Guillen     Gender: female  : 1940   Age: 79 y.o. MRN: [id number]    Admitting Physician: Vida Estrada MD  Discharge Physician: Sumit Goldman MD     Code Status: Full code    Admit Date: 2020   Discharge Date: 2020      Disposition:  Home    Discharge Diagnoses: Active Hospital Problems    Diagnosis Date Noted    Thrombocytopenia ([facility] [address] [D69.6] 2020    Morbid obesity with BMI of 40.0-44.9, adult ([facility] [address] [E66.01, Z68.41] 2020    COPD (chronic obstructive pulmonary disease) ([facility] [address] [J44.9] 2020    Anemia [D64.9] 2020    Acute cholecystitis [K81.0] 2020    Chronic respiratory failure with hypoxia [facility]) [J96.11] 05/15/2017    HTN (hypertension) [I10] 2016       Follow-up appointments:  one week    Outpatient to do list: F/U with PCP, Oncology and Surgery    Condition at Discharge:  [facility] [place] Course:   80 yo F with COPD, chronic respiratory failure with hypoxia, HTN, morbid obesity came to ER with abdominal pain. Admitted as inpatient for acute cholecystitis with acute thrombocytopenia. Followed by Surgery and Oncology. HIT negative. Thrombocytopenia due to infection. Started on IV Abx. Will finish course of PO Abx for acute cholecystitis. F/U with PCP, Surgery and Oncology.     Discharge Medications:   Discharge Medication List as of 2020  4:18 PM      START taking these medications    Details   metroNIDAZOLE (FLAGYL) 500 MG tablet Take 1 tablet by mouth 3 times daily for 10 days, Disp-30 tablet,R-0Normal      amoxicillin-clavulanate (AUGMENTIN) 500-125 MG per tablet Take 1 tablet by mouth 2 times daily for 10 days, Disp-20 tablet,R-0Normal           Discharge Medication List as of 2020  4:18 PM        Discharge Medication List as of 2020  4:18 PM      CONTINUE these medications which have NOT CHANGED    Details   fluticasone-salmeterol (ADVAIR DISKUS) 250-50 MCG/DOSE AEPB Inhale 1 puff into the lungs every 12 hoursHistorical Med      hydrochlorothiazide (HYDRODIURIL) 25 MG tablet Take 25 mg by mouth dailyHistorical Med      albuterol sulfate  (90 BASE) MCG/ACT inhaler Inhale 2 puffs into the lungs every 4 hours as needed for Wheezing or Shortness of Breath, Disp-1 Inhaler, R-11Normal      amLODIPine (NORVASC) 10 MG tablet TAKE 1 TABLET BY MOUTH DAILY, Disp-30 tablet, R-0Normal      SPIRIVA HANDIHALER 18 MCG inhalation capsule INHALE 1 CAPSULE INTO THE LUNGS DAILY USING THE HANDIHALER, Disp-30 capsule, R-0Normal      metoprolol tartrate (LOPRESSOR) 25 MG tablet Take 1 tablet by mouth 2 times daily, Disp-60 tablet, R-1      ketoconazole (NIZORAL) 2 % cream Apply topically daily Apply topically daily. , Topical, Historical Med           Discharge Medication List as of 9/23/2020  4:18 PM            Discharge Exam:    BP (!) 148/62   Pulse 77   Temp 97.9 °F (36.6 °C) (Oral)   Resp 16   Ht 4' 9\" (1.448 m)   Wt 190 lb (86.2 kg)   SpO2 96%   BMI 41.12 kg/m²   General appearance:  Appears comfortable  Eyes: Sclera clear. Pupils equal.  ENT: Moist oral mucosa. Trachea midline, no adenopathy. Cardiovascular: Regular rhythm, normal S1, S2. No murmur. No edema in lower extremities  Respiratory: Not using accessory muscles. Good inspiratory effort. Clear to auscultation bilaterally, no wheeze or crackles. GI: Abdomen soft, less RUQ tenderness, obese, slightly distended, normal bowel sounds  Musculoskeletal: No cyanosis in digits, neck supple  Neurology: Grossly intact. No speech or motor deficits  Psych: Normal affect. Alert and oriented in time, place and person  Skin: Warm, dry, normal turgor  Extremity exam shows brisk capillary refill. Peripheral pulses are palpable in lower extremities     Labs:  For convenience and continuity at follow-up the following most recent labs are provided:    Lab Results   Component Value Date    WBC 4.6 09/23/2020    HGB 11.1 09/23/2020    HCT 33.5 09/23/2020    MCV 87.0 09/23/2020    PLT 56 09/23/2020     09/23/2020    K 4.6 09/23/2020     09/23/2020    CO2 31 09/23/2020    BUN 8 09/23/2020    CREATININE 0.8 09/23/2020    CALCIUM 9.2 09/23/2020    PHOS 2.8 09/20/2020    BNP 66.1 05/28/2013    ALKPHOS 94 09/23/2020    ALT 9 09/23/2020    AST 14 09/23/2020    BILITOT <0.2 09/23/2020    BILIDIR <0.2 09/19/2020    LABALBU 3.6 09/23/2020    LDLCALC 155 05/29/2013    TRIG 78 05/29/2013     Lab Results   Component Value Date    INR 1.14 09/19/2020    INR 1.05 06/28/2015       Radiology:  Xr Abdomen (kub) (single Ap View)    Result Date: 9/23/2020  EXAMINATION: ONE SUPINE XRAY VIEW(S) OF THE ABDOMEN 9/23/2020 10:45 am COMPARISON: 02/16/2013 HISTORY: ORDERING SYSTEM PROVIDED HISTORY: Abdominal fullness TECHNOLOGIST PROVIDED HISTORY: Reason for exam:->Abdominal fullness Reason for Exam: Abdominal fullness Acuity: Unknown Type of Exam: Ongoing FINDINGS: Gas seen in stomach and nondilated colon. No significant small bowel gas. Unchanged right pelvic calcification most likely representing a phlebolith. No extraluminal gas. Unremarkable appearing abdomen     Us Renal Complete    Result Date: 9/19/2020  EXAMINATION: RETROPERITONEAL ULTRASOUND OF THE KIDNEYS AND URINARY BLADDER 9/19/2020 COMPARISON: 09/18/2020 CT HISTORY: ORDERING SYSTEM PROVIDED HISTORY: abnormal ct TECHNOLOGIST PROVIDED HISTORY: Reason for exam:->abnormal ct Complex cystic lesion at the upper pole of the left kidney. FINDINGS: Kidneys: The right kidney measures 8.7 x 5.0 x 5.1 cm in length and the left kidney measures 8.3 x 4.6 x 6.1 cm in length. The kidneys demonstrate cortical thinning but normal cortical echogenicity. There is no evidence of nephrolithiasis or hydronephrosis. No cystic or solid mass can be appreciated with ultrasound. Bladder: Unremarkable appearance of the bladder.   No significant post void residual.     1. Small kidneys with cortical thinning suggests chronic medical renal disease. 2. No ultrasound evidence of cyst to correlate to the prior CT. Typical follow-up of a complex cystic lesion of the kidney would be for the patient to undergo an MRI of the abdomen with and without contrast using a dedicated renal protocol. Ct Abdomen Pelvis W Iv Contrast Additional Contrast? None    Result Date: 9/18/2020  EXAMINATION: CT OF THE ABDOMEN AND PELVIS WITH CONTRAST 9/18/2020 11:13 am TECHNIQUE: CT of the abdomen and pelvis was performed with the administration of intravenous contrast. Multiplanar reformatted images are provided for review. Dose modulation, iterative reconstruction, and/or weight based adjustment of the mA/kV was utilized to reduce the radiation dose to as low as reasonably achievable. COMPARISON: None. HISTORY: ORDERING SYSTEM PROVIDED HISTORY: abd pain, distention, r/o SBO TECHNOLOGIST PROVIDED HISTORY: Reason for exam:->abd pain, distention, r/o SBO Additional Contrast?->None Reason for Exam: abd pain, distention, r/o SBO Acuity: Acute Type of Exam: Initial Former smoker with 30 pack year history. FINDINGS: Lower Chest: Inferior lung bases are clear. Heart size is normal. Organs: Liver is normal in appearance without worrisome focal lesion. Prominent gallstone in the neck of the gallbladder. Gallbladder wall is thickened and mild pericholecystic inflammatory stranding present. 13 mm low-density lesion posteriorly in the upper left kidney not clearly a simple cyst.  Very small cortical simple cyst posteriorly in the upper right kidney. Other abdominal organs appear normal. GI/Bowel: Prior right hemicolectomy with entero colonic anastomosis anterior to the mid liver. No bowel obstruction. Severe sigmoid diverticulosis without diverticulitis. Pelvis: Uterus remains and appears normal for age. Urinary bladder appears normal.  No free fluid or adenopathy. Peritoneum/Retroperitoneum: No mass, adenopathy, or ascites. Normal size of the aorta. No free air.

## 2020-10-02 ENCOUNTER — OFFICE VISIT (OUTPATIENT)
Dept: SURGERY | Age: 80
End: 2020-10-02
Payer: MEDICARE

## 2020-10-02 VITALS
SYSTOLIC BLOOD PRESSURE: 154 MMHG | BODY MASS INDEX: 38.95 KG/M2 | DIASTOLIC BLOOD PRESSURE: 70 MMHG | WEIGHT: 180 LBS | TEMPERATURE: 97.5 F

## 2020-10-02 PROCEDURE — 1036F TOBACCO NON-USER: CPT | Performed by: SURGERY

## 2020-10-02 PROCEDURE — 99213 OFFICE O/P EST LOW 20 MIN: CPT | Performed by: SURGERY

## 2020-10-02 PROCEDURE — 1111F DSCHRG MED/CURRENT MED MERGE: CPT | Performed by: SURGERY

## 2020-10-02 PROCEDURE — 1123F ACP DISCUSS/DSCN MKR DOCD: CPT | Performed by: SURGERY

## 2020-10-02 PROCEDURE — G8484 FLU IMMUNIZE NO ADMIN: HCPCS | Performed by: SURGERY

## 2020-10-02 PROCEDURE — G8427 DOCREV CUR MEDS BY ELIG CLIN: HCPCS | Performed by: SURGERY

## 2020-10-02 PROCEDURE — G8400 PT W/DXA NO RESULTS DOC: HCPCS | Performed by: SURGERY

## 2020-10-02 PROCEDURE — G8417 CALC BMI ABV UP PARAM F/U: HCPCS | Performed by: SURGERY

## 2020-10-02 PROCEDURE — 1090F PRES/ABSN URINE INCON ASSESS: CPT | Performed by: SURGERY

## 2020-10-02 PROCEDURE — 4040F PNEUMOC VAC/ADMIN/RCVD: CPT | Performed by: SURGERY

## 2020-10-02 NOTE — PROGRESS NOTES
[facility] 82 [facility]  SUBJECTIVE:    Chief Complaint: abdominal pain    Johnnye Reason   1940   79 y.o. female presents initially to [facility] with 3 day history of sharp right upper quadrant pain. Constant, progressively worse. Worse with eating and at the end of the day, nothing makes it better. Does not radiate and has never happened before. Associated with nausea, dry heaves, boating, anorexia, constipation, easy bruising. Denies fevers, chills, chest pain, dyspnea/cough other than baseline, jaundice, dysuria or other complaints. Workup in ED consistent with UTI, cholecystitis, thrombocytopenia. Medical history includes COPD on home oxygen. History of colon cancer s/p right hemicolectomy and not had colonoscopy in more than 10 years. She was treated conservatively with antibiotics for cholecystitis and is recovered well. Currently she is without any pain. She is adjusted her diet and not had any postprandial symptoms. She presents today for hospital follow-up    Past Medical History:   Diagnosis Date    Cataracts, bilateral     Cervical cancer screening 2000    Nml per pt'    COPD (chronic obstructive pulmonary disease) ([facility] [address]     Portable oxygen:2 L/min    Gall stones     H/O colonoscopy 1990    polyp    H/O mammogram 2013    Nml per pt'.     Hypertension 2010    Morbid obesity with BMI of 40.0-44.9, adult ([facility] [address] 9/22/2020     Past Surgical History:   Procedure Laterality Date    COLON SURGERY  1990    colon cancer:s/p surgery per pt'     Social History     Socioeconomic History    Marital status:      Spouse name: Not on file    Number of children: Not on file    Years of education: Not on file    Highest education level: Not on file   Occupational History    Occupation: Retired    Social Needs    Financial resource strain: Not on file    Food insecurity     Worry: Not on file     Inability: Not on file   [place] needs Medical: Not on file     Non-medical: Not on file   Tobacco Use    Smoking status: Former Smoker     Packs/day: 1.00     Years: 30.00     Pack years: 30.00     Types: Cigarettes     Last attempt to quit: 10/14/1995     Years since quittin.9    Smokeless tobacco: Never Used   Substance and Sexual Activity    Alcohol use: No    Drug use: No    Sexual activity: Never   Lifestyle    Physical activity     Days per week: Not on file     Minutes per session: Not on file    Stress: Not on file   Relationships    Social connections     Talks on phone: Not on file     Gets together: Not on file     Attends Yazidi service: Not on file     Active member of club or organization: Not on file     Attends meetings of clubs or organizations: Not on file     Relationship status: Not on file    Intimate partner violence     Fear of current or ex partner: Not on file     Emotionally abused: Not on file     Physically abused: Not on file     Forced sexual activity: Not on file   Other Topics Concern    Not on file   Social History Narrative    Not on file      Family History   Problem Relation Age of Onset    Heart Disease Father     No Known Problems Mother     Cancer Sister     Arrhythmia Brother     No Known Problems Maternal Grandmother     No Known Problems Maternal Grandfather     No Known Problems Paternal Grandmother     No Known Problems Paternal Grandfather     No Known Problems Other     Cancer Sister     Cancer Brother     Cancer Brother      Current Outpatient Medications   Medication Sig Dispense Refill    metroNIDAZOLE (FLAGYL) 500 MG tablet Take 1 tablet by mouth 3 times daily for 10 days 30 tablet 0    amoxicillin-clavulanate (AUGMENTIN) 500-125 MG per tablet Take 1 tablet by mouth 2 times daily for 10 days 20 tablet 0    fluticasone-salmeterol (ADVAIR DISKUS) 250-50 MCG/DOSE AEPB Inhale 1 puff into the lungs every 12 hours      hydrochlorothiazide (HYDRODIURIL) 25 MG tablet Take 25 mg by mouth daily      albuterol sulfate  (90 BASE) MCG/ACT inhaler Inhale 2 puffs into the lungs every 4 hours as needed for Wheezing or Shortness of Breath 1 Inhaler 11    amLODIPine (NORVASC) 10 MG tablet TAKE 1 TABLET BY MOUTH DAILY 30 tablet 0    SPIRIVA HANDIHALER 18 MCG inhalation capsule INHALE 1 CAPSULE INTO THE LUNGS DAILY USING THE HANDIHALER 30 capsule 0    metoprolol tartrate (LOPRESSOR) 25 MG tablet Take 1 tablet by mouth 2 times daily (Patient taking differently: Take 50 mg by mouth 2 times daily ) 60 tablet 1    ketoconazole (NIZORAL) 2 % cream Apply topically daily Apply topically daily. No current facility-administered medications for this visit. Allergies   Allergen Reactions    Azithromycin     Codeine         Review of Systems:  Review of systems performed and negative with the exception of the above findings    OBJECTIVE:  BP (!) 154/70   Temp 97.5 °F (36.4 °C) (Infrared)   Wt 180 lb (81.6 kg)   BMI 38.95 kg/m²      Physical Exam:  General appearance: alert, appears stated age, cooperative and no distress  Head: Normocephalic, without obvious abnormality, atraumatic  Lungs: clear to auscultation bilaterally  Heart: regular rate and rhythm, S1, S2 normal, no murmur, click, rub or gallop  Abdomen: soft, non-distended, non-tender    No results displayed because visit has over 200 results. Xr Abdomen (kub) (single Ap View)    Result Date: 9/23/2020  EXAMINATION: ONE SUPINE XRAY VIEW(S) OF THE ABDOMEN 9/23/2020 10:45 am COMPARISON: 02/16/2013 HISTORY: ORDERING SYSTEM PROVIDED HISTORY: Abdominal fullness TECHNOLOGIST PROVIDED HISTORY: Reason for exam:->Abdominal fullness Reason for Exam: Abdominal fullness Acuity: Unknown Type of Exam: Ongoing FINDINGS: Gas seen in stomach and nondilated colon. No significant small bowel gas. Unchanged right pelvic calcification most likely representing a phlebolith. No extraluminal gas.      Unremarkable appearing abdomen Us Renal Complete    Result Date: 9/19/2020  EXAMINATION: RETROPERITONEAL ULTRASOUND OF THE KIDNEYS AND URINARY BLADDER 9/19/2020 COMPARISON: 09/18/2020 CT HISTORY: ORDERING SYSTEM PROVIDED HISTORY: abnormal ct TECHNOLOGIST PROVIDED HISTORY: Reason for exam:->abnormal ct Complex cystic lesion at the upper pole of the left kidney. FINDINGS: Kidneys: The right kidney measures 8.7 x 5.0 x 5.1 cm in length and the left kidney measures 8.3 x 4.6 x 6.1 cm in length. The kidneys demonstrate cortical thinning but normal cortical echogenicity. There is no evidence of nephrolithiasis or hydronephrosis. No cystic or solid mass can be appreciated with ultrasound. Bladder: Unremarkable appearance of the bladder. No significant post void residual.     1. Small kidneys with cortical thinning suggests chronic medical renal disease. 2. No ultrasound evidence of cyst to correlate to the prior CT. Typical follow-up of a complex cystic lesion of the kidney would be for the patient to undergo an MRI of the abdomen with and without contrast using a dedicated renal protocol. Ct Abdomen Pelvis W Iv Contrast Additional Contrast? None    Result Date: 9/18/2020  EXAMINATION: CT OF THE ABDOMEN AND PELVIS WITH CONTRAST 9/18/2020 11:13 am TECHNIQUE: CT of the abdomen and pelvis was performed with the administration of intravenous contrast. Multiplanar reformatted images are provided for review. Dose modulation, iterative reconstruction, and/or weight based adjustment of the mA/kV was utilized to reduce the radiation dose to as low as reasonably achievable. COMPARISON: None. HISTORY: ORDERING SYSTEM PROVIDED HISTORY: abd pain, distention, r/o SBO TECHNOLOGIST PROVIDED HISTORY: Reason for exam:->abd pain, distention, r/o SBO Additional Contrast?->None Reason for Exam: abd pain, distention, r/o SBO Acuity: Acute Type of Exam: Initial Former smoker with 30 pack year history. FINDINGS: Lower Chest: Inferior lung bases are clear.   Heart size is normal. Organs: Liver is normal in appearance without worrisome focal lesion. Prominent gallstone in the neck of the gallbladder. Gallbladder wall is thickened and mild pericholecystic inflammatory stranding present. 13 mm low-density lesion posteriorly in the upper left kidney not clearly a simple cyst.  Very small cortical simple cyst posteriorly in the upper right kidney. Other abdominal organs appear normal. GI/Bowel: Prior right hemicolectomy with entero colonic anastomosis anterior to the mid liver. No bowel obstruction. Severe sigmoid diverticulosis without diverticulitis. Pelvis: Uterus remains and appears normal for age. Urinary bladder appears normal.  No free fluid or adenopathy. Peritoneum/Retroperitoneum: No mass, adenopathy, or ascites. Normal size of the aorta. No free air. Bones/Soft Tissues: No acute abnormality. Cholelithiasis and findings of mild acute cholecystitis. Indeterminate 13 mm cystic appearing lesion in the posterior upper left kidney. Lesion not seen on CT chest 05/23/2017. No other significant abnormality. RECOMMENDATIONS: Non urgent renal ultrasound suggested for further evaluation the left renal lesion. Us Gallbladder Ruq    Result Date: 9/18/2020  EXAMINATION: RIGHT UPPER QUADRANT ULTRASOUND 9/18/2020 9:35 pm COMPARISON: CT abdomen pelvis 09/18/2020 HISTORY: ORDERING SYSTEM PROVIDED HISTORY: cholecystitis TECHNOLOGIST PROVIDED HISTORY: Reason for exam:->cholecystitis FINDINGS: LIVER:  The liver demonstrates normal echogenicity without evidence of intrahepatic biliary ductal dilatation. BILIARY SYSTEM:  Cholelithiasis with gallbladder wall thickening and positive sonographic Madird's sign. Common bile duct is within normal limits measuring 4 mm. RIGHT KIDNEY: The right kidney is grossly unremarkable without evidence of hydronephrosis. PANCREAS:  Visualized portions of the pancreas are unremarkable. OTHER: No evidence of right upper quadrant ascites.      Acute cholecystitis. No choledocholithiasis or duct dilatation. ASSESSMENT:  Cholecystitis  UTI  Thrombocytopenia  COPD on home oxygen  History of colon cancer    PLAN:  1. Given options including elective cholecystectomy, close observation. As she is feeling so well and has multiple medical comorbidities, she wishes to observe at this time  2. In the interim, the patient is to avoid high fat foods, which are known triggers for biliary colic  3. Return to office in 3 months for follow-up. Just prior to that visit will contact the office to order a CBC to monitor the thrombocytopenia and this will be reviewed at the office visit    Juan Brunson.  Brooke Vera MD, FACS  10/2/2020  11:59 AM

## 2020-10-02 NOTE — PATIENT INSTRUCTIONS
1.  Given options including elective cholecystectomy, close observation. As she is feeling so well and has multiple medical comorbidities, she wishes to observe at this time  2. In the interim, the patient is to avoid high fat foods, which are known triggers for biliary colic  3. Return to office in 3 months for follow-up.   Just prior to that visit will contact the office to order a CBC to monitor the thrombocytopenia and this will be reviewed at the office visit

## 2020-11-21 ENCOUNTER — HOSPITAL ENCOUNTER (EMERGENCY)
Age: 80
Discharge: HOME | End: 2020-11-21
Payer: MEDICARE

## 2020-11-21 ENCOUNTER — APPOINTMENT (OUTPATIENT)
Dept: CT IMAGING | Age: 80
End: 2020-11-21
Payer: MEDICARE

## 2020-11-21 VITALS
OXYGEN SATURATION: 96 % | RESPIRATION RATE: 16 BRPM | HEIGHT: 62 IN | TEMPERATURE: 97.9 F | SYSTOLIC BLOOD PRESSURE: 146 MMHG | HEART RATE: 92 BPM | BODY MASS INDEX: 33.86 KG/M2 | WEIGHT: 184 LBS | DIASTOLIC BLOOD PRESSURE: 68 MMHG

## 2020-11-21 LAB
A/G RATIO: 1.5 (ref 1.1–2.2)
ALBUMIN SERPL-MCNC: 4.4 G/DL (ref 3.4–5)
ALP BLD-CCNC: 116 U/L (ref 40–129)
ALT SERPL-CCNC: 14 U/L (ref 10–40)
ANION GAP SERPL CALCULATED.3IONS-SCNC: 10 MMOL/L (ref 3–16)
AST SERPL-CCNC: 19 U/L (ref 15–37)
BASOPHILS ABSOLUTE: 0 K/UL (ref 0–0.2)
BASOPHILS RELATIVE PERCENT: 0 %
BILIRUB SERPL-MCNC: 0.4 MG/DL (ref 0–1)
BUN BLDV-MCNC: 19 MG/DL (ref 7–20)
CALCIUM SERPL-MCNC: 9.7 MG/DL (ref 8.3–10.6)
CHLORIDE BLD-SCNC: 96 MMOL/L (ref 99–110)
CO2: 33 MMOL/L (ref 21–32)
CREAT SERPL-MCNC: 0.9 MG/DL (ref 0.6–1.2)
EOSINOPHILS ABSOLUTE: 0 K/UL (ref 0–0.6)
EOSINOPHILS RELATIVE PERCENT: 0 %
GFR AFRICAN AMERICAN: >60
GFR NON-AFRICAN AMERICAN: >60
GLOBULIN: 3 G/DL
GLUCOSE BLD-MCNC: 140 MG/DL (ref 70–99)
HCT VFR BLD CALC: 37.3 % (ref 36–48)
HEMOGLOBIN: 12.6 G/DL (ref 12–16)
LIPASE: 31 U/L (ref 13–60)
LYMPHOCYTES ABSOLUTE: 1.1 K/UL (ref 1–5.1)
LYMPHOCYTES RELATIVE PERCENT: 12 %
MCH RBC QN AUTO: 28.6 PG (ref 26–34)
MCHC RBC AUTO-ENTMCNC: 33.9 G/DL (ref 31–36)
MCV RBC AUTO: 84.6 FL (ref 80–100)
MONOCYTES ABSOLUTE: 0.6 K/UL (ref 0–1.3)
MONOCYTES RELATIVE PERCENT: 6 %
NEUTROPHILS ABSOLUTE: 7.5 K/UL (ref 1.7–7.7)
NEUTROPHILS RELATIVE PERCENT: 82 %
PDW BLD-RTO: 15 % (ref 12.4–15.4)
PLATELET # BLD: 36 K/UL (ref 135–450)
PLATELET SLIDE REVIEW: ABNORMAL
PMV BLD AUTO: 15.1 FL (ref 5–10.5)
POTASSIUM SERPL-SCNC: 3.7 MMOL/L (ref 3.5–5.1)
RBC # BLD: 4.4 M/UL (ref 4–5.2)
RBC # BLD: NORMAL 10*6/UL
SLIDE REVIEW: ABNORMAL
SODIUM BLD-SCNC: 139 MMOL/L (ref 136–145)
TOTAL PROTEIN: 7.4 G/DL (ref 6.4–8.2)
WBC # BLD: 9.2 K/UL (ref 4–11)

## 2020-11-21 PROCEDURE — 6360000004 HC RX CONTRAST MEDICATION: Performed by: PHYSICIAN ASSISTANT

## 2020-11-21 PROCEDURE — 74177 CT ABD & PELVIS W/CONTRAST: CPT

## 2020-11-21 PROCEDURE — 80053 COMPREHEN METABOLIC PANEL: CPT

## 2020-11-21 PROCEDURE — 36415 COLL VENOUS BLD VENIPUNCTURE: CPT

## 2020-11-21 PROCEDURE — 99284 EMERGENCY DEPT VISIT MOD MDM: CPT

## 2020-11-21 PROCEDURE — 6370000000 HC RX 637 (ALT 250 FOR IP): Performed by: PHYSICIAN ASSISTANT

## 2020-11-21 PROCEDURE — 83690 ASSAY OF LIPASE: CPT

## 2020-11-21 PROCEDURE — 85025 COMPLETE CBC W/AUTO DIFF WBC: CPT

## 2020-11-21 RX ORDER — OXYMETAZOLINE HYDROCHLORIDE 0.05 G/100ML
2 SPRAY NASAL ONCE
Status: COMPLETED | OUTPATIENT
Start: 2020-11-21 | End: 2020-11-21

## 2020-11-21 RX ADMIN — OXYMETAZOLINE HYDROCHLORIDE 2 SPRAY: 5 SPRAY NASAL at 11:50

## 2020-11-21 RX ADMIN — IOPAMIDOL 75 ML: 755 INJECTION, SOLUTION INTRAVENOUS at 11:01

## 2020-11-21 ASSESSMENT — ENCOUNTER SYMPTOMS
VOMITING: 0
SORE THROAT: 0
SHORTNESS OF BREATH: 0
NAUSEA: 0
COUGH: 0
BACK PAIN: 0
BLOOD IN STOOL: 0
ABDOMINAL PAIN: 0
EYE PAIN: 0

## 2020-11-21 ASSESSMENT — PAIN DESCRIPTION - LOCATION: LOCATION: ABDOMEN

## 2020-11-21 ASSESSMENT — PAIN SCALES - GENERAL: PAINLEVEL_OUTOF10: 3

## 2020-11-21 ASSESSMENT — PAIN DESCRIPTION - PAIN TYPE: TYPE: ACUTE PAIN

## 2020-11-21 NOTE — ED PROVIDER NOTES
[address] [facility]        Pt Name: Loni Gorman  MRN: [id number]  [place] 1940  Date of evaluation: 11/21/2020  Provider: NADJA Johnson  PCP: Belita Closs, MD    VAISHALI. I have evaluated this patient. My supervising physician was available for consultation. CHIEF COMPLAINT       Chief Complaint   Patient presents with    Epistaxis     STARTED LAST NIGHT. CLOTS COMING OUT. NOSE AND MOUTH. REPORTS SHE DOES NO TAKE A BLOOD THINNER. NO BLEEDING AT TRIAGE.  Abdominal Pain     RECENT DX WITH GALL STONES. SINCE BLOOD GOING DOWN HER GI TRACK. DENIES N/V/D       HISTORY OF PRESENT ILLNESS   (Location, Timing/Onset, Context/Setting, Quality, Duration, Modifying Factors, Severity, Associated Signs and Symptoms)  Note limiting factors. Loni Gorman is a 79 y.o. female presents emergency room for epistaxis. Patient reports that yesterday evening she began to have bleeding from her nose, some of it would go to the back of her throat. Bleeding resolved spontaneously. She uses home oxygen for COPD, is concerned about the possibility of the bleeding recurring. She is also concerned because she is being treated conservatively for gallbladder wall thickening, is concerned that the ingestion of blood because worsening of the gallbladder thickening. Denies fever, chest pain, shortness of breath, nausea, vomiting, black or bloody stool, abdominal pain (contrary to triage note). Nursing Notes were all reviewed and agreed with or any disagreements were addressed in the HPI. REVIEW OF SYSTEMS    (2-9 systems for level 4, 10 or more for level 5)     Review of Systems   Constitutional: Negative for fever. HENT: Positive for nosebleeds. Negative for sore throat. Eyes: Negative for pain and visual disturbance. Respiratory: Negative for cough and shortness of breath. Cardiovascular: Negative for chest pain.    Gastrointestinal: Negative for abdominal pain, blood in stool, nausea and vomiting. Genitourinary: Negative for dysuria and frequency. Musculoskeletal: Negative for back pain and neck pain. Skin: Negative for rash. Neurological: Negative for dizziness, weakness, numbness and headaches. Psychiatric/Behavioral: Negative for confusion. Positives and Pertinent negatives as per HPI. Except as noted above in the ROS, all other systems were reviewed and negative. PAST MEDICAL HISTORY     Past Medical History:   Diagnosis Date    Cataracts, bilateral     Cervical cancer screening 2000    Nml per pt'    COPD (chronic obstructive pulmonary disease) ([facility] [address]     Portable oxygen:2 L/min    Gall stones     H/O colonoscopy 1990    polyp    H/O mammogram 2013    Nml per pt'.  Hypertension 2010    Morbid obesity with BMI of 40.0-44.9, adult ([facility] [address] 9/22/2020         SURGICAL HISTORY     Past Surgical History:   Procedure Laterality Date    COLON SURGERY  1990    colon cancer:s/p surgery per pt'         CURRENTMEDICATIONS       Discharge Medication List as of 11/21/2020 12:30 PM      CONTINUE these medications which have NOT CHANGED    Details   fluticasone-salmeterol (ADVAIR DISKUS) 250-50 MCG/DOSE AEPB Inhale 1 puff into the lungs every 12 hoursHistorical Med      albuterol sulfate  (90 BASE) MCG/ACT inhaler Inhale 2 puffs into the lungs every 4 hours as needed for Wheezing or Shortness of Breath, Disp-1 Inhaler, R-11Normal      amLODIPine (NORVASC) 10 MG tablet TAKE 1 TABLET BY MOUTH DAILY, Disp-30 tablet, R-0Normal      SPIRIVA HANDIHALER 18 MCG inhalation capsule INHALE 1 CAPSULE INTO THE LUNGS DAILY USING THE HANDIHALER, Disp-30 capsule, R-0Normal      metoprolol tartrate (LOPRESSOR) 25 MG tablet Take 1 tablet by mouth 2 times daily, Disp-60 tablet, R-1      ketoconazole (NIZORAL) 2 % cream Apply topically daily Apply topically daily. , Topical, Historical Med               ALLERGIES     Azithromycin and Codeine    FAMILYHISTORY       Family History   Problem Relation Age of Onset    Heart Disease Father     No Known Problems Mother     Cancer Sister     Arrhythmia Brother     No Known Problems Maternal Grandmother     No Known Problems Maternal Grandfather     No Known Problems Paternal Grandmother     No Known Problems Paternal Grandfather     No Known Problems Other     Cancer Sister     Cancer Brother     Cancer Brother           SOCIAL HISTORY       Social History     Tobacco Use    Smoking status: Former Smoker     Packs/day: 1.00     Years: 30.00     Pack years: 30.00     Types: Cigarettes     Last attempt to quit: 10/14/1995     Years since quittin.1    Smokeless tobacco: Never Used   Substance Use Topics    Alcohol use: No    Drug use: No       SCREENINGS             PHYSICAL EXAM    (up to 7 for level 4, 8 or more for level 5)     ED Triage Vitals [20 0951]   BP Temp Temp Source Pulse Resp SpO2 Height Weight   (!) 151/73 97.8 °F (36.6 °C) Temporal 99 16 96 % 5' 2\" (1.575 m) 184 lb (83.5 kg)       Physical Exam  Vitals signs reviewed. Constitutional:       Appearance: She is not diaphoretic. HENT:      Head: Normocephalic and atraumatic. Nose: Nose normal. No congestion or rhinorrhea. Comments: No site of epistaxis identified. No active epistaxis. Mouth/Throat:      Mouth: Mucous membranes are moist.      Pharynx: Oropharynx is clear. No oropharyngeal exudate or posterior oropharyngeal erythema. Eyes:      General: No scleral icterus. Conjunctiva/sclera: Conjunctivae normal.   Neck:      Musculoskeletal: Normal range of motion and neck supple. Cardiovascular:      Rate and Rhythm: Normal rate and regular rhythm. Pulses: Normal pulses. Heart sounds: Normal heart sounds. No murmur. No friction rub. No gallop. Pulmonary:      Effort: Pulmonary effort is normal. No respiratory distress. Breath sounds: Normal breath sounds. No stridor.  No and preliminarily interpreted by the ED Provider with the below findings:        Interpretation per the Radiologist below, if available at the time of this note:    CT ABDOMEN PELVIS W IV CONTRAST Additional Contrast? None   Preliminary Result   1. Redemonstration of gallbladder wall thickening with cholelithiasis   concerning for cholecystitis. Mild prominence of the extrahepatic biliary   tree, stable. 2. No other acute findings within the abdomen or pelvis. Previous right   hemicolectomy. No significant retained colonic stool. Diverticulosis with   no acute features. 3. Stable indeterminate left upper pole renal lesion. Follow-up   recommendations include ultrasound as noted on the previous study. No results found. PROCEDURES   Unless otherwise noted below, none     Procedures    CRITICAL CARE TIME   N/A    CONSULTS:  None      EMERGENCY DEPARTMENT COURSE and DIFFERENTIAL DIAGNOSIS/MDM:   Vitals:    Vitals:    11/21/20 0951 11/21/20 1215   BP: (!) 151/73 (!) 146/68   Pulse: 99 92   Resp: 16 16   Temp: 97.8 °F (36.6 °C) 97.9 °F (36.6 °C)   TempSrc: Temporal Infrared   SpO2: 96% 96%   Weight: 184 lb (83.5 kg)    Height: 5' 2\" (1.575 m)        Patient was given the following medications:  Medications   oxymetazoline (AFRIN) 0.05 % nasal spray 2 spray (2 sprays Each Nostril Given 11/21/20 1150)   iopamidol (ISOVUE-370) 76 % injection 75 mL (75 mLs Intravenous Given 11/21/20 1101)           39-year-old female presents emergency department for epistaxis. Epistaxis resolved prior to going emergent. On review of her previous labs, thrombocytopenia noted. Labs including CBC, CMP were ordered. Due to patient's age, I did elect to repeat her CT abdomen pelvis due to prior findings of gallbladder wall thickening though she does not have abdominal tenderness or pain at this time. CT scan stable from prior scans. Labs notable for worsening thrombocytopenia with platelets in 09J.   Discussed the case with on-call hematologist Dr. Sonja Lal, recommended that this patient is not currently having bleeding that admission is not warranted but she will be happy to follow the patient as an outpatient. She recommended discontinuation of hydrochlorothiazide. Patient instructed to follow-up with primary care physician, general surgeon, hematologist.  Markus Delilahsharon provided, instructed to use for no more than 3 days. Instructed to return emergency room for new or worsening symptoms including but not limited to recurrence of epistaxis, abdominal pain, fever, severe nausea or vomiting, black or bloody stool, any other symptoms she is concerned about. Verbal and written discharge instructions and return precautions given. FINAL IMPRESSION      1. Epistaxis    2. Thrombocytopenia ([address]    3. Thickening of wall of gallbladder          DISPOSITION/PLAN   DISPOSITION Decision To Discharge 11/21/2020 12:22:48 PM      PATIENT REFERREDTO:  Samuel Villafana MD  [address]  [phone]    Call in 1 day      Higinio Lackey MD  [address]  [phone]      Follow-up as scheduled    Marie Young MD  [address]  [phone]    Call   Please call to schedule follow-up ideally to be seen within 1 week.       DISCHARGE MEDICATIONS:  Discharge Medication List as of 11/21/2020 12:30 PM          DISCONTINUED MEDICATIONS:  Discharge Medication List as of 11/21/2020 12:30 PM      STOP taking these medications       hydrochlorothiazide (HYDRODIURIL) 25 MG tablet Comments:   Reason for Stopping:                      (Please note that portions of this note were completed with a voice recognition program.  Efforts were made to edit the dictations but occasionally words are mis-transcribed.)    NADJA Pal (electronically signed)         NADJA Pal  11/21/20 [address] [place] [address], [place]  11/21/20 1209

## 2020-11-21 NOTE — ED NOTES
Reviewed discharge instructions with patient. No questions at this time. No sign of distress. AOx3. Pt taken to ER exit via wheelchair.         Nigel Sosa RN  11/21/20 4905

## 2022-01-01 ENCOUNTER — ANESTHESIA EVENT (OUTPATIENT)
Dept: OPERATING ROOM | Age: 82
End: 2022-01-01

## 2022-01-01 ENCOUNTER — APPOINTMENT (OUTPATIENT)
Dept: CT IMAGING | Age: 82
DRG: 377 | End: 2022-01-01
Payer: MEDICARE

## 2022-01-01 ENCOUNTER — APPOINTMENT (OUTPATIENT)
Dept: GENERAL RADIOLOGY | Age: 82
DRG: 377 | End: 2022-01-01
Payer: MEDICARE

## 2022-01-01 ENCOUNTER — ANESTHESIA (OUTPATIENT)
Dept: ENDOSCOPY | Age: 82
DRG: 377 | End: 2022-01-01
Payer: MEDICARE

## 2022-01-01 ENCOUNTER — ANESTHESIA (OUTPATIENT)
Dept: OPERATING ROOM | Age: 82
End: 2022-01-01

## 2022-01-01 ENCOUNTER — HOSPITAL ENCOUNTER (INPATIENT)
Age: 82
LOS: 8 days | DRG: 377 | End: 2022-06-19
Attending: STUDENT IN AN ORGANIZED HEALTH CARE EDUCATION/TRAINING PROGRAM | Admitting: FAMILY MEDICINE
Payer: MEDICARE

## 2022-01-01 ENCOUNTER — APPOINTMENT (OUTPATIENT)
Dept: ULTRASOUND IMAGING | Age: 82
DRG: 377 | End: 2022-01-01
Payer: MEDICARE

## 2022-01-01 ENCOUNTER — ANESTHESIA EVENT (OUTPATIENT)
Dept: ENDOSCOPY | Age: 82
DRG: 377 | End: 2022-01-01
Payer: MEDICARE

## 2022-01-01 VITALS
DIASTOLIC BLOOD PRESSURE: 33 MMHG | HEIGHT: 62 IN | BODY MASS INDEX: 35.72 KG/M2 | TEMPERATURE: 97 F | RESPIRATION RATE: 26 BRPM | OXYGEN SATURATION: 94 % | HEART RATE: 114 BPM | WEIGHT: 194.1 LBS | SYSTOLIC BLOOD PRESSURE: 80 MMHG

## 2022-01-01 DIAGNOSIS — D64.9 ANEMIA, UNSPECIFIED TYPE: ICD-10-CM

## 2022-01-01 DIAGNOSIS — D50.0 BLOOD LOSS ANEMIA: Primary | ICD-10-CM

## 2022-01-01 DIAGNOSIS — J44.1 COPD EXACERBATION (HCC): ICD-10-CM

## 2022-01-01 DIAGNOSIS — R09.02 HYPOXIA: ICD-10-CM

## 2022-01-01 LAB
A/G RATIO: 1.8 (ref 1.1–2.2)
ABO/RH: NORMAL
ALBUMIN SERPL-MCNC: 4.2 G/DL (ref 3.4–5)
ALBUMIN SERPL-MCNC: 4.6 G/DL (ref 3.4–5)
ALP BLD-CCNC: 111 U/L (ref 40–129)
ALP BLD-CCNC: 83 U/L (ref 40–129)
ALT SERPL-CCNC: 16 U/L (ref 10–40)
ALT SERPL-CCNC: 36 U/L (ref 10–40)
ANION GAP SERPL CALCULATED.3IONS-SCNC: 10 MMOL/L (ref 3–16)
ANION GAP SERPL CALCULATED.3IONS-SCNC: 11 MMOL/L (ref 3–16)
ANION GAP SERPL CALCULATED.3IONS-SCNC: 31 MMOL/L (ref 3–16)
ANION GAP SERPL CALCULATED.3IONS-SCNC: 6 MMOL/L (ref 3–16)
ANION GAP SERPL CALCULATED.3IONS-SCNC: 7 MMOL/L (ref 3–16)
ANION GAP SERPL CALCULATED.3IONS-SCNC: 8 MMOL/L (ref 3–16)
ANION GAP SERPL CALCULATED.3IONS-SCNC: 8 MMOL/L (ref 3–16)
ANION GAP SERPL CALCULATED.3IONS-SCNC: 9 MMOL/L (ref 3–16)
ANTIBODY SCREEN: NORMAL
APTT: 25.5 SEC (ref 23–34.3)
AST SERPL-CCNC: 25 U/L (ref 15–37)
AST SERPL-CCNC: 45 U/L (ref 15–37)
BASE EXCESS ARTERIAL: -0.6 MMOL/L (ref -3–3)
BASE EXCESS ARTERIAL: 14 MMOL/L (ref -3–3)
BASE EXCESS ARTERIAL: 14.7 MMOL/L (ref -3–3)
BASE EXCESS ARTERIAL: 19.7 MMOL/L (ref -3–3)
BASE EXCESS ARTERIAL: 5 MMOL/L (ref -3–3)
BASOPHILS ABSOLUTE: 0 K/UL (ref 0–0.2)
BASOPHILS RELATIVE PERCENT: 0.6 %
BILIRUB SERPL-MCNC: 0.4 MG/DL (ref 0–1)
BILIRUB SERPL-MCNC: 0.8 MG/DL (ref 0–1)
BILIRUBIN DIRECT: <0.2 MG/DL (ref 0–0.3)
BILIRUBIN URINE: NEGATIVE
BILIRUBIN, INDIRECT: ABNORMAL MG/DL (ref 0–1)
BLOOD BANK DISPENSE STATUS: NORMAL
BLOOD BANK DISPENSE STATUS: NORMAL
BLOOD BANK PRODUCT CODE: NORMAL
BLOOD BANK PRODUCT CODE: NORMAL
BLOOD, URINE: NEGATIVE
BPU ID: NORMAL
BPU ID: NORMAL
BUN BLDV-MCNC: 23 MG/DL (ref 7–20)
BUN BLDV-MCNC: 25 MG/DL (ref 7–20)
BUN BLDV-MCNC: 25 MG/DL (ref 7–20)
BUN BLDV-MCNC: 27 MG/DL (ref 7–20)
BUN BLDV-MCNC: 31 MG/DL (ref 7–20)
BUN BLDV-MCNC: 34 MG/DL (ref 7–20)
BUN BLDV-MCNC: 38 MG/DL (ref 7–20)
BUN BLDV-MCNC: 43 MG/DL (ref 7–20)
BUN BLDV-MCNC: 46 MG/DL (ref 7–20)
BUN BLDV-MCNC: 49 MG/DL (ref 7–20)
C3 COMPLEMENT: 124 MG/DL (ref 90–180)
C4 COMPLEMENT: 20.2 MG/DL (ref 10–40)
CALCIUM SERPL-MCNC: 8.7 MG/DL (ref 8.3–10.6)
CALCIUM SERPL-MCNC: 8.7 MG/DL (ref 8.3–10.6)
CALCIUM SERPL-MCNC: 8.8 MG/DL (ref 8.3–10.6)
CALCIUM SERPL-MCNC: 8.9 MG/DL (ref 8.3–10.6)
CALCIUM SERPL-MCNC: 9.1 MG/DL (ref 8.3–10.6)
CALCIUM SERPL-MCNC: 9.3 MG/DL (ref 8.3–10.6)
CALCIUM SERPL-MCNC: 9.5 MG/DL (ref 8.3–10.6)
CALCIUM SERPL-MCNC: 9.6 MG/DL (ref 8.3–10.6)
CARBOXYHEMOGLOBIN ARTERIAL: 1.7 % (ref 0–1.5)
CARBOXYHEMOGLOBIN ARTERIAL: 2 % (ref 0–1.5)
CARBOXYHEMOGLOBIN ARTERIAL: 2.1 % (ref 0–1.5)
CARBOXYHEMOGLOBIN ARTERIAL: 2.3 % (ref 0–1.5)
CARBOXYHEMOGLOBIN ARTERIAL: 2.5 % (ref 0–1.5)
CEA: 7.2 NG/ML (ref 0–5)
CHLORIDE BLD-SCNC: 100 MMOL/L (ref 99–110)
CHLORIDE BLD-SCNC: 100 MMOL/L (ref 99–110)
CHLORIDE BLD-SCNC: 101 MMOL/L (ref 99–110)
CHLORIDE BLD-SCNC: 91 MMOL/L (ref 99–110)
CHLORIDE BLD-SCNC: 95 MMOL/L (ref 99–110)
CHLORIDE BLD-SCNC: 95 MMOL/L (ref 99–110)
CHLORIDE BLD-SCNC: 96 MMOL/L (ref 99–110)
CHLORIDE BLD-SCNC: 96 MMOL/L (ref 99–110)
CHLORIDE BLD-SCNC: 99 MMOL/L (ref 99–110)
CHLORIDE BLD-SCNC: 99 MMOL/L (ref 99–110)
CHLORIDE URINE RANDOM: 75 MMOL/L
CLARITY: CLEAR
CO2: 18 MMOL/L (ref 21–32)
CO2: 28 MMOL/L (ref 21–32)
CO2: 31 MMOL/L (ref 21–32)
CO2: 32 MMOL/L (ref 21–32)
CO2: 32 MMOL/L (ref 21–32)
CO2: 36 MMOL/L (ref 21–32)
CO2: 37 MMOL/L (ref 21–32)
CO2: 44 MMOL/L (ref 21–32)
COLOR: YELLOW
CREAT SERPL-MCNC: 1.2 MG/DL (ref 0.6–1.2)
CREAT SERPL-MCNC: 1.2 MG/DL (ref 0.6–1.2)
CREAT SERPL-MCNC: 1.3 MG/DL (ref 0.6–1.2)
CREAT SERPL-MCNC: 1.4 MG/DL (ref 0.6–1.2)
CREAT SERPL-MCNC: 1.4 MG/DL (ref 0.6–1.2)
CREAT SERPL-MCNC: 1.6 MG/DL (ref 0.6–1.2)
CREAT SERPL-MCNC: 2.4 MG/DL (ref 0.6–1.2)
DESCRIPTION BLOOD BANK: NORMAL
DESCRIPTION BLOOD BANK: NORMAL
EKG ATRIAL RATE: 68 BPM
EKG DIAGNOSIS: NORMAL
EKG Q-T INTERVAL: 372 MS
EKG QRS DURATION: 90 MS
EKG QTC CALCULATION (BAZETT): 439 MS
EKG R AXIS: 18 DEGREES
EKG T AXIS: 49 DEGREES
EKG VENTRICULAR RATE: 84 BPM
EOSINOPHILS ABSOLUTE: 0 K/UL (ref 0–0.6)
EOSINOPHILS RELATIVE PERCENT: 0.7 %
FERRITIN: 22.3 NG/ML (ref 15–150)
GFR AFRICAN AMERICAN: 23
GFR AFRICAN AMERICAN: 37
GFR AFRICAN AMERICAN: 43
GFR AFRICAN AMERICAN: 43
GFR AFRICAN AMERICAN: 47
GFR AFRICAN AMERICAN: 52
GFR AFRICAN AMERICAN: 52
GFR NON-AFRICAN AMERICAN: 19
GFR NON-AFRICAN AMERICAN: 31
GFR NON-AFRICAN AMERICAN: 36
GFR NON-AFRICAN AMERICAN: 36
GFR NON-AFRICAN AMERICAN: 39
GFR NON-AFRICAN AMERICAN: 43
GFR NON-AFRICAN AMERICAN: 43
GLUCOSE BLD-MCNC: 107 MG/DL (ref 70–99)
GLUCOSE BLD-MCNC: 135 MG/DL (ref 70–99)
GLUCOSE BLD-MCNC: 138 MG/DL (ref 70–99)
GLUCOSE BLD-MCNC: 143 MG/DL (ref 70–99)
GLUCOSE BLD-MCNC: 146 MG/DL (ref 70–99)
GLUCOSE BLD-MCNC: 150 MG/DL (ref 70–99)
GLUCOSE BLD-MCNC: 151 MG/DL (ref 70–99)
GLUCOSE BLD-MCNC: 179 MG/DL (ref 70–99)
GLUCOSE BLD-MCNC: 226 MG/DL (ref 70–99)
GLUCOSE BLD-MCNC: 98 MG/DL (ref 70–99)
GLUCOSE URINE: NEGATIVE MG/DL
HCO3 ARTERIAL: 24 MMOL/L (ref 21–29)
HCO3 ARTERIAL: 30.4 MMOL/L (ref 21–29)
HCO3 ARTERIAL: 42.3 MMOL/L (ref 21–29)
HCO3 ARTERIAL: 42.8 MMOL/L (ref 21–29)
HCO3 ARTERIAL: 45.5 MMOL/L (ref 21–29)
HCT VFR BLD CALC: 20.8 % (ref 36–48)
HCT VFR BLD CALC: 21.8 % (ref 36–48)
HCT VFR BLD CALC: 23.5 % (ref 36–48)
HCT VFR BLD CALC: 24.1 % (ref 36–48)
HCT VFR BLD CALC: 24.3 % (ref 36–48)
HCT VFR BLD CALC: 24.4 % (ref 36–48)
HCT VFR BLD CALC: 24.8 % (ref 36–48)
HCT VFR BLD CALC: 25.2 % (ref 36–48)
HCT VFR BLD CALC: 26.3 % (ref 36–48)
HCT VFR BLD CALC: 26.4 % (ref 36–48)
HCT VFR BLD CALC: 26.5 % (ref 36–48)
HCT VFR BLD CALC: 26.6 % (ref 36–48)
HCT VFR BLD CALC: 27.7 % (ref 36–48)
HCT VFR BLD CALC: 28.6 % (ref 36–48)
HCT VFR BLD CALC: 29.2 % (ref 36–48)
HCT VFR BLD CALC: 30.1 % (ref 36–48)
HCT VFR BLD CALC: 33.5 % (ref 36–48)
HEMOGLOBIN, ART, EXTENDED: 10.1 G/DL (ref 12–16)
HEMOGLOBIN, ART, EXTENDED: 10.3 G/DL (ref 12–16)
HEMOGLOBIN, ART, EXTENDED: 7.4 G/DL (ref 12–16)
HEMOGLOBIN, ART, EXTENDED: 8.2 G/DL (ref 12–16)
HEMOGLOBIN, ART, EXTENDED: 9.7 G/DL (ref 12–16)
HEMOGLOBIN: 10.1 G/DL (ref 12–16)
HEMOGLOBIN: 6.2 G/DL (ref 12–16)
HEMOGLOBIN: 6.6 G/DL (ref 12–16)
HEMOGLOBIN: 7.1 G/DL (ref 12–16)
HEMOGLOBIN: 7.2 G/DL (ref 12–16)
HEMOGLOBIN: 7.4 G/DL (ref 12–16)
HEMOGLOBIN: 7.5 G/DL (ref 12–16)
HEMOGLOBIN: 7.5 G/DL (ref 12–16)
HEMOGLOBIN: 7.8 G/DL (ref 12–16)
HEMOGLOBIN: 7.9 G/DL (ref 12–16)
HEMOGLOBIN: 8.1 G/DL (ref 12–16)
HEMOGLOBIN: 8.1 G/DL (ref 12–16)
HEMOGLOBIN: 8.4 G/DL (ref 12–16)
HEMOGLOBIN: 8.4 G/DL (ref 12–16)
HEMOGLOBIN: 8.7 G/DL (ref 12–16)
HEMOGLOBIN: 9.1 G/DL (ref 12–16)
HEMOGLOBIN: 9.2 G/DL (ref 12–16)
INFLUENZA A: NOT DETECTED
INFLUENZA B: NOT DETECTED
INR BLD: 1.34 (ref 0.87–1.14)
IRON SATURATION: 4 % (ref 15–50)
IRON: 18 UG/DL (ref 37–145)
KETONES, URINE: NEGATIVE MG/DL
L. PNEUMOPHILA SEROGP 1 UR AG: NORMAL
LACTIC ACID: 2.8 MMOL/L (ref 0.4–2)
LEUKOCYTE ESTERASE, URINE: NEGATIVE
LV EF: 58 %
LVEF MODALITY: NORMAL
LYMPHOCYTES ABSOLUTE: 0.7 K/UL (ref 1–5.1)
LYMPHOCYTES RELATIVE PERCENT: 11.5 %
MAGNESIUM: 1.9 MG/DL (ref 1.8–2.4)
MCH RBC QN AUTO: 21.6 PG (ref 26–34)
MCH RBC QN AUTO: 22.7 PG (ref 26–34)
MCH RBC QN AUTO: 23.3 PG (ref 26–34)
MCH RBC QN AUTO: 23.8 PG (ref 26–34)
MCH RBC QN AUTO: 23.9 PG (ref 26–34)
MCH RBC QN AUTO: 24.3 PG (ref 26–34)
MCH RBC QN AUTO: 24.4 PG (ref 26–34)
MCH RBC QN AUTO: 24.4 PG (ref 26–34)
MCHC RBC AUTO-ENTMCNC: 29.9 G/DL (ref 31–36)
MCHC RBC AUTO-ENTMCNC: 30 G/DL (ref 31–36)
MCHC RBC AUTO-ENTMCNC: 30.1 G/DL (ref 31–36)
MCHC RBC AUTO-ENTMCNC: 30.3 G/DL (ref 31–36)
MCHC RBC AUTO-ENTMCNC: 30.5 G/DL (ref 31–36)
MCHC RBC AUTO-ENTMCNC: 30.7 G/DL (ref 31–36)
MCHC RBC AUTO-ENTMCNC: 30.8 G/DL (ref 31–36)
MCHC RBC AUTO-ENTMCNC: 31.5 G/DL (ref 31–36)
MCV RBC AUTO: 72.4 FL (ref 80–100)
MCV RBC AUTO: 74 FL (ref 80–100)
MCV RBC AUTO: 75.8 FL (ref 80–100)
MCV RBC AUTO: 76.9 FL (ref 80–100)
MCV RBC AUTO: 78.1 FL (ref 80–100)
MCV RBC AUTO: 79.2 FL (ref 80–100)
MCV RBC AUTO: 81 FL (ref 80–100)
MCV RBC AUTO: 81.2 FL (ref 80–100)
METHEMOGLOBIN ARTERIAL: 0 %
METHEMOGLOBIN ARTERIAL: 0.2 %
METHEMOGLOBIN ARTERIAL: 0.3 %
METHEMOGLOBIN ARTERIAL: 0.7 %
METHEMOGLOBIN ARTERIAL: ABNORMAL %
MICROSCOPIC EXAMINATION: NORMAL
MONOCYTES ABSOLUTE: 0.6 K/UL (ref 0–1.3)
MONOCYTES RELATIVE PERCENT: 8.6 %
NEUTROPHILS ABSOLUTE: 5.1 K/UL (ref 1.7–7.7)
NEUTROPHILS RELATIVE PERCENT: 78.6 %
NITRITE, URINE: NEGATIVE
O2 SAT, ARTERIAL: 82.3 %
O2 SAT, ARTERIAL: 89.4 %
O2 SAT, ARTERIAL: 93.9 %
O2 SAT, ARTERIAL: 94.7 %
O2 SAT, ARTERIAL: 97.9 %
O2 THERAPY: ABNORMAL
OCCULT BLOOD DIAGNOSTIC: ABNORMAL
PCO2 ARTERIAL: 38 MMHG (ref 35–45)
PCO2 ARTERIAL: 50 MMHG (ref 35–45)
PCO2 ARTERIAL: 61.4 MMHG (ref 35–45)
PCO2 ARTERIAL: 76.4 MMHG (ref 35–45)
PCO2 ARTERIAL: 78 MMHG (ref 35–45)
PDW BLD-RTO: 16.5 % (ref 12.4–15.4)
PDW BLD-RTO: 17.6 % (ref 12.4–15.4)
PDW BLD-RTO: 17.6 % (ref 12.4–15.4)
PDW BLD-RTO: 17.8 % (ref 12.4–15.4)
PDW BLD-RTO: 19.1 % (ref 12.4–15.4)
PDW BLD-RTO: 19.4 % (ref 12.4–15.4)
PDW BLD-RTO: 20 % (ref 12.4–15.4)
PDW BLD-RTO: 21.2 % (ref 12.4–15.4)
PH ARTERIAL: 7.34 (ref 7.35–7.45)
PH ARTERIAL: 7.36 (ref 7.35–7.45)
PH ARTERIAL: 7.39 (ref 7.35–7.45)
PH ARTERIAL: 7.41 (ref 7.35–7.45)
PH ARTERIAL: 7.48 (ref 7.35–7.45)
PH UA: 5 (ref 5–8)
PLATELET # BLD: 108 K/UL (ref 135–450)
PLATELET # BLD: 125 K/UL (ref 135–450)
PLATELET # BLD: 126 K/UL (ref 135–450)
PLATELET # BLD: 143 K/UL (ref 135–450)
PLATELET # BLD: 50 K/UL (ref 135–450)
PLATELET # BLD: 52 K/UL (ref 135–450)
PLATELET # BLD: 85 K/UL (ref 135–450)
PLATELET # BLD: 90 K/UL (ref 135–450)
PLATELET SLIDE REVIEW: ABNORMAL
PLATELET SLIDE REVIEW: ABNORMAL
PMV BLD AUTO: 10.2 FL (ref 5–10.5)
PMV BLD AUTO: 10.2 FL (ref 5–10.5)
PMV BLD AUTO: 10.5 FL (ref 5–10.5)
PMV BLD AUTO: 11.1 FL (ref 5–10.5)
PMV BLD AUTO: 12.7 FL (ref 5–10.5)
PMV BLD AUTO: 12.8 FL (ref 5–10.5)
PMV BLD AUTO: 14.6 FL (ref 5–10.5)
PMV BLD AUTO: 9.8 FL (ref 5–10.5)
PO2 ARTERIAL: 53.6 MMHG (ref 75–108)
PO2 ARTERIAL: 62.9 MMHG (ref 75–108)
PO2 ARTERIAL: 67.4 MMHG (ref 75–108)
PO2 ARTERIAL: 69.2 MMHG (ref 75–108)
PO2 ARTERIAL: 85.7 MMHG (ref 75–108)
POTASSIUM REFLEX MAGNESIUM: 3.5 MMOL/L (ref 3.5–5.1)
POTASSIUM REFLEX MAGNESIUM: 3.7 MMOL/L (ref 3.5–5.1)
POTASSIUM SERPL-SCNC: 3.5 MMOL/L (ref 3.5–5.1)
POTASSIUM SERPL-SCNC: 3.7 MMOL/L (ref 3.5–5.1)
POTASSIUM SERPL-SCNC: 3.9 MMOL/L (ref 3.5–5.1)
POTASSIUM SERPL-SCNC: 3.9 MMOL/L (ref 3.5–5.1)
POTASSIUM SERPL-SCNC: 4.4 MMOL/L (ref 3.5–5.1)
POTASSIUM SERPL-SCNC: 4.5 MMOL/L (ref 3.5–5.1)
POTASSIUM SERPL-SCNC: 4.6 MMOL/L (ref 3.5–5.1)
POTASSIUM SERPL-SCNC: 4.6 MMOL/L (ref 3.5–5.1)
POTASSIUM, UR: 30.4 MMOL/L
PRO-BNP: 1159 PG/ML (ref 0–449)
PRO-BNP: 1950 PG/ML (ref 0–449)
PROCALCITONIN: 0.09 NG/ML (ref 0–0.15)
PROTEIN UA: NEGATIVE MG/DL
PROTHROMBIN TIME: 16.5 SEC (ref 11.7–14.5)
RBC # BLD: 2.87 M/UL (ref 4–5.2)
RBC # BLD: 3.05 M/UL (ref 4–5.2)
RBC # BLD: 3.08 M/UL (ref 4–5.2)
RBC # BLD: 3.26 M/UL (ref 4–5.2)
RBC # BLD: 3.28 M/UL (ref 4–5.2)
RBC # BLD: 3.51 M/UL (ref 4–5.2)
RBC # BLD: 3.61 M/UL (ref 4–5.2)
RBC # BLD: 3.67 M/UL (ref 4–5.2)
REPORT: NORMAL
RESPIRATORY PANEL PCR: NORMAL
SARS-COV-2 RNA, RT PCR: NOT DETECTED
SLIDE REVIEW: ABNORMAL
SLIDE REVIEW: ABNORMAL
SODIUM BLD-SCNC: 139 MMOL/L (ref 136–145)
SODIUM BLD-SCNC: 140 MMOL/L (ref 136–145)
SODIUM BLD-SCNC: 141 MMOL/L (ref 136–145)
SODIUM BLD-SCNC: 141 MMOL/L (ref 136–145)
SODIUM BLD-SCNC: 142 MMOL/L (ref 136–145)
SODIUM BLD-SCNC: 142 MMOL/L (ref 136–145)
SODIUM BLD-SCNC: 145 MMOL/L (ref 136–145)
SODIUM BLD-SCNC: 146 MMOL/L (ref 136–145)
SODIUM URINE: 52 MMOL/L
SPECIFIC GRAVITY UA: 1.01 (ref 1–1.03)
TCO2 ARTERIAL: 100.2 MMOL/L
TCO2 ARTERIAL: 101.1 MMOL/L
TCO2 ARTERIAL: 106.2 MMOL/L
TCO2 ARTERIAL: 56.3 MMOL/L
TCO2 ARTERIAL: 71.7 MMOL/L
TOTAL IRON BINDING CAPACITY: 457 UG/DL (ref 260–445)
TOTAL PROTEIN: 6.5 G/DL (ref 6.4–8.2)
TOTAL PROTEIN: 7.1 G/DL (ref 6.4–8.2)
TROPONIN: <0.01 NG/ML
URIC ACID, SERUM: 8 MG/DL (ref 2.6–6)
URIC ACID, SERUM: 8.1 MG/DL (ref 2.6–6)
URIC ACID, SERUM: 8.6 MG/DL (ref 2.6–6)
URIC ACID, SERUM: 8.8 MG/DL (ref 2.6–6)
URINE TYPE: NORMAL
UROBILINOGEN, URINE: 0.2 E.U./DL
WBC # BLD: 10.4 K/UL (ref 4–11)
WBC # BLD: 12.2 K/UL (ref 4–11)
WBC # BLD: 13 K/UL (ref 4–11)
WBC # BLD: 17.5 K/UL (ref 4–11)
WBC # BLD: 18.7 K/UL (ref 4–11)
WBC # BLD: 4.6 K/UL (ref 4–11)
WBC # BLD: 6.5 K/UL (ref 4–11)
WBC # BLD: 7.8 K/UL (ref 4–11)

## 2022-01-01 PROCEDURE — 0202U NFCT DS 22 TRGT SARS-COV-2: CPT

## 2022-01-01 PROCEDURE — 99233 SBSQ HOSP IP/OBS HIGH 50: CPT | Performed by: INTERNAL MEDICINE

## 2022-01-01 PROCEDURE — 84145 PROCALCITONIN (PCT): CPT

## 2022-01-01 PROCEDURE — 85027 COMPLETE CBC AUTOMATED: CPT

## 2022-01-01 PROCEDURE — 85018 HEMOGLOBIN: CPT

## 2022-01-01 PROCEDURE — 6370000000 HC RX 637 (ALT 250 FOR IP): Performed by: INTERNAL MEDICINE

## 2022-01-01 PROCEDURE — 0BJ08ZZ INSPECTION OF TRACHEOBRONCHIAL TREE, VIA NATURAL OR ARTIFICIAL OPENING ENDOSCOPIC: ICD-10-PCS | Performed by: INTERNAL MEDICINE

## 2022-01-01 PROCEDURE — 94669 MECHANICAL CHEST WALL OSCILL: CPT

## 2022-01-01 PROCEDURE — 6360000004 HC RX CONTRAST MEDICATION: Performed by: INTERNAL MEDICINE

## 2022-01-01 PROCEDURE — 0DB98ZX EXCISION OF DUODENUM, VIA NATURAL OR ARTIFICIAL OPENING ENDOSCOPIC, DIAGNOSTIC: ICD-10-PCS | Performed by: INTERNAL MEDICINE

## 2022-01-01 PROCEDURE — 6370000000 HC RX 637 (ALT 250 FOR IP): Performed by: FAMILY MEDICINE

## 2022-01-01 PROCEDURE — 83540 ASSAY OF IRON: CPT

## 2022-01-01 PROCEDURE — 83735 ASSAY OF MAGNESIUM: CPT

## 2022-01-01 PROCEDURE — 36600 WITHDRAWAL OF ARTERIAL BLOOD: CPT

## 2022-01-01 PROCEDURE — 80048 BASIC METABOLIC PNL TOTAL CA: CPT

## 2022-01-01 PROCEDURE — 94640 AIRWAY INHALATION TREATMENT: CPT

## 2022-01-01 PROCEDURE — 85014 HEMATOCRIT: CPT

## 2022-01-01 PROCEDURE — 6360000002 HC RX W HCPCS: Performed by: STUDENT IN AN ORGANIZED HEALTH CARE EDUCATION/TRAINING PROGRAM

## 2022-01-01 PROCEDURE — 1200000000 HC SEMI PRIVATE

## 2022-01-01 PROCEDURE — 2580000003 HC RX 258: Performed by: INTERNAL MEDICINE

## 2022-01-01 PROCEDURE — 0DBN8ZZ EXCISION OF SIGMOID COLON, VIA NATURAL OR ARTIFICIAL OPENING ENDOSCOPIC: ICD-10-PCS | Performed by: INTERNAL MEDICINE

## 2022-01-01 PROCEDURE — 99223 1ST HOSP IP/OBS HIGH 75: CPT | Performed by: INTERNAL MEDICINE

## 2022-01-01 PROCEDURE — 88305 TISSUE EXAM BY PATHOLOGIST: CPT

## 2022-01-01 PROCEDURE — 31500 INSERT EMERGENCY AIRWAY: CPT | Performed by: INTERNAL MEDICINE

## 2022-01-01 PROCEDURE — 6360000002 HC RX W HCPCS: Performed by: PHYSICIAN ASSISTANT

## 2022-01-01 PROCEDURE — 6360000002 HC RX W HCPCS

## 2022-01-01 PROCEDURE — 97535 SELF CARE MNGMENT TRAINING: CPT

## 2022-01-01 PROCEDURE — 80053 COMPREHEN METABOLIC PANEL: CPT

## 2022-01-01 PROCEDURE — 6360000002 HC RX W HCPCS: Performed by: INTERNAL MEDICINE

## 2022-01-01 PROCEDURE — 2580000003 HC RX 258: Performed by: FAMILY MEDICINE

## 2022-01-01 PROCEDURE — 84484 ASSAY OF TROPONIN QUANT: CPT

## 2022-01-01 PROCEDURE — 87636 SARSCOV2 & INF A&B AMP PRB: CPT

## 2022-01-01 PROCEDURE — 36415 COLL VENOUS BLD VENIPUNCTURE: CPT

## 2022-01-01 PROCEDURE — 3609012400 HC EGD TRANSORAL BIOPSY SINGLE/MULTIPLE: Performed by: INTERNAL MEDICINE

## 2022-01-01 PROCEDURE — 71045 X-RAY EXAM CHEST 1 VIEW: CPT

## 2022-01-01 PROCEDURE — 2700000000 HC OXYGEN THERAPY PER DAY

## 2022-01-01 PROCEDURE — 6360000002 HC RX W HCPCS: Performed by: FAMILY MEDICINE

## 2022-01-01 PROCEDURE — APPNB30 APP NON BILLABLE TIME 0-30 MINS: Performed by: NURSE PRACTITIONER

## 2022-01-01 PROCEDURE — 31500 INSERT EMERGENCY AIRWAY: CPT

## 2022-01-01 PROCEDURE — 3609010600 HC COLONOSCOPY POLYPECTOMY SNARE/COLD BIOPSY: Performed by: INTERNAL MEDICINE

## 2022-01-01 PROCEDURE — 86850 RBC ANTIBODY SCREEN: CPT

## 2022-01-01 PROCEDURE — 94761 N-INVAS EAR/PLS OXIMETRY MLT: CPT

## 2022-01-01 PROCEDURE — 85730 THROMBOPLASTIN TIME PARTIAL: CPT

## 2022-01-01 PROCEDURE — 6360000002 HC RX W HCPCS: Performed by: NURSE PRACTITIONER

## 2022-01-01 PROCEDURE — 36430 TRANSFUSION BLD/BLD COMPNT: CPT

## 2022-01-01 PROCEDURE — 6370000000 HC RX 637 (ALT 250 FOR IP): Performed by: NURSE PRACTITIONER

## 2022-01-01 PROCEDURE — 2500000003 HC RX 250 WO HCPCS: Performed by: FAMILY MEDICINE

## 2022-01-01 PROCEDURE — 97530 THERAPEUTIC ACTIVITIES: CPT

## 2022-01-01 PROCEDURE — 7100000000 HC PACU RECOVERY - FIRST 15 MIN: Performed by: INTERNAL MEDICINE

## 2022-01-01 PROCEDURE — 97116 GAIT TRAINING THERAPY: CPT

## 2022-01-01 PROCEDURE — 51702 INSERT TEMP BLADDER CATH: CPT

## 2022-01-01 PROCEDURE — 93306 TTE W/DOPPLER COMPLETE: CPT

## 2022-01-01 PROCEDURE — 74177 CT ABD & PELVIS W/CONTRAST: CPT

## 2022-01-01 PROCEDURE — 36556 INSERT NON-TUNNEL CV CATH: CPT | Performed by: INTERNAL MEDICINE

## 2022-01-01 PROCEDURE — 93010 ELECTROCARDIOGRAM REPORT: CPT | Performed by: INTERNAL MEDICINE

## 2022-01-01 PROCEDURE — 3700000000 HC ANESTHESIA ATTENDED CARE: Performed by: INTERNAL MEDICINE

## 2022-01-01 PROCEDURE — 99291 CRITICAL CARE FIRST HOUR: CPT | Performed by: INTERNAL MEDICINE

## 2022-01-01 PROCEDURE — 84550 ASSAY OF BLOOD/URIC ACID: CPT

## 2022-01-01 PROCEDURE — 2580000003 HC RX 258: Performed by: NURSE PRACTITIONER

## 2022-01-01 PROCEDURE — 85025 COMPLETE CBC W/AUTO DIFF WBC: CPT

## 2022-01-01 PROCEDURE — 84300 ASSAY OF URINE SODIUM: CPT

## 2022-01-01 PROCEDURE — 84133 ASSAY OF URINE POTASSIUM: CPT

## 2022-01-01 PROCEDURE — 6370000000 HC RX 637 (ALT 250 FOR IP): Performed by: PHYSICIAN ASSISTANT

## 2022-01-01 PROCEDURE — 99285 EMERGENCY DEPT VISIT HI MDM: CPT

## 2022-01-01 PROCEDURE — 86900 BLOOD TYPING SEROLOGIC ABO: CPT

## 2022-01-01 PROCEDURE — 86901 BLOOD TYPING SEROLOGIC RH(D): CPT

## 2022-01-01 PROCEDURE — 0DBF8ZX EXCISION OF RIGHT LARGE INTESTINE, VIA NATURAL OR ARTIFICIAL OPENING ENDOSCOPIC, DIAGNOSTIC: ICD-10-PCS | Performed by: INTERNAL MEDICINE

## 2022-01-01 PROCEDURE — 6360000002 HC RX W HCPCS: Performed by: NURSE ANESTHETIST, CERTIFIED REGISTERED

## 2022-01-01 PROCEDURE — 87449 NOS EACH ORGANISM AG IA: CPT

## 2022-01-01 PROCEDURE — 94002 VENT MGMT INPAT INIT DAY: CPT

## 2022-01-01 PROCEDURE — 83880 ASSAY OF NATRIURETIC PEPTIDE: CPT

## 2022-01-01 PROCEDURE — 82270 OCCULT BLOOD FECES: CPT

## 2022-01-01 PROCEDURE — 82728 ASSAY OF FERRITIN: CPT

## 2022-01-01 PROCEDURE — 97165 OT EVAL LOW COMPLEX 30 MIN: CPT

## 2022-01-01 PROCEDURE — 83605 ASSAY OF LACTIC ACID: CPT

## 2022-01-01 PROCEDURE — 2500000003 HC RX 250 WO HCPCS: Performed by: NURSE ANESTHETIST, CERTIFIED REGISTERED

## 2022-01-01 PROCEDURE — 0BH17EZ INSERTION OF ENDOTRACHEAL AIRWAY INTO TRACHEA, VIA NATURAL OR ARTIFICIAL OPENING: ICD-10-PCS | Performed by: INTERNAL MEDICINE

## 2022-01-01 PROCEDURE — APPSS60 APP SPLIT SHARED TIME 46-60 MINUTES: Performed by: NURSE PRACTITIONER

## 2022-01-01 PROCEDURE — 36592 COLLECT BLOOD FROM PICC: CPT

## 2022-01-01 PROCEDURE — 82803 BLOOD GASES ANY COMBINATION: CPT

## 2022-01-01 PROCEDURE — 99232 SBSQ HOSP IP/OBS MODERATE 35: CPT | Performed by: SURGERY

## 2022-01-01 PROCEDURE — 2580000003 HC RX 258: Performed by: STUDENT IN AN ORGANIZED HEALTH CARE EDUCATION/TRAINING PROGRAM

## 2022-01-01 PROCEDURE — P9016 RBC LEUKOCYTES REDUCED: HCPCS

## 2022-01-01 PROCEDURE — APPSS15 APP SPLIT SHARED TIME 0-15 MINUTES: Performed by: NURSE PRACTITIONER

## 2022-01-01 PROCEDURE — 93005 ELECTROCARDIOGRAM TRACING: CPT | Performed by: INTERNAL MEDICINE

## 2022-01-01 PROCEDURE — C9113 INJ PANTOPRAZOLE SODIUM, VIA: HCPCS | Performed by: FAMILY MEDICINE

## 2022-01-01 PROCEDURE — 99233 SBSQ HOSP IP/OBS HIGH 50: CPT | Performed by: SPECIALIST

## 2022-01-01 PROCEDURE — 36620 INSERTION CATHETER ARTERY: CPT | Performed by: INTERNAL MEDICINE

## 2022-01-01 PROCEDURE — 99292 CRITICAL CARE ADDL 30 MIN: CPT | Performed by: INTERNAL MEDICINE

## 2022-01-01 PROCEDURE — 81003 URINALYSIS AUTO W/O SCOPE: CPT

## 2022-01-01 PROCEDURE — 86923 COMPATIBILITY TEST ELECTRIC: CPT

## 2022-01-01 PROCEDURE — 7100000001 HC PACU RECOVERY - ADDTL 15 MIN: Performed by: INTERNAL MEDICINE

## 2022-01-01 PROCEDURE — 2709999900 HC NON-CHARGEABLE SUPPLY: Performed by: INTERNAL MEDICINE

## 2022-01-01 PROCEDURE — 82436 ASSAY OF URINE CHLORIDE: CPT

## 2022-01-01 PROCEDURE — 96374 THER/PROPH/DIAG INJ IV PUSH: CPT

## 2022-01-01 PROCEDURE — C9113 INJ PANTOPRAZOLE SODIUM, VIA: HCPCS | Performed by: INTERNAL MEDICINE

## 2022-01-01 PROCEDURE — 76770 US EXAM ABDO BACK WALL COMP: CPT

## 2022-01-01 PROCEDURE — 86160 COMPLEMENT ANTIGEN: CPT

## 2022-01-01 PROCEDURE — 31622 DX BRONCHOSCOPE/WASH: CPT | Performed by: INTERNAL MEDICINE

## 2022-01-01 PROCEDURE — 3700000001 HC ADD 15 MINUTES (ANESTHESIA): Performed by: INTERNAL MEDICINE

## 2022-01-01 PROCEDURE — 2000000000 HC ICU R&B

## 2022-01-01 PROCEDURE — 36620 INSERTION CATHETER ARTERY: CPT

## 2022-01-01 PROCEDURE — 3609019800 HC COLONOSCOPY WITH SUBMUCOSAL INJECTION: Performed by: INTERNAL MEDICINE

## 2022-01-01 PROCEDURE — 83550 IRON BINDING TEST: CPT

## 2022-01-01 PROCEDURE — 80076 HEPATIC FUNCTION PANEL: CPT

## 2022-01-01 PROCEDURE — 97161 PT EVAL LOW COMPLEX 20 MIN: CPT

## 2022-01-01 PROCEDURE — 02HV33Z INSERTION OF INFUSION DEVICE INTO SUPERIOR VENA CAVA, PERCUTANEOUS APPROACH: ICD-10-PCS | Performed by: INTERNAL MEDICINE

## 2022-01-01 PROCEDURE — 3609010300 HC COLONOSCOPY W/BIOPSY SINGLE/MULTIPLE: Performed by: INTERNAL MEDICINE

## 2022-01-01 PROCEDURE — 0DBL8ZZ EXCISION OF TRANSVERSE COLON, VIA NATURAL OR ARTIFICIAL OPENING ENDOSCOPIC: ICD-10-PCS | Performed by: INTERNAL MEDICINE

## 2022-01-01 PROCEDURE — 82378 CARCINOEMBRYONIC ANTIGEN: CPT

## 2022-01-01 PROCEDURE — 37799 UNLISTED PX VASCULAR SURGERY: CPT

## 2022-01-01 PROCEDURE — 2500000003 HC RX 250 WO HCPCS

## 2022-01-01 PROCEDURE — 5A1935Z RESPIRATORY VENTILATION, LESS THAN 24 CONSECUTIVE HOURS: ICD-10-PCS | Performed by: INTERNAL MEDICINE

## 2022-01-01 PROCEDURE — C9113 INJ PANTOPRAZOLE SODIUM, VIA: HCPCS | Performed by: STUDENT IN AN ORGANIZED HEALTH CARE EDUCATION/TRAINING PROGRAM

## 2022-01-01 PROCEDURE — 2500000003 HC RX 250 WO HCPCS: Performed by: INTERNAL MEDICINE

## 2022-01-01 PROCEDURE — 85610 PROTHROMBIN TIME: CPT

## 2022-01-01 PROCEDURE — 36556 INSERT NON-TUNNEL CV CATH: CPT

## 2022-01-01 RX ORDER — FUROSEMIDE 10 MG/ML
INJECTION INTRAMUSCULAR; INTRAVENOUS
Status: COMPLETED
Start: 2022-01-01 | End: 2022-01-01

## 2022-01-01 RX ORDER — IPRATROPIUM BROMIDE AND ALBUTEROL SULFATE 2.5; .5 MG/3ML; MG/3ML
1 SOLUTION RESPIRATORY (INHALATION) ONCE
Status: COMPLETED | OUTPATIENT
Start: 2022-01-01 | End: 2022-01-01

## 2022-01-01 RX ORDER — METOPROLOL TARTRATE 50 MG/1
50 TABLET, FILM COATED ORAL 2 TIMES DAILY
Status: DISCONTINUED | OUTPATIENT
Start: 2022-01-01 | End: 2022-01-01 | Stop reason: HOSPADM

## 2022-01-01 RX ORDER — IPRATROPIUM BROMIDE AND ALBUTEROL SULFATE 2.5; .5 MG/3ML; MG/3ML
1 SOLUTION RESPIRATORY (INHALATION) 4 TIMES DAILY
Status: DISCONTINUED | OUTPATIENT
Start: 2022-01-01 | End: 2022-01-01

## 2022-01-01 RX ORDER — ARFORMOTEROL TARTRATE 15 UG/2ML
15 SOLUTION RESPIRATORY (INHALATION) 2 TIMES DAILY
Status: DISCONTINUED | OUTPATIENT
Start: 2022-01-01 | End: 2022-01-01 | Stop reason: HOSPADM

## 2022-01-01 RX ORDER — FUROSEMIDE 20 MG/1
20 TABLET ORAL DAILY
Status: DISCONTINUED | OUTPATIENT
Start: 2022-01-01 | End: 2022-01-01

## 2022-01-01 RX ORDER — ROCURONIUM BROMIDE 10 MG/ML
50 INJECTION, SOLUTION INTRAVENOUS ONCE
Status: COMPLETED | OUTPATIENT
Start: 2022-01-01 | End: 2022-01-01

## 2022-01-01 RX ORDER — FUROSEMIDE 10 MG/ML
40 INJECTION INTRAMUSCULAR; INTRAVENOUS 2 TIMES DAILY
Status: DISCONTINUED | OUTPATIENT
Start: 2022-01-01 | End: 2022-01-01 | Stop reason: HOSPADM

## 2022-01-01 RX ORDER — FUROSEMIDE 10 MG/ML
40 INJECTION INTRAMUSCULAR; INTRAVENOUS ONCE
Status: COMPLETED | OUTPATIENT
Start: 2022-01-01 | End: 2022-01-01

## 2022-01-01 RX ORDER — IPRATROPIUM BROMIDE AND ALBUTEROL SULFATE 2.5; .5 MG/3ML; MG/3ML
1 SOLUTION RESPIRATORY (INHALATION) EVERY 4 HOURS
Status: DISCONTINUED | OUTPATIENT
Start: 2022-01-01 | End: 2022-01-01 | Stop reason: HOSPADM

## 2022-01-01 RX ORDER — PROPOFOL 10 MG/ML
INJECTION, EMULSION INTRAVENOUS PRN
Status: DISCONTINUED | OUTPATIENT
Start: 2022-01-01 | End: 2022-01-01 | Stop reason: SDUPTHER

## 2022-01-01 RX ORDER — ETOMIDATE 2 MG/ML
INJECTION INTRAVENOUS
Status: COMPLETED
Start: 2022-01-01 | End: 2022-01-01

## 2022-01-01 RX ORDER — 0.9 % SODIUM CHLORIDE 0.9 %
1000 INTRAVENOUS SOLUTION INTRAVENOUS ONCE
Status: COMPLETED | OUTPATIENT
Start: 2022-01-01 | End: 2022-01-01

## 2022-01-01 RX ORDER — GLYCOPYRROLATE 0.2 MG/ML
INJECTION INTRAMUSCULAR; INTRAVENOUS PRN
Status: DISCONTINUED | OUTPATIENT
Start: 2022-01-01 | End: 2022-01-01 | Stop reason: SDUPTHER

## 2022-01-01 RX ORDER — ONDANSETRON 4 MG/1
4 TABLET, ORALLY DISINTEGRATING ORAL EVERY 8 HOURS PRN
Status: DISCONTINUED | OUTPATIENT
Start: 2022-01-01 | End: 2022-01-01 | Stop reason: HOSPADM

## 2022-01-01 RX ORDER — ACETAMINOPHEN 650 MG/1
650 SUPPOSITORY RECTAL EVERY 6 HOURS PRN
Status: DISCONTINUED | OUTPATIENT
Start: 2022-01-01 | End: 2022-01-01 | Stop reason: HOSPADM

## 2022-01-01 RX ORDER — IPRATROPIUM BROMIDE AND ALBUTEROL SULFATE 2.5; .5 MG/3ML; MG/3ML
1 SOLUTION RESPIRATORY (INHALATION) EVERY 4 HOURS
Status: DISCONTINUED | OUTPATIENT
Start: 2022-01-01 | End: 2022-01-01

## 2022-01-01 RX ORDER — LABETALOL HYDROCHLORIDE 5 MG/ML
5 INJECTION, SOLUTION INTRAVENOUS EVERY 4 HOURS PRN
Status: DISCONTINUED | OUTPATIENT
Start: 2022-01-01 | End: 2022-01-01

## 2022-01-01 RX ORDER — PANTOPRAZOLE SODIUM 40 MG/1
40 TABLET, DELAYED RELEASE ORAL
Status: DISCONTINUED | OUTPATIENT
Start: 2022-01-01 | End: 2022-01-01

## 2022-01-01 RX ORDER — HYDROCHLOROTHIAZIDE 25 MG/1
25 TABLET ORAL DAILY
COMMUNITY

## 2022-01-01 RX ORDER — METHYLPREDNISOLONE SODIUM SUCCINATE 40 MG/ML
40 INJECTION, POWDER, LYOPHILIZED, FOR SOLUTION INTRAMUSCULAR; INTRAVENOUS EVERY 8 HOURS
Status: DISCONTINUED | OUTPATIENT
Start: 2022-01-01 | End: 2022-01-01

## 2022-01-01 RX ORDER — BUDESONIDE 0.5 MG/2ML
0.5 INHALANT ORAL 2 TIMES DAILY
Status: DISCONTINUED | OUTPATIENT
Start: 2022-01-01 | End: 2022-01-01 | Stop reason: HOSPADM

## 2022-01-01 RX ORDER — FENTANYL CITRATE-0.9 % NACL/PF 10 MCG/ML
25-200 PLASTIC BAG, INJECTION (ML) INTRAVENOUS CONTINUOUS
Status: DISCONTINUED | OUTPATIENT
Start: 2022-01-01 | End: 2022-01-01 | Stop reason: SDUPTHER

## 2022-01-01 RX ORDER — ROCURONIUM BROMIDE 10 MG/ML
INJECTION, SOLUTION INTRAVENOUS
Status: COMPLETED
Start: 2022-01-01 | End: 2022-01-01

## 2022-01-01 RX ORDER — NOREPINEPHRINE BIT/0.9 % NACL 16MG/250ML
1-100 INFUSION BOTTLE (ML) INTRAVENOUS CONTINUOUS
Status: DISCONTINUED | OUTPATIENT
Start: 2022-01-01 | End: 2022-01-01 | Stop reason: HOSPADM

## 2022-01-01 RX ORDER — SODIUM CHLORIDE 9 MG/ML
INJECTION, SOLUTION INTRAVENOUS PRN
Status: DISCONTINUED | OUTPATIENT
Start: 2022-01-01 | End: 2022-01-01 | Stop reason: HOSPADM

## 2022-01-01 RX ORDER — SODIUM CHLORIDE 9 MG/ML
INJECTION, SOLUTION INTRAVENOUS CONTINUOUS
Status: DISCONTINUED | OUTPATIENT
Start: 2022-01-01 | End: 2022-01-01

## 2022-01-01 RX ORDER — FUROSEMIDE 10 MG/ML
20 INJECTION INTRAMUSCULAR; INTRAVENOUS ONCE
Status: COMPLETED | OUTPATIENT
Start: 2022-01-01 | End: 2022-01-01

## 2022-01-01 RX ORDER — GUAIFENESIN 600 MG/1
600 TABLET, EXTENDED RELEASE ORAL 2 TIMES DAILY
Status: DISCONTINUED | OUTPATIENT
Start: 2022-01-01 | End: 2022-01-01 | Stop reason: HOSPADM

## 2022-01-01 RX ORDER — PROPOFOL 10 MG/ML
5-50 INJECTION, EMULSION INTRAVENOUS CONTINUOUS
Status: DISCONTINUED | OUTPATIENT
Start: 2022-01-01 | End: 2022-01-01 | Stop reason: HOSPADM

## 2022-01-01 RX ORDER — PROPOFOL 10 MG/ML
INJECTION, EMULSION INTRAVENOUS
Status: DISPENSED
Start: 2022-01-01 | End: 2022-01-01

## 2022-01-01 RX ORDER — HYDROCHLOROTHIAZIDE 25 MG/1
25 TABLET ORAL DAILY
Status: DISCONTINUED | OUTPATIENT
Start: 2022-01-01 | End: 2022-01-01

## 2022-01-01 RX ORDER — POLYETHYLENE GLYCOL 3350 17 G/17G
17 POWDER, FOR SOLUTION ORAL DAILY PRN
Status: DISCONTINUED | OUTPATIENT
Start: 2022-01-01 | End: 2022-01-01 | Stop reason: HOSPADM

## 2022-01-01 RX ORDER — HYDRALAZINE HYDROCHLORIDE 20 MG/ML
10 INJECTION INTRAMUSCULAR; INTRAVENOUS EVERY 4 HOURS PRN
Status: DISCONTINUED | OUTPATIENT
Start: 2022-01-01 | End: 2022-01-01

## 2022-01-01 RX ORDER — SODIUM CHLORIDE 0.9 % (FLUSH) 0.9 %
5-40 SYRINGE (ML) INJECTION PRN
Status: DISCONTINUED | OUTPATIENT
Start: 2022-01-01 | End: 2022-01-01 | Stop reason: HOSPADM

## 2022-01-01 RX ORDER — ALBUTEROL SULFATE 2.5 MG/3ML
2.5 SOLUTION RESPIRATORY (INHALATION) EVERY 4 HOURS PRN
Status: DISCONTINUED | OUTPATIENT
Start: 2022-01-01 | End: 2022-01-01 | Stop reason: HOSPADM

## 2022-01-01 RX ORDER — PROPOFOL 10 MG/ML
INJECTION, EMULSION INTRAVENOUS CONTINUOUS PRN
Status: DISCONTINUED | OUTPATIENT
Start: 2022-01-01 | End: 2022-01-01 | Stop reason: SDUPTHER

## 2022-01-01 RX ORDER — ONDANSETRON 2 MG/ML
4 INJECTION INTRAMUSCULAR; INTRAVENOUS EVERY 6 HOURS PRN
Status: DISCONTINUED | OUTPATIENT
Start: 2022-01-01 | End: 2022-01-01 | Stop reason: HOSPADM

## 2022-01-01 RX ORDER — LIDOCAINE HYDROCHLORIDE 20 MG/ML
INJECTION, SOLUTION EPIDURAL; INFILTRATION; INTRACAUDAL; PERINEURAL PRN
Status: DISCONTINUED | OUTPATIENT
Start: 2022-01-01 | End: 2022-01-01 | Stop reason: SDUPTHER

## 2022-01-01 RX ORDER — METHYLPREDNISOLONE SODIUM SUCCINATE 125 MG/2ML
125 INJECTION, POWDER, LYOPHILIZED, FOR SOLUTION INTRAMUSCULAR; INTRAVENOUS ONCE
Status: COMPLETED | OUTPATIENT
Start: 2022-01-01 | End: 2022-01-01

## 2022-01-01 RX ORDER — SODIUM CHLORIDE 0.9 % (FLUSH) 0.9 %
5-40 SYRINGE (ML) INJECTION EVERY 12 HOURS SCHEDULED
Status: DISCONTINUED | OUTPATIENT
Start: 2022-01-01 | End: 2022-01-01 | Stop reason: HOSPADM

## 2022-01-01 RX ORDER — ACETAMINOPHEN 325 MG/1
650 TABLET ORAL EVERY 6 HOURS PRN
Status: DISCONTINUED | OUTPATIENT
Start: 2022-01-01 | End: 2022-01-01 | Stop reason: HOSPADM

## 2022-01-01 RX ORDER — ALBUTEROL SULFATE 90 UG/1
2 AEROSOL, METERED RESPIRATORY (INHALATION) EVERY 4 HOURS PRN
Status: DISCONTINUED | OUTPATIENT
Start: 2022-01-01 | End: 2022-01-01

## 2022-01-01 RX ORDER — METHYLPREDNISOLONE SODIUM SUCCINATE 40 MG/ML
40 INJECTION, POWDER, LYOPHILIZED, FOR SOLUTION INTRAMUSCULAR; INTRAVENOUS EVERY 6 HOURS
Status: DISCONTINUED | OUTPATIENT
Start: 2022-01-01 | End: 2022-01-01 | Stop reason: HOSPADM

## 2022-01-01 RX ORDER — CALCIUM CARBONATE 200(500)MG
500 TABLET,CHEWABLE ORAL 3 TIMES DAILY PRN
Status: DISCONTINUED | OUTPATIENT
Start: 2022-01-01 | End: 2022-01-01 | Stop reason: HOSPADM

## 2022-01-01 RX ORDER — AMLODIPINE BESYLATE 5 MG/1
10 TABLET ORAL NIGHTLY
Status: DISCONTINUED | OUTPATIENT
Start: 2022-01-01 | End: 2022-01-01

## 2022-01-01 RX ORDER — ETOMIDATE 2 MG/ML
30 INJECTION INTRAVENOUS ONCE
Status: COMPLETED | OUTPATIENT
Start: 2022-01-01 | End: 2022-01-01

## 2022-01-01 RX ORDER — SODIUM CHLORIDE 9 MG/ML
INJECTION, SOLUTION INTRAVENOUS
Status: COMPLETED
Start: 2022-01-01 | End: 2022-01-01

## 2022-01-01 RX ORDER — ALBUTEROL SULFATE 2.5 MG/3ML
5 SOLUTION RESPIRATORY (INHALATION) ONCE
Status: COMPLETED | OUTPATIENT
Start: 2022-01-01 | End: 2022-01-01

## 2022-01-01 RX ORDER — FUROSEMIDE 10 MG/ML
20 INJECTION INTRAMUSCULAR; INTRAVENOUS ONCE
Status: DISCONTINUED | OUTPATIENT
Start: 2022-01-01 | End: 2022-01-01 | Stop reason: HOSPADM

## 2022-01-01 RX ORDER — PANTOPRAZOLE SODIUM 40 MG/10ML
40 INJECTION, POWDER, LYOPHILIZED, FOR SOLUTION INTRAVENOUS 2 TIMES DAILY
Status: DISCONTINUED | OUTPATIENT
Start: 2022-01-01 | End: 2022-01-01 | Stop reason: HOSPADM

## 2022-01-01 RX ORDER — NOREPINEPHRINE BIT/0.9 % NACL 16MG/250ML
INFUSION BOTTLE (ML) INTRAVENOUS
Status: COMPLETED
Start: 2022-01-01 | End: 2022-01-01

## 2022-01-01 RX ORDER — NOREPINEPHRINE BIT/0.9 % NACL 16MG/250ML
1-100 INFUSION BOTTLE (ML) INTRAVENOUS CONTINUOUS
Status: DISCONTINUED | OUTPATIENT
Start: 2022-01-01 | End: 2022-01-01

## 2022-01-01 RX ADMIN — METHYLPREDNISOLONE SODIUM SUCCINATE 40 MG: 40 INJECTION, POWDER, FOR SOLUTION INTRAMUSCULAR; INTRAVENOUS at 06:33

## 2022-01-01 RX ADMIN — BUDESONIDE 500 MCG: 0.5 SUSPENSION RESPIRATORY (INHALATION) at 21:05

## 2022-01-01 RX ADMIN — IPRATROPIUM BROMIDE AND ALBUTEROL SULFATE 1 AMPULE: .5; 3 SOLUTION RESPIRATORY (INHALATION) at 11:19

## 2022-01-01 RX ADMIN — BUDESONIDE 500 MCG: 0.5 SUSPENSION RESPIRATORY (INHALATION) at 10:30

## 2022-01-01 RX ADMIN — BUDESONIDE 500 MCG: 0.5 SUSPENSION RESPIRATORY (INHALATION) at 08:27

## 2022-01-01 RX ADMIN — Medication 10 ML: at 22:00

## 2022-01-01 RX ADMIN — BUDESONIDE 500 MCG: 0.5 SUSPENSION RESPIRATORY (INHALATION) at 19:58

## 2022-01-01 RX ADMIN — ARFORMOTEROL TARTRATE 15 MCG: 15 SOLUTION RESPIRATORY (INHALATION) at 21:13

## 2022-01-01 RX ADMIN — IRON SUCROSE 200 MG: 20 INJECTION, SOLUTION INTRAVENOUS at 09:23

## 2022-01-01 RX ADMIN — METOPROLOL TARTRATE 50 MG: 50 TABLET ORAL at 10:03

## 2022-01-01 RX ADMIN — ACETAMINOPHEN 650 MG: 325 TABLET ORAL at 01:02

## 2022-01-01 RX ADMIN — AMLODIPINE BESYLATE 10 MG: 5 TABLET ORAL at 20:00

## 2022-01-01 RX ADMIN — ACETAMINOPHEN 650 MG: 325 TABLET ORAL at 05:12

## 2022-01-01 RX ADMIN — METHYLPREDNISOLONE SODIUM SUCCINATE 40 MG: 40 INJECTION, POWDER, FOR SOLUTION INTRAMUSCULAR; INTRAVENOUS at 05:03

## 2022-01-01 RX ADMIN — ARFORMOTEROL TARTRATE 15 MCG: 15 SOLUTION RESPIRATORY (INHALATION) at 20:19

## 2022-01-01 RX ADMIN — PHENYLEPHRINE HYDROCHLORIDE 300 MCG/MIN: 10 INJECTION INTRAVENOUS at 22:04

## 2022-01-01 RX ADMIN — LIDOCAINE HYDROCHLORIDE 100 MG: 20 INJECTION, SOLUTION EPIDURAL; INFILTRATION; INTRACAUDAL; PERINEURAL at 12:22

## 2022-01-01 RX ADMIN — SODIUM CHLORIDE, PRESERVATIVE FREE 10 ML: 5 INJECTION INTRAVENOUS at 07:30

## 2022-01-01 RX ADMIN — Medication 10 ML: at 20:54

## 2022-01-01 RX ADMIN — IPRATROPIUM BROMIDE AND ALBUTEROL SULFATE 1 AMPULE: .5; 3 SOLUTION RESPIRATORY (INHALATION) at 20:07

## 2022-01-01 RX ADMIN — BISACODYL 20 MG: 5 TABLET, COATED ORAL at 08:58

## 2022-01-01 RX ADMIN — METHYLPREDNISOLONE SODIUM SUCCINATE 40 MG: 40 INJECTION, POWDER, FOR SOLUTION INTRAMUSCULAR; INTRAVENOUS at 04:57

## 2022-01-01 RX ADMIN — VASOPRESSIN 0.03 UNITS/MIN: 20 INJECTION INTRAVENOUS at 22:11

## 2022-01-01 RX ADMIN — Medication 25 MCG/HR: at 22:10

## 2022-01-01 RX ADMIN — LABETALOL HYDROCHLORIDE 5 MG: 5 INJECTION INTRAVENOUS at 00:12

## 2022-01-01 RX ADMIN — AMLODIPINE BESYLATE 10 MG: 5 TABLET ORAL at 20:06

## 2022-01-01 RX ADMIN — PANTOPRAZOLE SODIUM 40 MG: 40 INJECTION, POWDER, FOR SOLUTION INTRAVENOUS at 19:29

## 2022-01-01 RX ADMIN — IPRATROPIUM BROMIDE AND ALBUTEROL SULFATE 1 AMPULE: .5; 3 SOLUTION RESPIRATORY (INHALATION) at 10:30

## 2022-01-01 RX ADMIN — IPRATROPIUM BROMIDE AND ALBUTEROL SULFATE 1 AMPULE: .5; 3 SOLUTION RESPIRATORY (INHALATION) at 03:21

## 2022-01-01 RX ADMIN — IPRATROPIUM BROMIDE AND ALBUTEROL SULFATE 1 AMPULE: .5; 3 SOLUTION RESPIRATORY (INHALATION) at 20:20

## 2022-01-01 RX ADMIN — IPRATROPIUM BROMIDE AND ALBUTEROL SULFATE 1 AMPULE: .5; 3 SOLUTION RESPIRATORY (INHALATION) at 04:31

## 2022-01-01 RX ADMIN — IPRATROPIUM BROMIDE AND ALBUTEROL SULFATE 1 AMPULE: .5; 3 SOLUTION RESPIRATORY (INHALATION) at 16:53

## 2022-01-01 RX ADMIN — SODIUM CHLORIDE 8 MG/HR: 9 INJECTION, SOLUTION INTRAVENOUS at 22:24

## 2022-01-01 RX ADMIN — AMLODIPINE BESYLATE 10 MG: 5 TABLET ORAL at 21:57

## 2022-01-01 RX ADMIN — Medication 10 ML: at 22:48

## 2022-01-01 RX ADMIN — SODIUM CHLORIDE: 9 INJECTION, SOLUTION INTRAVENOUS at 16:43

## 2022-01-01 RX ADMIN — IPRATROPIUM BROMIDE AND ALBUTEROL SULFATE 1 AMPULE: .5; 3 SOLUTION RESPIRATORY (INHALATION) at 11:04

## 2022-01-01 RX ADMIN — METOPROLOL TARTRATE 50 MG: 50 TABLET ORAL at 09:53

## 2022-01-01 RX ADMIN — ARFORMOTEROL TARTRATE 15 MCG: 15 SOLUTION RESPIRATORY (INHALATION) at 20:56

## 2022-01-01 RX ADMIN — GUAIFENESIN 600 MG: 600 TABLET, EXTENDED RELEASE ORAL at 09:53

## 2022-01-01 RX ADMIN — ROCURONIUM BROMIDE 50 MG: 10 INJECTION, SOLUTION INTRAVENOUS at 12:53

## 2022-01-01 RX ADMIN — METHYLPREDNISOLONE SODIUM SUCCINATE 40 MG: 40 INJECTION, POWDER, FOR SOLUTION INTRAMUSCULAR; INTRAVENOUS at 12:45

## 2022-01-01 RX ADMIN — METHYLPREDNISOLONE SODIUM SUCCINATE 40 MG: 40 INJECTION, POWDER, FOR SOLUTION INTRAMUSCULAR; INTRAVENOUS at 20:54

## 2022-01-01 RX ADMIN — AMLODIPINE BESYLATE 10 MG: 5 TABLET ORAL at 21:41

## 2022-01-01 RX ADMIN — METOPROLOL TARTRATE 50 MG: 50 TABLET ORAL at 22:44

## 2022-01-01 RX ADMIN — HYDROCHLOROTHIAZIDE 25 MG: 25 TABLET ORAL at 10:02

## 2022-01-01 RX ADMIN — IPRATROPIUM BROMIDE AND ALBUTEROL SULFATE 1 AMPULE: .5; 3 SOLUTION RESPIRATORY (INHALATION) at 17:05

## 2022-01-01 RX ADMIN — METHYLPREDNISOLONE SODIUM SUCCINATE 40 MG: 40 INJECTION, POWDER, FOR SOLUTION INTRAMUSCULAR; INTRAVENOUS at 15:57

## 2022-01-01 RX ADMIN — BUDESONIDE 500 MCG: 0.5 SUSPENSION RESPIRATORY (INHALATION) at 20:56

## 2022-01-01 RX ADMIN — METHYLPREDNISOLONE SODIUM SUCCINATE 40 MG: 40 INJECTION, POWDER, FOR SOLUTION INTRAMUSCULAR; INTRAVENOUS at 06:42

## 2022-01-01 RX ADMIN — METOPROLOL TARTRATE 50 MG: 50 TABLET ORAL at 08:30

## 2022-01-01 RX ADMIN — METHYLPREDNISOLONE SODIUM SUCCINATE 125 MG: 125 INJECTION, POWDER, FOR SOLUTION INTRAMUSCULAR; INTRAVENOUS at 16:44

## 2022-01-01 RX ADMIN — EPINEPHRINE 20 MCG/MIN: 1 INJECTION INTRAMUSCULAR; INTRAVENOUS; SUBCUTANEOUS at 22:07

## 2022-01-01 RX ADMIN — METHYLPREDNISOLONE SODIUM SUCCINATE 40 MG: 40 INJECTION, POWDER, FOR SOLUTION INTRAMUSCULAR; INTRAVENOUS at 13:41

## 2022-01-01 RX ADMIN — METHYLPREDNISOLONE SODIUM SUCCINATE 40 MG: 40 INJECTION, POWDER, FOR SOLUTION INTRAMUSCULAR; INTRAVENOUS at 22:45

## 2022-01-01 RX ADMIN — PANTOPRAZOLE SODIUM 40 MG: 40 TABLET, DELAYED RELEASE ORAL at 16:07

## 2022-01-01 RX ADMIN — METHYLPREDNISOLONE SODIUM SUCCINATE 40 MG: 40 INJECTION, POWDER, FOR SOLUTION INTRAMUSCULAR; INTRAVENOUS at 11:35

## 2022-01-01 RX ADMIN — Medication 5 ML: at 09:53

## 2022-01-01 RX ADMIN — GUAIFENESIN 600 MG: 600 TABLET, EXTENDED RELEASE ORAL at 17:47

## 2022-01-01 RX ADMIN — IPRATROPIUM BROMIDE AND ALBUTEROL SULFATE 1 AMPULE: .5; 2.5 SOLUTION RESPIRATORY (INHALATION) at 11:45

## 2022-01-01 RX ADMIN — METHYLPREDNISOLONE SODIUM SUCCINATE 40 MG: 40 INJECTION, POWDER, FOR SOLUTION INTRAMUSCULAR; INTRAVENOUS at 12:01

## 2022-01-01 RX ADMIN — BUDESONIDE 500 MCG: 0.5 SUSPENSION RESPIRATORY (INHALATION) at 08:12

## 2022-01-01 RX ADMIN — IPRATROPIUM BROMIDE AND ALBUTEROL SULFATE 1 AMPULE: .5; 3 SOLUTION RESPIRATORY (INHALATION) at 08:19

## 2022-01-01 RX ADMIN — METHYLPREDNISOLONE SODIUM SUCCINATE 40 MG: 40 INJECTION, POWDER, FOR SOLUTION INTRAMUSCULAR; INTRAVENOUS at 20:05

## 2022-01-01 RX ADMIN — IPRATROPIUM BROMIDE AND ALBUTEROL SULFATE 1 AMPULE: .5; 3 SOLUTION RESPIRATORY (INHALATION) at 21:13

## 2022-01-01 RX ADMIN — IPRATROPIUM BROMIDE AND ALBUTEROL SULFATE 1 AMPULE: .5; 3 SOLUTION RESPIRATORY (INHALATION) at 08:27

## 2022-01-01 RX ADMIN — IPRATROPIUM BROMIDE AND ALBUTEROL SULFATE 1 AMPULE: .5; 3 SOLUTION RESPIRATORY (INHALATION) at 23:53

## 2022-01-01 RX ADMIN — PANTOPRAZOLE SODIUM 40 MG: 40 TABLET, DELAYED RELEASE ORAL at 06:16

## 2022-01-01 RX ADMIN — BUDESONIDE 500 MCG: 0.5 SUSPENSION RESPIRATORY (INHALATION) at 21:13

## 2022-01-01 RX ADMIN — Medication 25 MCG/HR: at 12:11

## 2022-01-01 RX ADMIN — METHYLPREDNISOLONE SODIUM SUCCINATE 40 MG: 40 INJECTION, POWDER, FOR SOLUTION INTRAMUSCULAR; INTRAVENOUS at 13:45

## 2022-01-01 RX ADMIN — POLYETHYLENE GLYCOL-3350 AND ELECTROLYTES 4000 ML: 236; 6.74; 5.86; 2.97; 22.74 POWDER, FOR SOLUTION ORAL at 16:52

## 2022-01-01 RX ADMIN — IRON SUCROSE 200 MG: 20 INJECTION, SOLUTION INTRAVENOUS at 10:13

## 2022-01-01 RX ADMIN — ARFORMOTEROL TARTRATE 15 MCG: 15 SOLUTION RESPIRATORY (INHALATION) at 08:27

## 2022-01-01 RX ADMIN — Medication 10 ML: at 21:41

## 2022-01-01 RX ADMIN — IPRATROPIUM BROMIDE AND ALBUTEROL SULFATE 1 AMPULE: .5; 3 SOLUTION RESPIRATORY (INHALATION) at 00:30

## 2022-01-01 RX ADMIN — SODIUM CHLORIDE: 9 INJECTION, SOLUTION INTRAVENOUS at 22:00

## 2022-01-01 RX ADMIN — ARFORMOTEROL TARTRATE 15 MCG: 15 SOLUTION RESPIRATORY (INHALATION) at 20:07

## 2022-01-01 RX ADMIN — IPRATROPIUM BROMIDE AND ALBUTEROL SULFATE 1 AMPULE: .5; 3 SOLUTION RESPIRATORY (INHALATION) at 11:53

## 2022-01-01 RX ADMIN — ROCURONIUM BROMIDE 50 MG: 10 INJECTION, SOLUTION INTRAVENOUS at 10:27

## 2022-01-01 RX ADMIN — METOPROLOL TARTRATE 50 MG: 50 TABLET ORAL at 21:58

## 2022-01-01 RX ADMIN — IPRATROPIUM BROMIDE AND ALBUTEROL SULFATE 1 AMPULE: .5; 3 SOLUTION RESPIRATORY (INHALATION) at 07:58

## 2022-01-01 RX ADMIN — PANTOPRAZOLE SODIUM 40 MG: 40 TABLET, DELAYED RELEASE ORAL at 16:52

## 2022-01-01 RX ADMIN — IPRATROPIUM BROMIDE AND ALBUTEROL SULFATE 1 AMPULE: .5; 3 SOLUTION RESPIRATORY (INHALATION) at 16:13

## 2022-01-01 RX ADMIN — IPRATROPIUM BROMIDE AND ALBUTEROL SULFATE 1 AMPULE: .5; 3 SOLUTION RESPIRATORY (INHALATION) at 21:05

## 2022-01-01 RX ADMIN — ACETAMINOPHEN 650 MG: 325 TABLET ORAL at 00:39

## 2022-01-01 RX ADMIN — BUDESONIDE 500 MCG: 0.5 SUSPENSION RESPIRATORY (INHALATION) at 20:28

## 2022-01-01 RX ADMIN — PANTOPRAZOLE SODIUM 40 MG: 40 TABLET, DELAYED RELEASE ORAL at 06:42

## 2022-01-01 RX ADMIN — BUDESONIDE 500 MCG: 0.5 SUSPENSION RESPIRATORY (INHALATION) at 20:07

## 2022-01-01 RX ADMIN — IPRATROPIUM BROMIDE AND ALBUTEROL SULFATE 1 AMPULE: .5; 3 SOLUTION RESPIRATORY (INHALATION) at 08:12

## 2022-01-01 RX ADMIN — PROPOFOL 20 MCG/KG/MIN: 10 INJECTION, EMULSION INTRAVENOUS at 15:49

## 2022-01-01 RX ADMIN — METOPROLOL TARTRATE 50 MG: 50 TABLET ORAL at 09:22

## 2022-01-01 RX ADMIN — METOPROLOL TARTRATE 50 MG: 50 TABLET ORAL at 20:54

## 2022-01-01 RX ADMIN — PANTOPRAZOLE SODIUM 40 MG: 40 TABLET, DELAYED RELEASE ORAL at 18:39

## 2022-01-01 RX ADMIN — ONDANSETRON 4 MG: 2 INJECTION INTRAMUSCULAR; INTRAVENOUS at 22:44

## 2022-01-01 RX ADMIN — Medication 10 ML: at 10:04

## 2022-01-01 RX ADMIN — PHENYLEPHRINE HYDROCHLORIDE 300 MCG/MIN: 10 INJECTION INTRAVENOUS at 00:54

## 2022-01-01 RX ADMIN — Medication 2 PUFF: at 17:36

## 2022-01-01 RX ADMIN — IPRATROPIUM BROMIDE AND ALBUTEROL SULFATE 1 AMPULE: .5; 3 SOLUTION RESPIRATORY (INHALATION) at 23:48

## 2022-01-01 RX ADMIN — SODIUM CHLORIDE 25 ML: 9 INJECTION, SOLUTION INTRAVENOUS at 08:41

## 2022-01-01 RX ADMIN — FUROSEMIDE 20 MG: 10 INJECTION, SOLUTION INTRAMUSCULAR; INTRAVENOUS at 14:01

## 2022-01-01 RX ADMIN — FUROSEMIDE 40 MG: 10 INJECTION, SOLUTION INTRAMUSCULAR; INTRAVENOUS at 10:28

## 2022-01-01 RX ADMIN — BUDESONIDE 500 MCG: 0.5 SUSPENSION RESPIRATORY (INHALATION) at 07:58

## 2022-01-01 RX ADMIN — IPRATROPIUM BROMIDE AND ALBUTEROL SULFATE 1 AMPULE: .5; 3 SOLUTION RESPIRATORY (INHALATION) at 03:33

## 2022-01-01 RX ADMIN — VASOPRESSIN 0.03 UNITS/MIN: 20 INJECTION INTRAVENOUS at 14:09

## 2022-01-01 RX ADMIN — IPRATROPIUM BROMIDE AND ALBUTEROL SULFATE 1 AMPULE: .5; 3 SOLUTION RESPIRATORY (INHALATION) at 16:19

## 2022-01-01 RX ADMIN — METHYLPREDNISOLONE SODIUM SUCCINATE 40 MG: 40 INJECTION, POWDER, FOR SOLUTION INTRAMUSCULAR; INTRAVENOUS at 01:02

## 2022-01-01 RX ADMIN — Medication 3 MCG/MIN: at 10:49

## 2022-01-01 RX ADMIN — METOPROLOL TARTRATE 50 MG: 50 TABLET ORAL at 08:59

## 2022-01-01 RX ADMIN — BUDESONIDE 500 MCG: 0.5 SUSPENSION RESPIRATORY (INHALATION) at 08:19

## 2022-01-01 RX ADMIN — ARFORMOTEROL TARTRATE 15 MCG: 15 SOLUTION RESPIRATORY (INHALATION) at 19:58

## 2022-01-01 RX ADMIN — IPRATROPIUM BROMIDE AND ALBUTEROL SULFATE 1 AMPULE: .5; 3 SOLUTION RESPIRATORY (INHALATION) at 23:59

## 2022-01-01 RX ADMIN — METHYLPREDNISOLONE SODIUM SUCCINATE 40 MG: 40 INJECTION, POWDER, FOR SOLUTION INTRAMUSCULAR; INTRAVENOUS at 12:08

## 2022-01-01 RX ADMIN — ARFORMOTEROL TARTRATE 15 MCG: 15 SOLUTION RESPIRATORY (INHALATION) at 08:12

## 2022-01-01 RX ADMIN — Medication 10 ML: at 19:29

## 2022-01-01 RX ADMIN — PHENYLEPHRINE HYDROCHLORIDE 30 MCG/MIN: 10 INJECTION INTRAVENOUS at 15:40

## 2022-01-01 RX ADMIN — Medication 10 ML: at 01:04

## 2022-01-01 RX ADMIN — SODIUM CHLORIDE 8 MG/HR: 9 INJECTION, SOLUTION INTRAVENOUS at 06:31

## 2022-01-01 RX ADMIN — Medication 10 ML: at 21:25

## 2022-01-01 RX ADMIN — GLYCOPYRROLATE 0.2 MG: 0.2 INJECTION INTRAMUSCULAR; INTRAVENOUS at 12:22

## 2022-01-01 RX ADMIN — IOPAMIDOL 75 ML: 755 INJECTION, SOLUTION INTRAVENOUS at 18:38

## 2022-01-01 RX ADMIN — IPRATROPIUM BROMIDE AND ALBUTEROL SULFATE 1 AMPULE: .5; 3 SOLUTION RESPIRATORY (INHALATION) at 20:28

## 2022-01-01 RX ADMIN — IPRATROPIUM BROMIDE AND ALBUTEROL SULFATE 1 AMPULE: .5; 3 SOLUTION RESPIRATORY (INHALATION) at 20:57

## 2022-01-01 RX ADMIN — IPRATROPIUM BROMIDE AND ALBUTEROL SULFATE 1 AMPULE: .5; 3 SOLUTION RESPIRATORY (INHALATION) at 16:43

## 2022-01-01 RX ADMIN — FUROSEMIDE 40 MG: 10 INJECTION, SOLUTION INTRAMUSCULAR; INTRAVENOUS at 19:29

## 2022-01-01 RX ADMIN — SODIUM CHLORIDE: 9 INJECTION, SOLUTION INTRAVENOUS at 16:31

## 2022-01-01 RX ADMIN — ARFORMOTEROL TARTRATE 15 MCG: 15 SOLUTION RESPIRATORY (INHALATION) at 21:04

## 2022-01-01 RX ADMIN — ARFORMOTEROL TARTRATE 15 MCG: 15 SOLUTION RESPIRATORY (INHALATION) at 11:19

## 2022-01-01 RX ADMIN — PHENYLEPHRINE HYDROCHLORIDE 300 MCG/MIN: 10 INJECTION INTRAVENOUS at 19:14

## 2022-01-01 RX ADMIN — METHYLPREDNISOLONE SODIUM SUCCINATE 40 MG: 40 INJECTION, POWDER, FOR SOLUTION INTRAMUSCULAR; INTRAVENOUS at 19:29

## 2022-01-01 RX ADMIN — PANTOPRAZOLE SODIUM 40 MG: 40 TABLET, DELAYED RELEASE ORAL at 15:59

## 2022-01-01 RX ADMIN — ALBUTEROL SULFATE 5 MG: 2.5 SOLUTION RESPIRATORY (INHALATION) at 16:53

## 2022-01-01 RX ADMIN — ONDANSETRON 4 MG: 2 INJECTION INTRAMUSCULAR; INTRAVENOUS at 01:02

## 2022-01-01 RX ADMIN — IPRATROPIUM BROMIDE AND ALBUTEROL SULFATE 1 AMPULE: .5; 3 SOLUTION RESPIRATORY (INHALATION) at 15:26

## 2022-01-01 RX ADMIN — ETOMIDATE 30 MG: 2 INJECTION, SOLUTION INTRAVENOUS at 10:26

## 2022-01-01 RX ADMIN — SODIUM CHLORIDE 1000 ML: 9 INJECTION, SOLUTION INTRAVENOUS at 20:20

## 2022-01-01 RX ADMIN — PANTOPRAZOLE SODIUM 40 MG: 40 TABLET, DELAYED RELEASE ORAL at 15:12

## 2022-01-01 RX ADMIN — SODIUM CHLORIDE: 9 INJECTION, SOLUTION INTRAVENOUS at 06:31

## 2022-01-01 RX ADMIN — PROPOFOL 150 MCG/KG/MIN: 10 INJECTION, EMULSION INTRAVENOUS at 12:23

## 2022-01-01 RX ADMIN — PROPOFOL 40 MG: 10 INJECTION, EMULSION INTRAVENOUS at 12:23

## 2022-01-01 RX ADMIN — FUROSEMIDE 20 MG: 10 INJECTION INTRAMUSCULAR; INTRAVENOUS at 14:01

## 2022-01-01 RX ADMIN — METHYLPREDNISOLONE SODIUM SUCCINATE 40 MG: 40 INJECTION, POWDER, FOR SOLUTION INTRAMUSCULAR; INTRAVENOUS at 06:28

## 2022-01-01 RX ADMIN — ARFORMOTEROL TARTRATE 15 MCG: 15 SOLUTION RESPIRATORY (INHALATION) at 07:57

## 2022-01-01 RX ADMIN — ARFORMOTEROL TARTRATE 15 MCG: 15 SOLUTION RESPIRATORY (INHALATION) at 08:19

## 2022-01-01 RX ADMIN — IPRATROPIUM BROMIDE AND ALBUTEROL SULFATE 1 AMPULE: .5; 2.5 SOLUTION RESPIRATORY (INHALATION) at 15:21

## 2022-01-01 RX ADMIN — ARFORMOTEROL TARTRATE 15 MCG: 15 SOLUTION RESPIRATORY (INHALATION) at 20:28

## 2022-01-01 RX ADMIN — PIPERACILLIN AND TAZOBACTAM 3375 MG: 3; .375 INJECTION, POWDER, LYOPHILIZED, FOR SOLUTION INTRAVENOUS at 15:47

## 2022-01-01 RX ADMIN — Medication 10 ML: at 08:30

## 2022-01-01 RX ADMIN — EPINEPHRINE 20 MCG/MIN: 1 INJECTION INTRAMUSCULAR; INTRAVENOUS; SUBCUTANEOUS at 21:57

## 2022-01-01 RX ADMIN — Medication 10 ML: at 20:06

## 2022-01-01 RX ADMIN — SODIUM CHLORIDE: 9 INJECTION, SOLUTION INTRAVENOUS at 13:17

## 2022-01-01 RX ADMIN — BUDESONIDE 500 MCG: 0.5 SUSPENSION RESPIRATORY (INHALATION) at 20:20

## 2022-01-01 RX ADMIN — IPRATROPIUM BROMIDE AND ALBUTEROL SULFATE 1 AMPULE: .5; 3 SOLUTION RESPIRATORY (INHALATION) at 12:07

## 2022-01-01 RX ADMIN — IRON SUCROSE 200 MG: 20 INJECTION, SOLUTION INTRAVENOUS at 08:43

## 2022-01-01 RX ADMIN — MAGNESIUM CITRATE 296 ML: 1.75 LIQUID ORAL at 09:00

## 2022-01-01 RX ADMIN — GUAIFENESIN 600 MG: 600 TABLET, EXTENDED RELEASE ORAL at 21:41

## 2022-01-01 RX ADMIN — AMLODIPINE BESYLATE 10 MG: 5 TABLET ORAL at 21:25

## 2022-01-01 RX ADMIN — Medication 10 ML: at 10:34

## 2022-01-01 RX ADMIN — BUDESONIDE 500 MCG: 0.5 SUSPENSION RESPIRATORY (INHALATION) at 11:19

## 2022-01-01 RX ADMIN — METOPROLOL TARTRATE 50 MG: 50 TABLET ORAL at 20:06

## 2022-01-01 RX ADMIN — ARFORMOTEROL TARTRATE 15 MCG: 15 SOLUTION RESPIRATORY (INHALATION) at 10:30

## 2022-01-01 RX ADMIN — METHYLPREDNISOLONE SODIUM SUCCINATE 40 MG: 40 INJECTION, POWDER, FOR SOLUTION INTRAMUSCULAR; INTRAVENOUS at 21:25

## 2022-01-01 RX ADMIN — PANTOPRAZOLE SODIUM 40 MG: 40 TABLET, DELAYED RELEASE ORAL at 05:00

## 2022-01-01 RX ADMIN — METOPROLOL TARTRATE 50 MG: 50 TABLET ORAL at 01:02

## 2022-01-01 RX ADMIN — AMLODIPINE BESYLATE 10 MG: 5 TABLET ORAL at 22:44

## 2022-01-01 RX ADMIN — IPRATROPIUM BROMIDE AND ALBUTEROL SULFATE 1 AMPULE: .5; 3 SOLUTION RESPIRATORY (INHALATION) at 19:58

## 2022-01-01 RX ADMIN — METOPROLOL TARTRATE 50 MG: 50 TABLET ORAL at 08:08

## 2022-01-01 RX ADMIN — Medication 50 MCG/MIN: at 16:12

## 2022-01-01 RX ADMIN — METHYLPREDNISOLONE SODIUM SUCCINATE 40 MG: 40 INJECTION, POWDER, FOR SOLUTION INTRAMUSCULAR; INTRAVENOUS at 00:52

## 2022-01-01 RX ADMIN — METHYLPREDNISOLONE SODIUM SUCCINATE 40 MG: 40 INJECTION, POWDER, FOR SOLUTION INTRAMUSCULAR; INTRAVENOUS at 07:30

## 2022-01-01 RX ADMIN — METOPROLOL TARTRATE 50 MG: 50 TABLET ORAL at 21:41

## 2022-01-01 RX ADMIN — Medication 10 ML: at 09:00

## 2022-01-01 RX ADMIN — FUROSEMIDE 20 MG: 20 TABLET ORAL at 11:27

## 2022-01-01 RX ADMIN — IPRATROPIUM BROMIDE AND ALBUTEROL SULFATE 1 AMPULE: .5; 3 SOLUTION RESPIRATORY (INHALATION) at 04:05

## 2022-01-01 RX ADMIN — IPRATROPIUM BROMIDE AND ALBUTEROL SULFATE 1 AMPULE: .5; 3 SOLUTION RESPIRATORY (INHALATION) at 15:58

## 2022-01-01 RX ADMIN — SODIUM CHLORIDE, PRESERVATIVE FREE 10 ML: 5 INJECTION INTRAVENOUS at 06:42

## 2022-01-01 RX ADMIN — IPRATROPIUM BROMIDE AND ALBUTEROL SULFATE 1 AMPULE: .5; 3 SOLUTION RESPIRATORY (INHALATION) at 03:36

## 2022-01-01 RX ADMIN — METOPROLOL TARTRATE 50 MG: 50 TABLET ORAL at 21:25

## 2022-01-01 RX ADMIN — SODIUM CHLORIDE 8 MG/HR: 9 INJECTION, SOLUTION INTRAVENOUS at 20:54

## 2022-01-01 RX ADMIN — METHYLPREDNISOLONE SODIUM SUCCINATE 40 MG: 40 INJECTION, POWDER, FOR SOLUTION INTRAMUSCULAR; INTRAVENOUS at 18:05

## 2022-01-01 RX ADMIN — AMLODIPINE BESYLATE 10 MG: 5 TABLET ORAL at 20:54

## 2022-01-01 RX ADMIN — SODIUM CHLORIDE 80 MG: 9 INJECTION, SOLUTION INTRAVENOUS at 21:51

## 2022-01-01 RX ADMIN — IPRATROPIUM BROMIDE AND ALBUTEROL SULFATE 1 AMPULE: .5; 3 SOLUTION RESPIRATORY (INHALATION) at 23:40

## 2022-01-01 RX ADMIN — PANTOPRAZOLE SODIUM 40 MG: 40 TABLET, DELAYED RELEASE ORAL at 08:08

## 2022-01-01 ASSESSMENT — PAIN SCALES - GENERAL
PAINLEVEL_OUTOF10: 0
PAINLEVEL_OUTOF10: 3
PAINLEVEL_OUTOF10: 0
PAINLEVEL_OUTOF10: 6
PAINLEVEL_OUTOF10: 0
PAINLEVEL_OUTOF10: 6

## 2022-01-01 ASSESSMENT — PAIN DESCRIPTION - FREQUENCY
FREQUENCY: CONTINUOUS

## 2022-01-01 ASSESSMENT — PAIN DESCRIPTION - PAIN TYPE
TYPE: ACUTE PAIN

## 2022-01-01 ASSESSMENT — PAIN DESCRIPTION - LOCATION
LOCATION: CHEST
LOCATION: ABDOMEN
LOCATION: ABDOMEN

## 2022-01-01 ASSESSMENT — PULMONARY FUNCTION TESTS
PIF_VALUE: 39
PIF_VALUE: 39
PIF_VALUE: 29
PIF_VALUE: 40
PIF_VALUE: 39
PIF_VALUE: 39

## 2022-01-01 ASSESSMENT — PAIN DESCRIPTION - ONSET
ONSET: ON-GOING

## 2022-01-01 ASSESSMENT — PAIN - FUNCTIONAL ASSESSMENT
PAIN_FUNCTIONAL_ASSESSMENT: ACTIVITIES ARE NOT PREVENTED

## 2022-01-01 ASSESSMENT — PAIN DESCRIPTION - DESCRIPTORS
DESCRIPTORS: SORE
DESCRIPTORS: DISCOMFORT
DESCRIPTORS: SORE

## 2022-01-01 ASSESSMENT — PAIN DESCRIPTION - ORIENTATION
ORIENTATION: MID

## 2022-01-01 ASSESSMENT — ENCOUNTER SYMPTOMS
SHORTNESS OF BREATH: 0
SHORTNESS OF BREATH: 0

## 2022-06-11 PROBLEM — K92.2 GI HEMORRHAGE: Status: ACTIVE | Noted: 2022-01-01

## 2022-06-11 NOTE — ED PROVIDER NOTES
In addition to the advanced practice provider, I personally saw Nba Jacobs and performed a substantive portion of the visit including all aspects of the medical decision making. Medical Decision Making  Presents for evaluation of shortness of breath and fatigue. She does have history of COPD but this is not improving with her breathing treatments. Does use 2 L nasal cannula at home. She recently completed outpatient antibiotics from urgent care. She is denying dark or tarry stool. she is not on blood thinner therapy. On exam she is comfortable appearing. Somewhat pale. Diffuse expiratory wheezing in all lung fields. Satting appropriately on baseline 2 L nasal cannula. Able to speak in full sentences. Tachycardic regular rhythm. Screenings  Is this patient to be included in the SEP-1 Core Measure due to severe sepsis or septic shock? No   Exclusion criteria - the patient is NOT to be included for SEP-1 Core Measure due to: Infection is not suspected     [place] Coma Scale  Eye Opening: Spontaneous  Best Verbal Response: Oriented  Best Motor Response: Obeys commands  [place] Coma Scale Score: 15         XR CHEST PORTABLE   Final Result   Chronic findings in the chest without acute airspace disease identified.            Labs Reviewed   CBC WITH AUTO DIFFERENTIAL - Abnormal; Notable for the following components:       Result Value    RBC 2.87 (*)     Hemoglobin 6.2 (*)     Hematocrit 20.8 (*)     MCV 72.4 (*)     MCH 21.6 (*)     MCHC 29.9 (*)     RDW 16.5 (*)     Platelets 52 (*)     MPV 14.6 (*)     Lymphocytes Absolute 0.7 (*)     All other components within normal limits    Narrative:     CALL  Barboza  [facility] tel. [phone],  Hematology results called to and read back by LILO Pascal, 06/11/2022  17:05, by JALYN CARDOZA JR. [facility]   COMPREHENSIVE METABOLIC PANEL W/ REFLEX TO MG FOR LOW K - Abnormal; Notable for the following components:    Chloride 96 (*)     Glucose 135 (*)     BUN 25 (*) CREATININE 1.4 (*)     GFR Non- 36 (*)     GFR  43 (*)     All other components within normal limits   BRAIN NATRIURETIC PEPTIDE - Abnormal; Notable for the following components:    Pro-BNP 1,159 (*)     All other components within normal limits   BLOOD OCCULT STOOL DIAGNOSTIC - Abnormal; Notable for the following components:    Occult Blood Diagnostic   (*)     Value: Result: POSITIVE  Normal range: Negative      All other components within normal limits    Narrative:     ORDER#: [id number]                          ORDERED BY: Julia Chino  SOURCE: Stool                              COLLECTED:  06/11/22 17:57  ANTIBIOTICS AT CYNDY.:                      RECEIVED :  06/11/22 18:04   COVID-19 & INFLUENZA COMBO   TROPONIN   FERRITIN   IRON AND TIBC   HEMOGLOBIN AND HEMATOCRIT   CBC   BASIC METABOLIC PANEL   HEMOGLOBIN AND HEMATOCRIT   TYPE AND SCREEN   PREPARE RBC (CROSSMATCH)     Medications   0.9 % sodium chloride infusion (has no administration in time range)   pantoprazole (PROTONIX) 80 mg in sodium chloride 0.9 % 100 mL bolus (80 mg IntraVENous New Bag 6/11/22 2151)   pantoprazole (PROTONIX) 80 mg in sodium chloride 0.9 % 100 mL infusion (has no administration in time range)   0.9 % sodium chloride infusion ( IntraVENous New Bag 6/11/22 2200)   albuterol sulfate HFA (PROVENTIL;VENTOLIN;PROAIR) 108 (90 Base) MCG/ACT inhaler 2 puff (has no administration in time range)   sodium chloride flush 0.9 % injection 5-40 mL (has no administration in time range)   sodium chloride flush 0.9 % injection 5-40 mL (has no administration in time range)   0.9 % sodium chloride infusion (has no administration in time range)   ondansetron (ZOFRAN-ODT) disintegrating tablet 4 mg (has no administration in time range)     Or   ondansetron (ZOFRAN) injection 4 mg (has no administration in time range)   polyethylene glycol (GLYCOLAX) packet 17 g (has no administration in time range)   acetaminophen (TYLENOL) tablet 650 mg (has no administration in time range)     Or   acetaminophen (TYLENOL) suppository 650 mg (has no administration in time range)   hydroCHLOROthiazide (HYDRODIURIL) tablet 25 mg (has no administration in time range)   amLODIPine (NORVASC) tablet 10 mg (10 mg Oral Given 6/11/22 2157)   metoprolol tartrate (LOPRESSOR) tablet 50 mg (50 mg Oral Given 6/11/22 2158)   labetalol (NORMODYNE;TRANDATE) injection 5 mg (has no administration in time range)   methylPREDNISolone sodium (SOLU-MEDROL) injection 125 mg (125 mg IntraVENous Given 6/11/22 1644)   ipratropium-albuterol (DUONEB) nebulizer solution 1 ampule (1 ampule Inhalation Given 6/11/22 1653)   albuterol (PROVENTIL) nebulizer solution 5 mg (5 mg Nebulization Given 6/11/22 1653)   I have discussed with the patient the rationale for blood component transfusion; its benefits in treating or preventing fatigue, organ damage, or death; and its risk which includes mild transfusion reactions, rare risk of blood borne infection, or more serious but rare reactions. I have discussed the alternatives to transfusion, including the risk and consequences of not receiving transfusion. The patient had an opportunity to ask questions and had agreed to proceed with transfusion of blood components. Patient is 69-year-old female presenting for evaluation of fatigue and shortness of breath. She arrives hemodynamically stable, somewhat hypertensive. She is diffusely wheezy on exam and breathing treatments were initiated with suspicion for COPD exacerbation. IV Solu-Medrol also given. She does feel somewhat improved however her blood work has returned back for significant acute anemia. Hemoccult is positive. She is not on blood thinners for reversal.  She has never had EGD. She will require inpatient admission for likely upper GI bleed. PPI bolus and drip were initiated as well as 1 unit PRBCs for acute anemia less than 7.   Her blood pressure remained stable and she did not have any active bleeding in the emergency room. She is agreeable to admission today for further treatment of the above. The total Critical Care time is 34 minutes which excludes separately billable procedures. Patient Referrals:  No follow-up provider specified. Discharge Medications:  Current Discharge Medication List          FINAL IMPRESSION  1. Blood loss anemia    2. Hypoxia    3. COPD exacerbation (HCC)        Blood pressure (!) 173/53, pulse (!) 104, temperature 98 °F (36.7 °C), temperature source Oral, resp. rate 20, SpO2 96 %, not currently breastfeeding.      For further details of [facility] emergency department encounter, please see documentation by advanced practice provider      Mp Bruno MD  06/11/22 1773

## 2022-06-11 NOTE — ED NOTES
Report called to Omar Nunez RN.   Patient is leaving with Blood going by kaushik Brown RN  06/11/22 1922

## 2022-06-11 NOTE — ED NOTES
Pt. Is  A hard stick but stable so no additional IVs obtained.   On left wrist (+) hematoma from previous IV attempt     Tacho Holliday RN  06/11/22 5430

## 2022-06-11 NOTE — ED PROVIDER NOTES
like she has been wheezing more at home. Has not been vaccinated for COVID or flu. Nursing Notes were all reviewed and agreed with or any disagreements were addressed in the HPI. REVIEW OF SYSTEMS    (2-9 systems for level 4, 10 or more for level 5)     Review of Systems    Positives and Pertinent negatives as per HPI. Except as noted above in the ROS, all other systems were reviewed and negative. PAST MEDICAL HISTORY     Past Medical History:   Diagnosis Date    Cataracts, bilateral     Cervical cancer screening 2000    Nml per pt'    COPD (chronic obstructive pulmonary disease) ([facility] [address]     Portable oxygen:2 L/min    Gall stones     H/O colonoscopy 1990    polyp    H/O mammogram 2013    Nml per pt'.  Hypertension 2010    Morbid obesity with BMI of 40.0-44.9, adult ([facility] [address] 9/22/2020         SURGICAL HISTORY     Past Surgical History:   Procedure Laterality Date    COLON SURGERY  1990    colon cancer:s/p surgery per pt'         CURRENTMEDICATIONS       Previous Medications    ALBUTEROL SULFATE  (90 BASE) MCG/ACT INHALER    Inhale 2 puffs into the lungs every 4 hours as needed for Wheezing or Shortness of Breath    AMLODIPINE (NORVASC) 10 MG TABLET    TAKE 1 TABLET BY MOUTH DAILY    FLUTICASONE-SALMETEROL (ADVAIR DISKUS) 250-50 MCG/DOSE AEPB    Inhale 1 puff into the lungs every 12 hours    KETOCONAZOLE (NIZORAL) 2 % CREAM    Apply topically daily Apply topically daily.     METOPROLOL TARTRATE (LOPRESSOR) 25 MG TABLET    Take 1 tablet by mouth 2 times daily    SPIRIVA HANDIHALER 18 MCG INHALATION CAPSULE    INHALE 1 CAPSULE INTO THE LUNGS DAILY USING THE HANDIHALER         ALLERGIES     Latex, Banana, Azithromycin, and Codeine    FAMILYHISTORY       Family History   Problem Relation Age of Onset    Heart Disease Father     No Known Problems Mother     Cancer Sister     Arrhythmia Brother     No Known Problems Maternal Grandmother     No Known Problems Maternal Grandfather     No Known Problems Paternal Grandmother     No Known Problems Paternal Grandfather     No Known Problems Other     Cancer Sister     Cancer Brother     Cancer Brother           SOCIAL HISTORY       Social History     Tobacco Use    Smoking status: Former Smoker     Packs/day: 1.00     Years: 30.00     Pack years: 30.00     Types: Cigarettes     Quit date: 10/14/1995     Years since quittin.6    Smokeless tobacco: Never Used   Vaping Use    Vaping Use: Never used   Substance Use Topics    Alcohol use: No    Drug use: No       SCREENINGS             PHYSICAL EXAM    (up to 7 for level 4, 8 or more for level 5)     ED Triage Vitals [22 1614]   BP Temp Temp Source Heart Rate Resp SpO2 Height Weight   (!) 164/45 97.9 °F (36.6 °C) Oral (!) 101 30 96 % -- --       Physical Exam  Vitals and nursing note reviewed. Constitutional:       Appearance: She is well-developed. She is not diaphoretic. HENT:      Head: Atraumatic. Nose: Nose normal.   Eyes:      General:         Right eye: No discharge. Left eye: No discharge. Cardiovascular:      Rate and Rhythm: Regular rhythm. Tachycardia present. Heart sounds: No murmur heard. No friction rub. No gallop. Pulmonary:      Effort: No respiratory distress. Breath sounds: No stridor. Wheezing present. No rhonchi or rales. Comments: Mild tachypnea with decreased breath sounds bilaterally and wheezing bilaterally. No retractions or accessory muscle use. Abdominal:      General: Bowel sounds are normal. There is no distension. Palpations: Abdomen is soft. There is no mass. Tenderness: There is no abdominal tenderness. There is no guarding or rebound. Hernia: No hernia is present. Musculoskeletal:         General: No swelling. Normal range of motion. Cervical back: Normal range of motion. Right lower leg: No edema. Left lower leg: Edema present.       Comments: 1+ pretibial and pedal pitting edema in the left leg. No pitting edema appreciated in the right leg. Skin:     General: Skin is warm and dry. Findings: No erythema or rash. Neurological:      Mental Status: She is alert and oriented to person, place, and time. Cranial Nerves: No cranial nerve deficit. Psychiatric:         Behavior: Behavior normal.         DIAGNOSTIC RESULTS   LABS:    Labs Reviewed   CBC WITH AUTO DIFFERENTIAL - Abnormal; Notable for the following components:       Result Value    RBC 2.87 (*)     Hemoglobin 6.2 (*)     Hematocrit 20.8 (*)     MCV 72.4 (*)     MCH 21.6 (*)     MCHC 29.9 (*)     RDW 16.5 (*)     Platelets 52 (*)     MPV 14.6 (*)     Lymphocytes Absolute 0.7 (*)     All other components within normal limits    Narrative:     CALL  Barboza  [facility] tel. [phone],  Hematology results called to and read back by LILO Rogers, 06/11/2022  17:05, [facility]   COMPREHENSIVE METABOLIC PANEL W/ REFLEX TO MG FOR LOW K - Abnormal; Notable for the following components:    Chloride 96 (*)     Glucose 135 (*)     BUN 25 (*)     CREATININE 1.4 (*)     GFR Non- 36 (*)     GFR  43 (*)     All other components within normal limits   BRAIN NATRIURETIC PEPTIDE - Abnormal; Notable for the following components:    Pro-BNP 1,159 (*)     All other components within normal limits   BLOOD OCCULT STOOL DIAGNOSTIC - Abnormal; Notable for the following components:    Occult Blood Diagnostic   (*)     Value: Result: POSITIVE  Normal range: Negative      All other components within normal limits    Narrative:     ORDER#: [id number]                          ORDERED BY: Jessica Pope  SOURCE: Stool                              COLLECTED:  06/11/22 17:57  ANTIBIOTICS AT CYNDY.:                      RECEIVED :  06/11/22 18:04   COVID-19 & INFLUENZA COMBO   TROPONIN   FERRITIN   IRON AND TIBC   TYPE AND SCREEN   PREPARE RBC (CROSSMATCH)       When ordered only abnormal lab results are displayed.  All other labs were within normal range or not returned as of this dictation. EKG: When ordered, EKG's are interpreted by the Emergency Department Physician in the absence of a cardiologist.  Please see their note for interpretation of EKG. RADIOLOGY:   Non-plain film images such as CT, Ultrasound and MRI are read by the radiologist. Plain radiographic images are visualized and preliminarily interpreted by the ED Provider with the below findings:        Interpretation per the Radiologist below, if available at the time of this note:    XR CHEST PORTABLE   Final Result   Chronic findings in the chest without acute airspace disease identified. No results found. PROCEDURES   Unless otherwise noted below, none     Procedures    CRITICAL CARE TIME       CONSULTS:  IP CONSULT TO GI      EMERGENCY DEPARTMENT COURSE and DIFFERENTIAL DIAGNOSIS/MDM:   Vitals:    Vitals:    06/11/22 1614 06/11/22 1632 06/11/22 1658   BP: (!) 164/45 (!) 157/99    Pulse: (!) 101 100    Resp: 30 20    Temp: 97.9 °F (36.6 °C)     TempSrc: Oral     SpO2: 96% 97% 96%       Patient was given the following medications:  Medications   0.9 % sodium chloride infusion (has no administration in time range)   pantoprazole (PROTONIX) 80 mg in sodium chloride 0.9 % 100 mL bolus (has no administration in time range)   pantoprazole (PROTONIX) 80 mg in sodium chloride 0.9 % 100 mL infusion (has no administration in time range)   methylPREDNISolone sodium (SOLU-MEDROL) injection 125 mg (125 mg IntraVENous Given 6/11/22 1644)   ipratropium-albuterol (DUONEB) nebulizer solution 1 ampule (1 ampule Inhalation Given 6/11/22 1653)   albuterol (PROVENTIL) nebulizer solution 5 mg (5 mg Nebulization Given 6/11/22 1653)         Is this patient to be included in the SEP-1 Core Measure due to severe sepsis or septic shock? No   Exclusion criteria - the patient is NOT to be included for SEP-1 Core Measure due to:   Infection is not suspected    I have discussed with the patient the rationale for blood component transfusion; its benefits in treating or preventing fatigue, organ damage, or death; and its risk which includes mild transfusion reactions, rare risk of blood borne infection, or more serious but rare reactions. I have discussed the alternatives to transfusion, including the risk and consequences of not receiving transfusion. The patient had an opportunity to ask questions and had agreed to proceed with transfusion of blood components. Patient presented with some general fatigue and shortness of breath. Presented hypoxic on 2 L that she is chronically on satting at 89% with some tachypnea and wheezing. She does have some swelling in her left leg but after further questioning she has had multiple ultrasounds of this leg in the past that she states she has not had DVT or any history of clots in. She was found to have a hemoglobin of 6.2 and rectal exam was performed that revealed no gross melena or hematochezia but is guaiac positive. She was typed and crossed for 1 unit. Her wheezing has improved with breathing treatments here. Heart rate is also improved. Suspect viral upper respiratory infection with COPD exacerbation and blood loss anemia. Do not believe imaging is warranted at this time to look for pulmonary embolus that she has had already imaging of her leg in the past there is revealed no DVT and she denies any further swelling in this leg and she is improving with nebulizer treatments during a time with IV contrast shortage. Would not anticoagulate her at this time anyways given her having guaiac positive with low hemoglobin. Patient was stable time of admission. FINAL IMPRESSION      1. Blood loss anemia    2. Hypoxia    3. [facility]          DISPOSITION/PLAN   DISPOSITION Admitted 06/11/2022 06:38:21 PM      PATIENT REFERRED TO:  No follow-up provider specified.     DISCHARGE MEDICATIONS:  New Prescriptions    No medications on file DISCONTINUED MEDICATIONS:  Discontinued Medications    No medications on file              (Please note that portions of this note were completed with a voice recognition program.  Efforts were made to edit the dictations but occasionally words are mis-transcribed.)    Gem Harris PA-C (electronically signed)            Gem Harris PA-C  06/11/22 1567

## 2022-06-11 NOTE — ED NOTES
I stayed in room with patient monitoring vitals for the first 15 minutes of the transfusion.  VSS stable and Patient had no adverse reaction     Marleen Brown RN  06/11/22 7406

## 2022-06-12 NOTE — PROGRESS NOTES
Patient head to toe completed. Patient stated she felt weak. Patient family at bedside. Patient stated she will be happy to stay at hospital as long as needed until she feels better. Rea Yissle given today without complications. HGB continues to trend downward. Waiting on additonal lab results.

## 2022-06-12 NOTE — PROGRESS NOTES
Pt admitted to 5T from ER. Oriented to room, call light and POC. 1 unit PRBC infusing that was starting in ED. Large bruising/hematoma noted to L hand from failed PIV access in ED, was reported by ED RN. Neuro check negative on LUE. PPI bolus and gtt waiting to be hung until 1 unit PRBC finished per ED RN. Family at bedside. Call light within reach, will continue to monitor.

## 2022-06-12 NOTE — PROGRESS NOTES
Lab called to draw H&H stat as when writer spoke to lab, they were going to come back and redraw around 0430. Lab states that this was moved to 0830 but will send someone to draw now.

## 2022-06-12 NOTE — PROGRESS NOTES
Hospitalist Progress Note      PCP: Melissa Holt MD    Date of Admission: 6/11/2022    Chief Complaint: Shortness of breath    Hospital Course: Lauren Díaz is a 79 y.o. female with h/o HTN , COPD, Anemia , chronic respiratory failure, Colectomy, colon CA  presented with c/o dyspnea and fatigue. Symptoms are worsening over the past couple of weeks. She was seen at the urgent care and was prescribed antibiotics. Denies melena. No recent Endoscopy. Labs revealed hb 6.2 dropped from 12.6 since 2020. Stool is Guaiac positive. Neg Influenza and COVID swab. No for further work-up and treatment. Subjective: Patient sitting up in bed, states she still more short of breath than normal but feels better. Has not noticed any blood in her stool, but does not seem to check it regularly. Able to more blood if needed. Awaiting GI evaluation. Baseline O2 requirements 2 L, currently on 3-1/2 L. Reviewed JUN. Agreeable to IV iron. Denies chest pain palpitation abdominal pain nausea vomiting headache lightheadedness dizziness. Reviewed plan of care, Micronase questions. Assessment/Plan:    Acute GI bleed  -Likely upper, patient thinks she has had some dark stool  -Stool occult positive  -Continue Protonix drip  -Clear liquids  -Oral anticoagulation  -GI consulted    Acute blood loss anemia  -Hemoglobin 6.2 on admission. Did improve to 8.1 after 1 unit of blood, but down to 7.4 this morning.  -H&H every 6 hours  -I have discussed with the patient the rationale for blood component transfusion; its benefits in treating or preventing fatigue, organ damage, or death; and its risk which includes mild transfusion reactions, rare risk of blood borne infection, or more serious but rare reactions. I have discussed the alternatives to transfusion, including the risk and consequences of not receiving transfusion.  The patient had an opportunity to ask questions and had agreed to proceed with transfusion of blood components.     Acute COPD exacerbation  -Add DuoNebs, Pulmicort, Brovana nebs  -Solu-Medrol 40 mg IV every 8 hour, already received loading dose in the ER  -Continue O2  -Check respiratory panel  -Flu and COVID swabs negative    Acute on chronic hypoxic respiratory failure  -Secondary to COPD exacerbation  -Baseline O2 requirements 2 L, currently on 3 and half liters  -Wean O2 as able    Recent epistaxis  -Home O2 is not humidified, social work consult to help arrange this for home.  -Asked nursing to humidify O2 while in hospital    Controlled hypertension  -Continue Norvasc and metoprolol  -Holding hydrochlorothiazide for JUN    JUN  -Baseline creatinine 0.9, admission 1.4, today 1.3  -Normal saline 100 mils an hour  -Avoid nephrotoxic medications  -Hold hydrochlorothiazide    Iron deficiency anemia  -Iron level 18  -Venofer 200 mg IV x3 doses      Active Hospital Problems    Diagnosis     GI hemorrhage [K92.2]      Priority: Medium       Medications:  Reviewed    Infusion Medications    sodium chloride      pantoprazole 8 mg/hr (06/12/22 0631)    sodium chloride 100 mL/hr at 06/12/22 0631    sodium chloride       Scheduled Medications    iron sucrose (VENOFER) iv piggyback 100 mL (Admin over 60 minutes)  200 mg IntraVENous Q24H    ipratropium-albuterol  1 ampule Inhalation 4x daily    budesonide  0.5 mg Nebulization BID    Arformoterol Tartrate  15 mcg Nebulization BID    methylPREDNISolone  40 mg IntraVENous Q8H    sodium chloride flush  5-40 mL IntraVENous 2 times per day    amLODIPine  10 mg Oral Nightly    metoprolol tartrate  50 mg Oral BID     PRN Meds: sodium chloride, albuterol sulfate HFA, sodium chloride flush, sodium chloride, ondansetron **OR** ondansetron, polyethylene glycol, acetaminophen **OR** acetaminophen, labetalol      Intake/Output Summary (Last 24 hours) at 6/12/2022 1308  Last data filed at 6/12/2022 0539  Gross per 24 hour   Intake 1416.65 ml   Output --   Net 1416.65 ml Physical Exam Performed:    BP (!) 148/72   Pulse 81   Temp 98.2 °F (36.8 °C) (Oral)   Resp 18   Ht 5' 2\" (1.575 m)   Wt 177 lb 3.2 oz (80.4 kg)   SpO2 93%   BMI 32.41 kg/m²     General appearance: No apparent distress, appears stated age and cooperative. HEENT: Pupils equal, round, and reactive to light. Conjunctivae/corneas clear. Neck: Supple, with full range of motion. No jugular venous distention. Trachea midline. Respiratory: No with conversation. Expiratory wheezes bilaterally throughout. Cardiovascular: Regular rate and rhythm with normal S1/S2 without murmurs, rubs or gallops. Abdomen: Soft, non-tender, non-distended with normal bowel sounds. Musculoskeletal: No clubbing, cyanosis or edema bilaterally. Full range of motion without deformity. Skin: Skin color, texture, turgor normal.  No rashes or lesions. Neurologic:  Neurovascularly intact without any focal sensory/motor deficits. Cranial nerves: II-XII intact, grossly non-focal.  Psychiatric: Alert and oriented, thought content appropriate, normal insight  Capillary Refill: Brisk,< 3 seconds   Peripheral Pulses: +2 palpable, equal bilaterally       Labs:   Recent Labs     06/11/22  1640 06/11/22  2205 06/12/22  0643   WBC 6.5  --  4.6   HGB 6.2* 8.1* 7.4*   HCT 20.8* 26.3* 24.3*   PLT 52*  --  50*     Recent Labs     06/11/22  1640 06/12/22  0643    139   K 3.7 3.7   CL 96* 99   CO2 32 31   BUN 25* 23*   CREATININE 1.4* 1.3*   CALCIUM 9.6 9.3     Recent Labs     06/11/22  1640   AST 25   ALT 16   BILITOT 0.4   ALKPHOS 111     No results for input(s): INR in the last 72 hours.   Recent Labs     06/11/22  1640   TROPONINI <0.01       Urinalysis:      Lab Results   Component Value Date    NITRU Negative 09/18/2020    WBCUA 61 09/18/2020    BACTERIA 4+ 09/18/2020    RBCUA 2 09/18/2020    BLOODU Negative 09/18/2020    SPECGRAV 1.016 09/18/2020    GLUCOSEU Negative 09/18/2020       Radiology:  XR CHEST PORTABLE   Final Result Chronic findings in the chest without acute airspace disease identified. DVT Prophylaxis: SCDs  Diet: ADULT DIET; Clear Liquid  Code Status: Full Code    PT/OT Eval Status: Eval ordered    Dispo -home once medically stable    SHAN Mason CNP      NOTE:  This report was transcribed using voice recognition software. Every effort was made to ensure accuracy; however, inadvertent computerized transcription errors may be present.

## 2022-06-12 NOTE — H&P
has a past surgical history that includes Colon surgery (1990). Medications:  No current facility-administered medications on file prior to encounter. Current Outpatient Medications on File Prior to Encounter   Medication Sig Dispense Refill    hydroCHLOROthiazide (HYDRODIURIL) 25 MG tablet Take 25 mg by mouth daily      fluticasone-salmeterol (ADVAIR DISKUS) 250-50 MCG/DOSE AEPB Inhale 1 puff into the lungs every 12 hours      albuterol sulfate  (90 BASE) MCG/ACT inhaler Inhale 2 puffs into the lungs every 4 hours as needed for Wheezing or Shortness of Breath 1 Inhaler 11    amLODIPine (NORVASC) 10 MG tablet TAKE 1 TABLET BY MOUTH DAILY 30 tablet 0    SPIRIVA HANDIHALER 18 MCG inhalation capsule INHALE 1 CAPSULE INTO THE LUNGS DAILY USING THE HANDIHALER 30 capsule 0    metoprolol tartrate (LOPRESSOR) 25 MG tablet Take 1 tablet by mouth 2 times daily (Patient taking differently: Take 50 mg by mouth 2 times daily ) 60 tablet 1    ketoconazole (NIZORAL) 2 % cream Apply topically daily Apply topically daily.        Current Facility-Administered Medications   Medication Dose Route Frequency Provider Last Rate Last Admin    0.9 % sodium chloride infusion   IntraVENous PRN Edith Harry PA-C        pantoprazole (PROTONIX) 80 mg in sodium chloride 0.9 % 100 mL bolus  80 mg IntraVENous Once Nataliia Botello MD        pantoprazole (PROTONIX) 80 mg in sodium chloride 0.9 % 100 mL infusion  8 mg/hr IntraVENous Continuous Anju Lutz MD        0.9 % sodium chloride infusion   IntraVENous Continuous Mandy Campoverde MD        fluticasone-salmeterol (ADVAIR) 250-50 MCG/DOSE AEPB 1 puff  1 puff Inhalation Q12H Mandy Campoverde MD        albuterol sulfate HFA (PROVENTIL;VENTOLIN;PROAIR) 108 (90 Base) MCG/ACT inhaler 2 puff  2 puff Inhalation Q4H PRN Mandy Campoverde MD        sodium chloride flush 0.9 % injection 5-40 mL  5-40 mL IntraVENous 2 times per day Mandy Campoverde MD        sodium chloride flush 0.9 % injection 5-40 mL  5-40 mL IntraVENous PRN Mandy Campoverde MD        0.9 % sodium chloride infusion   IntraVENous PRN Mandy Campoverde MD        ondansetron (ZOFRAN-ODT) disintegrating tablet 4 mg  4 mg Oral Q8H PRN Mandy Campoverde MD        Or    ondansetron (ZOFRAN) injection 4 mg  4 mg IntraVENous Q6H PRN Mandy Campoverde MD        polyethylene glycol (GLYCOLAX) packet 17 g  17 g Oral Daily PRN Mandy Campoverde MD        acetaminophen (TYLENOL) tablet 650 mg  650 mg Oral Q6H PRN Mandy Campoverde MD        Or   Hoda Nolasco acetaminophen (TYLENOL) suppository 650 mg  650 mg Rectal Q6H PRN MD Hoda Almontehmlissy Langston ON 6/12/2022] hydroCHLOROthiazide (HYDRODIURIL) tablet 25 mg  25 mg Oral Daily Mandy Campoverde MD        amLODIPine (NORVASC) tablet 10 mg  10 mg Oral Nightly Mandy Campoverde MD        metoprolol tartrate (LOPRESSOR) tablet 50 mg  50 mg Oral BID Mandy Campoverde MD        labetalol (NORMODYNE;TRANDATE) injection 5 mg  5 mg IntraVENous Q4H PRN Mandy Campoverde MD         Allergies: Allergies   Allergen Reactions    Latex      Skin turns red    Banana     Azithromycin     Codeine         Social History:  Patient Lives    reports that she quit smoking about 26 years ago. Her smoking use included cigarettes. She has a 30.00 pack-year smoking history. She has never used smokeless tobacco. She reports that she does not drink alcohol and does not use drugs. Family History:  family history includes Arrhythmia in her brother; Cancer in her brother, brother, sister, and sister; Heart Disease in her father; No Known Problems in her maternal grandfather, maternal grandmother, mother, paternal grandfather, paternal grandmother, and another family member. ,     Physical Exam:  BP (!) 189/66   Pulse (!) 107   Temp 98.3 °F (36.8 °C) (Oral)   Resp 20   SpO2 98%     General appearance:  Appears comfortable. Well nourished  Eyes: Sclera clear, pupils equal  ENT: Moist mucus membranes, no thrush.  Trachea midline. Cardiovascular: Regular rhythm, normal S1, S2. No murmur, gallop, rub. No edema in lower extremities  Respiratory: Clear to auscultation bilaterally, no wheeze, good inspiratory effort  Gastrointestinal: Abdomen soft, non-tender, not distended, normal bowel sounds  Musculoskeletal: No cyanosis in digits, neck supple  Neurology: Cranial nerves grossly intact. Alert and oriented in time, place and person. No speech or motor deficits  Psychiatry: Appropriate affect. Not agitated  Skin: Warm, dry, normal turgor, no rash  Brisk capillary refill, peripheral pulses palpable   Labs:  CBC:   Lab Results   Component Value Date    WBC 6.5 06/11/2022    RBC 2.87 06/11/2022    HGB 6.2 06/11/2022    HCT 20.8 06/11/2022    MCV 72.4 06/11/2022    MCH 21.6 06/11/2022    MCHC 29.9 06/11/2022    RDW 16.5 06/11/2022    PLT 52 06/11/2022    MPV 14.6 06/11/2022     BMP:    Lab Results   Component Value Date     06/11/2022    K 3.7 06/11/2022    CL 96 06/11/2022    CO2 32 06/11/2022    BUN 25 06/11/2022    CREATININE 1.4 06/11/2022    CALCIUM 9.6 06/11/2022    GFRAA 43 06/11/2022    GFRAA >60 05/29/2013    LABGLOM 36 06/11/2022    GLUCOSE 135 06/11/2022     XR CHEST PORTABLE   Final Result   Chronic findings in the chest without acute airspace disease identified. Chest Xray:   EKG:    I visualized CXR images and EKG strips    Discussed case  with     Problem List  Principal Problem:    GI hemorrhage  Resolved Problems:    * No resolved hospital problems.  *        Assessment/Plan:   Gi hemorrhage   Stool is Guaiac positive  On PPI gtt  Keep on clears  Hold anticoagulation   GI consulted    Anemia hb 6.2  Dropped from 12.6 since 2020  Getting a unit of PRBC  Cont to monitor h/h every 6 hrs  Transfuse to keep hb > 7  Cont fluids    Acute on chronic respiratory failure  On 4 l o2 here     HTN uncontrolled  Cont Norvasc, HcTZ and metoprolol  Added prn labetalol      DVT prophylaxis   Code status   Diet   IV access Newman Catheter    Admit as inpatient. I anticipate hospitalization spanning more than two midnights for investigation and treatment of the above medically necessary diagnoses. Please note that some part of this chart was generated using Dragon dictation software. Although every effort was made to ensure the accuracy of this automated transcription, some errors in transcription may have occurred inadvertently. If you may need any clarification, please do not hesitate to contact me through [facility].        Nilsa Bunch MD    6/11/2022 8:58 PM

## 2022-06-12 NOTE — PROGRESS NOTES
Notified RT, due to patient de-sating down to 76 during evening vitals. This RN had patient cough and deep breath. O2 came up to 85. Called RT to give PRN respiratory treatment. Notified Christelle Orozco RN, who notified Travis Bustamante CNP. RT came and gave PRN breathing treatment. Patient now on 7 liters HFNC. On continuous pulse ox.

## 2022-06-12 NOTE — CONSULTS
Consultation Note    Patient Name: Shar Locke  : 1940  Age: 79 y.o. Admitting Physician: Mia Wilkinson MD   Date of Admission: 2022  4:13 PM   Primary Care Physician: Radha Barron MD        Shar Locke is being seen at the request of Mia Wilkinson MD for symptomatic anemia. History of Present Illness:  Zaid Sandoval is a pleasant 50-year-old patient with history of hypertension, COPD, home oxygen dependent, admitted with significant dyspnea exacerbation currently on 4 L O2 nasal cannula, GI consulted for anemia evaluation. Patient has prior history of colon cancer underwent right hemicolectomy in the , reports follow-up colonoscopies but no report available. Patient denies any melena, denies rectal bleeding. Denies abdominal pain or change in bowel habits. Stool was heme positive. There was acute drop in hemoglobin. Last hemoglobin was 12.6 g/dL in , admission hemoglobin was 6.2. Patient denies using any anticoagulation. Does have low platelets chronically, she is not aware of prior hematology evaluation for this. Currently receiving IV Venofer as she was noted to be iron deficient. She admits to using ibuprofen periodically for headaches and also endorses nosebleeds for which she uses a spray. She has prior history of gallbladder disease and has intermittent right upper quadrant pain, has an intact gallbladder. GI History:  Right colon cancer status post right hemicolectomy, follow-up colonoscopy report not available    Past Medical History:  Past Medical History:   Diagnosis Date    Cataracts, bilateral     Cervical cancer screening     Nml per pt'    COPD (chronic obstructive pulmonary disease) ([facility] [address]     Portable oxygen:2 L/min    Gall stones     H/O colonoscopy     polyp    H/O mammogram     Nml per pt'.     Hypertension     Morbid obesity with BMI of 40.0-44.9, adult ([address] 2020        Past Surgical History:  Past Surgical History:   Procedure Laterality Date    COLON SURGERY  1990    colon cancer:s/p surgery per pt'        Historical Medications:  Prior to Visit Medications    Medication Sig Taking? Authorizing Provider   hydroCHLOROthiazide (HYDRODIURIL) 25 MG tablet Take 25 mg by mouth daily Yes Historical Provider, MD   fluticasone-salmeterol (ADVAIR DISKUS) 250-50 MCG/DOSE AEPB Inhale 1 puff into the lungs every 12 hours  Historical Provider, MD   albuterol sulfate  (90 BASE) MCG/ACT inhaler Inhale 2 puffs into the lungs every 4 hours as needed for Wheezing or Shortness of Breath  Carolyn Ruiz MD   amLODIPine (NORVASC) 10 MG tablet TAKE 1 TABLET BY MOUTH DAILY  Lee Fernandez MD   SPIRIVA HANDIHALER 18 MCG inhalation capsule INHALE 1 CAPSULE INTO THE LUNGS DAILY USING THE HANDIHALER  Shalom Fernandez MD   metoprolol tartrate (LOPRESSOR) 25 MG tablet Take 1 tablet by mouth 2 times daily  Patient taking differently: Take 50 mg by mouth daily   Lee Fernandez MD   ketoconazole (NIZORAL) 2 % cream Apply topically daily Apply topically daily.   Historical Provider, MD        [facility] Medications:  Current Facility-Administered Medications: iron sucrose (VENOFER) 200 mg in sodium chloride 0.9 % 100 mL IVPB, 200 mg, IntraVENous, Q24H  ipratropium-albuterol (DUONEB) nebulizer solution 1 ampule, 1 ampule, Inhalation, 4x daily  budesonide (PULMICORT) nebulizer suspension 500 mcg, 0.5 mg, Nebulization, BID  Arformoterol Tartrate (BROVANA) nebulizer solution 15 mcg, 15 mcg, Nebulization, BID  methylPREDNISolone sodium (SOLU-MEDROL) injection 40 mg, 40 mg, IntraVENous, Q8H  0.9 % sodium chloride infusion, , IntraVENous, PRN  pantoprazole (PROTONIX) 80 mg in sodium chloride 0.9 % 100 mL infusion, 8 mg/hr, IntraVENous, Continuous  0.9 % sodium chloride infusion, , IntraVENous, Continuous  albuterol sulfate HFA (PROVENTIL;VENTOLIN;PROAIR) 108 (90 Base) MCG/ACT inhaler 2 puff, 2 puff, Inhalation, Q4H [employer]    [phone].  Also available via [employer]

## 2022-06-12 NOTE — PROGRESS NOTES
4 Eyes Skin Assessment     NAME:  Samira Holcomb  YOB: 1940  MEDICAL RECORD NUMBER:  [id number]    The patient is being assess for  Admission    I agree that 2 RN's have performed a thorough Head to Toe Skin Assessment on the patient. ALL assessment sites listed below have been assessed. Areas assessed by both nurses:    Head, Face, Ears, Shoulders, Back, Chest, Arms, Elbows, Hands, Sacrum. Buttock, Coccyx, Ischium and Legs. Feet and Heels        Does the Patient have a Wound?  No noted wound(s)       Vargas Prevention initiated:  No   Wound Care Orders initiated:  No    Pressure Injury (Stage 3,4, Unstageable, DTI, NWPT, and Complex wounds) if present place consult order under [de-identified] No    New and Established Ostomies if present place consult order under : No      Nurse 1 eSignature: Electronically signed by Michelle Chris RN on 6/11/22 at 11:47 PM EDT    **SHARE this note so that the co-signing nurse is able to place an eSignature**    Nurse 2 eSignature: Electronically signed by Kang Brown RN on 6/11/22 at 11:59 PM EDT

## 2022-06-13 NOTE — PROGRESS NOTES
STAFF  I have personally performed a face to face diagnostic evaluation on this patient. I have interviewed and examined the patient and I agree with the findings and recommended plan of care. In summary, my findings and plan are the following: As above, elderly woman with COPD exacerbation and iron def anemia without gross bleeding. History of colon resectoin for CRC. Will plan EGD and colonoscopy when pulm function returns close to baseline - perhaps prior to discharge.     [facility], [address] [place] [address]  6/13/2022

## 2022-06-13 NOTE — CONSULTS
MD Eileen Fritz MD Veneda Napoleon, MD                                  Office: [phone]                 Fax: [phone]          [facility]                     NEPHROLOGY CONSULTATION NOTE:     PATIENT NAME: Tatyana Burgos  : 1940  MRN: [id number]      Name:  Tatyana Burgos Date/Time of Admission: 2022  4:13 PM    CSN: [id number] Attending Provider: Naomi Mendez MD   Room/Bed: [facility]82/0058-57 : 1940 Age: 79 y.o. Reason for Nephrology consult :  Evaluation of patient with elevated creatinine            History of Presenting complaint:       Tatyana Burgos 79 y.o. Has been admitted with multiple complaints of generalized weakness. Patient is being evaluated for possible GI bleed. No history of hypotension  She is noted to have a hemoglobin of 7.4 on admission. Nephrology consult for elevated creatinine of 1.4 with a BUN of 25. She is not the best historian. Patient reports decreased urine output. She also complains of currently micturition but appears to be a bladder dysfunction problem. No previous history of chronic kidney disease. .  She has a history of well-controlled hypertension. Her medications include amlodipine and Lopressor. Medications reviewed. Medical records reviewed. Past Medical History :         Past Medical History:   Diagnosis Date    Cataracts, bilateral     Cervical cancer screening     Nml per pt'    COPD (chronic obstructive pulmonary disease) ([facility] [address]     Portable oxygen:2 L/min    Gall stones     H/O colonoscopy     polyp    H/O mammogram     Nml per pt'.  Hypertension 2010    Morbid obesity with BMI of 40.0-44.9, adult ([facility] [address] 2020       Medications  Which i have  Reviewed, also have reviewed home medications  Prior to Admission medications    Medication Sig Start Date End Date Taking?  Authorizing Provider   hydroCHLOROthiazide (HYDRODIURIL) 25 MG tablet Take 25 mg by mouth daily   Yes Historical Provider, MD   fluticasone-salmeterol (ADVAIR DISKUS) 250-50 MCG/DOSE AEPB Inhale 1 puff into the lungs every 12 hours    Historical Provider, MD   albuterol sulfate  (90 BASE) MCG/ACT inhaler Inhale 2 puffs into the lungs every 4 hours as needed for Wheezing or Shortness of Breath 5/15/17   Norris Mirza MD   amLODIPine (NORVASC) 10 MG tablet TAKE 1 TABLET BY MOUTH DAILY 3/17/17   MD Ginny Fajardo [place] 18 MCG inhalation capsule INHALE 1 CAPSULE INTO THE LUNGS DAILY USING THE HANDIHALER 3/17/17   Mojgan Conklin MD   metoprolol tartrate (LOPRESSOR) 25 MG tablet Take 1 tablet by mouth 2 times daily  Patient taking differently: Take 50 mg by mouth daily  10/14/16   Mojgan Conklin MD   ketoconazole (NIZORAL) 2 % cream Apply topically daily Apply topically daily.     Historical Provider, MD     Current Facility-Administered Medications: 0.9 % sodium chloride infusion, , IntraVENous, PRN  pantoprazole (PROTONIX) tablet 40 mg, 40 mg, Oral, BID AC  iron sucrose (VENOFER) 200 mg in sodium chloride 0.9 % 100 mL IVPB, 200 mg, IntraVENous, Q24H  budesonide (PULMICORT) nebulizer suspension 500 mcg, 0.5 mg, Nebulization, BID  Arformoterol Tartrate (BROVANA) nebulizer solution 15 mcg, 15 mcg, Nebulization, BID  methylPREDNISolone sodium (SOLU-MEDROL) injection 40 mg, 40 mg, IntraVENous, Q8H  albuterol (PROVENTIL) nebulizer solution 2.5 mg, 2.5 mg, Nebulization, Q4H PRN  ipratropium-albuterol (DUONEB) nebulizer solution 1 ampule, 1 ampule, Inhalation, Q4H  0.9 % sodium chloride infusion, , IntraVENous, PRN  sodium chloride flush 0.9 % injection 5-40 mL, 5-40 mL, IntraVENous, 2 times per day  sodium chloride flush 0.9 % injection 5-40 mL, 5-40 mL, IntraVENous, PRN  0.9 % sodium chloride infusion, , IntraVENous, PRN  ondansetron (ZOFRAN-ODT) disintegrating tablet 4 mg, 4 mg, Oral, Q8H PRN **OR** ondansetron (ZOFRAN) injection 4 mg, 4 mg, IntraVENous, Q6H PRN  polyethylene glycol (GLYCOLAX) packet 17 g, 17 g, Oral, Daily PRN  acetaminophen (TYLENOL) tablet 650 mg, 650 mg, Oral, Q6H PRN **OR** acetaminophen (TYLENOL) suppository 650 mg, 650 mg, Rectal, Q6H PRN  amLODIPine (NORVASC) tablet 10 mg, 10 mg, Oral, Nightly  metoprolol tartrate (LOPRESSOR) tablet 50 mg, 50 mg, Oral, BID  labetalol (NORMODYNE;TRANDATE) injection 5 mg, 5 mg, IntraVENous, Q4H PRN   sodium chloride      sodium chloride      sodium chloride           Allergies. Allergies   Allergen Reactions    Latex      Skin turns red    Banana     Azithromycin     Codeine      \"sweating\"       Social History. Social History     Socioeconomic History    Marital status:      Spouse name: Not on file    Number of children: Not on file    Years of education: Not on file    Highest education level: Not on file   Occupational History    Occupation: Retired    Tobacco Use    Smoking status: Former Smoker     Packs/day: 1.00     Years: 30.00     Pack years: 30.00     Types: Cigarettes     Quit date: 10/14/1995     Years since quittin.6    Smokeless tobacco: Never Used   Vaping Use    Vaping Use: Never used   Substance and Sexual Activity    Alcohol use: No    Drug use: No    Sexual activity: Never   Other Topics Concern    Not on file   Social History Narrative    Not on file     Social Determinants of Health     Financial Resource Strain:     Difficulty of Paying Living Expenses: Not on file   Food Insecurity:     Worried About [address] in the Last Year: Not on file    [address] in the Last Year: Not on file   Transportation Needs:     Lack of Transportation (Medical): Not on file    Lack of Transportation (Non-Medical):  Not on file   Physical Activity:     Days of Exercise per Week: Not on file    Minutes of Exercise per Session: Not on file   Stress:     Feeling of Stress : Not on file   Social Connections:     Frequency of Communication with Friends and Family: Not on file    Frequency of Social Gatherings with Friends and Family: Not on file    Attends Protestant Services: Not on file    Active Member of Clubs or Organizations: Not on file    Attends Club or Organization Meetings: Not on file    Marital Status: Not on file   Intimate Partner Violence:     Fear of Current or Ex-Partner: Not on file    Emotionally Abused: Not on file    Physically Abused: Not on file    Sexually Abused: Not on file   Housing Stability:     Unable to Pay for Housing in the Last Year: Not on file    Number of [place] in the Last Year: Not on file    Unstable Housing in the Last Year: Not on file       Family History    Family History   Problem Relation Age of Onset    Heart Disease Father     No Known Problems Mother     Cancer Sister     Arrhythmia Brother     No Known Problems Maternal Grandmother     No Known Problems Maternal Grandfather     No Known Problems Paternal Grandmother     No Known Problems Paternal Grandfather     No Known Problems Other     Cancer Sister     Cancer Brother     Cancer Brother        Review of Systems. I have reviewed in detail the 10 system review with admitting physician and other consultants on the case     PHYSICAL EXAMINATION. /78   Pulse 91   Temp 97.8 °F (36.6 °C) (Oral)   Resp 18   Ht 5' 2\" (1.575 m)   Wt 177 lb 3.2 oz (80.4 kg)   SpO2 91%   BMI 32.41 kg/m²     Intake/Output Summary (Last 24 hours) at 6/13/2022 1407  Last data filed at 6/13/2022 0631  Gross per 24 hour   Intake 1560.07 ml   Output 250 ml   Net 1310.07 ml       INTAKE/OUTPUT:  I/O last 3 completed shifts: In: 2976.7 [P.O.:360; I.V.:1740.2; Blood:603.3; IV Piggyback:273.2]  Out: 250 [Urine:250]  No intake/output data recorded. Not ill appearing. mild respiratory distress.   Awake alert   no hypotension  External exam of the ears and nose are normal  HENT: exam is normal  Eyes: Pupils are equal, round, and reactive to light. Lymph Nodes. No axillary or cervical lymph nodes are palpable. Neck. JVD not visible. No lymph nodes palpable. CVS.  Heart sounds are normal.Palpation of the heart is normal. No murmurs. No pericardial rub.  RS.dullness on percussion of the lower chest wall. Lung fields are clear   PA soft , bowel sounds are normal no distension and no tenderness to palpation. Skin No rash , No palpable nodules  Musculoskeletal: Normal range of motion. 1+ edema and no tenderness. CNS  No focal    Edematrace  More awake and alert. All pulses are well felt      Labs reviewed by me       CBC:   Recent Labs     06/11/22  1640 06/11/22  2205 06/12/22  0643 06/12/22  1304 06/12/22  1914 06/13/22  0113 06/13/22  0823   WBC 6.5  --  4.6  --   --   --  13.0*   HGB 6.2*   < > 7.4*   < > 7.2* 6.6* 8.4*   HCT 20.8*   < > 24.3*   < > 24.1* 21.8* 27.7*   MCV 72.4*  --  74.0*  --   --   --  76.9*   PLT 52*  --  50*  --   --   --  90*    < > = values in this interval not displayed. BMP:   Recent Labs     06/11/22  1640 06/12/22  0643 06/13/22  0823    139 139   K 3.7 3.7 3.9   CL 96* 99 101   CO2 32 31 28   BUN 25* 23* 25*   CREATININE 1.4* 1.3* 1.3*     Magnesium:   Lab Results   Component Value Date    MG 1.70 09/20/2020    MG 1.90 09/19/2020    MG 2.00 06/30/2015                    ASSESSMENT           Acute kidney injury-mild to moderate-with decreased urine output. Hypovolemia  Severe anemia-possible anemia of GI bleed. History of uncontrolled hypertension. COPD. PLAN       Acute kidney injury-mild to moderate-creatinine is 1.4 on admission.  - Etiology likely multifactorial.  - Most likely prerenal due to volume depletion.  - Get ultrasound scan to exclude any urinary retention/hydronephrosis. Less likely due to ATN.       Work up for acute renal failure has been ordered which include:  Renal Panel study  CBC  Urine Analysis   Urine Electrolytes   U Protein : creatinine ratio  Urine for eosinophil smear exam.    Renal Ultrasound Scan     Hypertension. Appears well controlled. - Monitor for hypotension from hypovolemia.  - Hold ACE inhibitor till renal function stabilized. Continue current medications. Anemia-severe-hemoglobin 7.4 on admission. GI evaluating.  - Rule out possible anemia of GI bleed. Volume status-mild fluid overload. - Patient has lower extremity edema. - Could be related to amlodipine.  - Exclude any significant proteinuria. Current medications reviewed and appropriate. - Discussed with patient. Family updated. Thanks for the consult. Thanks for the consult             Electronically Signed:  Thalia Gary MD 6/13/2022          Arterial Blood Gasses  No results for input(s): PH, PCO2, PO2 in the last 72 hours. Invalid input(s): Amara Wilkinson    UA:No results for input(s): NITRITE, COLORU, PHUR, LABCAST, WBCUA, RBCUA, MUCUS, TRICHOMONAS, YEAST, BACTERIA, CLARITYU, SPECGRAV, LEUKOCYTESUR, UROBILINOGEN, BILIRUBINUR, BLOODU, GLUCOSEU, AMORPHOUS in the last 72 hours. Invalid input(s): Filippo Rice    LIVER PROFILE:   Recent Labs     06/11/22  1640   AST 25   ALT 16   BILITOT 0.4   ALKPHOS 111     PT/INR:    Lab Results   Component Value Date    PROTIME 13.3 09/19/2020    PROTIME 11.4 06/28/2015    INR 1.14 09/19/2020    INR 1.05 06/28/2015     PTT:    Lab Results   Component Value Date    APTT 26.2 09/19/2020     COCO:    Lab Results   Component Value Date    COCO Negative 09/20/2020           RADIOLOGY:    XR CHEST PORTABLE    Result Date: 6/12/2022  EXAMINATION: ONE XRAY VIEW OF THE CHEST 6/12/2022 6:06 pm COMPARISON: 06/11/2022 HISTORY: ORDERING SYSTEM PROVIDED HISTORY: acute hypoxia TECHNOLOGIST PROVIDED HISTORY: Reason for exam:->acute hypoxia Reason for Exam: acute hypoxia FINDINGS: Cardiac silhouette enlargement again demonstrated. Emphysematous changes again noted.   Vascular congestion is similar compared to the prior exam.  No evidence for overt edema. No consolidation identified. No pneumothorax or significant effusion. Chronic findings in the chest.  Similar appearance of vascular prominence without overt edema. XR CHEST PORTABLE    Result Date: 6/11/2022  EXAMINATION: ONE XRAY VIEW OF THE CHEST 6/11/2022 4:45 pm COMPARISON: Chest CT 03/23/2017. Chest radiograph 05/30/2016 HISTORY: ORDERING SYSTEM PROVIDED HISTORY: SOB TECHNOLOGIST PROVIDED HISTORY: Reason for exam:->SOB Reason for Exam: Shortness of Breath (Using nebulizer without relief; increasing SOB; just finished abx from urgent care; brought in by [facility].) FINDINGS: Cardiac silhouette enlargement again demonstrated. Emphysematous changes are again noted with chronic linear opacities in the mid and lower lung fields. No consolidation, pneumothorax or evidence for edema. No acute osseous abnormality identified. Chronic findings in the chest without acute airspace disease identified. Imaging Results.   Chest X Ray reviwed by me          Electronically Signed:  Lissett Lora MD 6/13/2022

## 2022-06-13 NOTE — PROGRESS NOTES
[facility] HOSPITALISTS PROGRESS NOTE    6/14/2022 8:58 AM        Name: Donald Dailey . Admitted: 6/11/2022  Primary Care Provider: Hakeem Ruby MD (Tel: [phone])      Chief Complaint   Patient presents with    Shortness of Breath     Using nebulizer without relief; increasing SOB; just finished abx from urgent care; brought in by [facility]. Brief History: Patient is an 78 yo female with hx COPD, chronic hypoxic respiratory failure on 3-4 liters continuously, HTN. She presented to ER with increasing shortness of breath and fatigue which have been progressively worsening over past several weeks. Found to have severe anemia with Hgb 6.2, stool guaiac positive. COVID and influenza screen negative. Subjective: Presently resting in bed, grand daughter visiting. Patient reports she is feeling better. Breathing improved but not at baseline, still increased sob with ambulation to bathroom. Denies chest pain, abdominal pain, nausea, constipation. Breath sounds with wheezes which patient states have improved. Intermittent cough. .    Reviewed interval ancillary notes    Current Medications  0.9 % sodium chloride infusion, PRN  pantoprazole (PROTONIX) tablet 40 mg, BID AC  iron sucrose (VENOFER) 200 mg in sodium chloride 0.9 % 100 mL IVPB, Q24H  budesonide (PULMICORT) nebulizer suspension 500 mcg, BID  Arformoterol Tartrate (BROVANA) nebulizer solution 15 mcg, BID  methylPREDNISolone sodium (SOLU-MEDROL) injection 40 mg, Q8H  albuterol (PROVENTIL) nebulizer solution 2.5 mg, Q4H PRN  ipratropium-albuterol (DUONEB) nebulizer solution 1 ampule, Q4H  0.9 % sodium chloride infusion, PRN  sodium chloride flush 0.9 % injection 5-40 mL, 2 times per day  sodium chloride flush 0.9 % injection 5-40 mL, PRN  0.9 % sodium chloride infusion, PRN  ondansetron (ZOFRAN-ODT) disintegrating tablet 4 mg, Q8H PRN   Or  ondansetron (ZOFRAN) injection 4 mg, Q6H PRN  polyethylene glycol (GLYCOLAX) packet 17 g, Daily PRN  acetaminophen (TYLENOL) tablet 650 mg, Q6H PRN   Or  acetaminophen (TYLENOL) suppository 650 mg, Q6H PRN  amLODIPine (NORVASC) tablet 10 mg, Nightly  metoprolol tartrate (LOPRESSOR) tablet 50 mg, BID  labetalol (NORMODYNE;TRANDATE) injection 5 mg, Q4H PRN        Objective:  /87   Pulse (!) 109   Temp 98 °F (36.7 °C) (Axillary)   Resp 20   Ht 5' 2\" (1.575 m)   Wt 177 lb 3.2 oz (80.4 kg)   SpO2 93%   BMI 32.41 kg/m²     Intake/Output Summary (Last 24 hours) at 6/14/2022 0858  Last data filed at 6/14/2022 0830  Gross per 24 hour   Intake 240 ml   Output 1000 ml   Net -760 ml      Wt Readings from Last 3 Encounters:   06/12/22 177 lb 3.2 oz (80.4 kg)   11/21/20 184 lb (83.5 kg)   10/02/20 180 lb (81.6 kg)       General appearance:  Appears comfortable  Eyes: Sclera clear. Pupils equal.  ENT: Moist oral mucosa. Trachea midline, no adenopathy. Cardiovascular: Regular rhythm, normal S1, S2. No murmur. No edema in lower extremities  Respiratory: Not using accessory muscles. G+ expiratory wheezes throughout. GI: Abdomen soft, no tenderness, not distended, normal bowel sounds  Musculoskeletal: No cyanosis in digits, neck supple  Neurology: CN 2-12 grossly intact. No speech or motor deficits  Psych: Normal affect. Alert and oriented in time, place and person  Skin: Warm, dry, normal turgor    Labs and Tests:  CBC:   Recent Labs     06/12/22  0643 06/12/22  1304 06/13/22  0823 06/13/22  0823 06/13/22  1326 06/13/22  2140 06/14/22  0220   WBC 4.6  --  13.0*  --   --   --  12.2*   HGB 7.4*   < > 8.4*   < > 7.8* 8.1* 8.4*   PLT 50*  --  90*  --   --   --  85*    < > = values in this interval not displayed.      BMP:    Recent Labs     06/12/22  0643 06/13/22  0823 06/14/22  0220    139 142   K 3.7 3.9 3.5   CL 99 101 100   CO2 31 28 32   BUN 23* 25* 27*   CREATININE 1.3* 1.3* 1.3*   GLUCOSE 151* 143* 146*     Hepatic:   Recent Labs 06/11/22  1640   AST 25   ALT 16   BILITOT 0.4   ALKPHOS 111       CXR 6/11/2022:  Chronic findings in the chest without acute airspace disease identified. CXR 6/12/2022:  Chronic findings in the chest.  Similar appearance of vascular prominence   without overt edema.         Renal Ultrasound 6/13/2021:  Unremarkable ultrasound of the kidneys and urinary bladder. Problem List  Principal Problem:    GI hemorrhage  Resolved Problems:    * No resolved hospital problems. *       Assessment & Plan:   1. Acute blood loss anemia. Hgb 6.2 on presentation and she received 1 unit PRBCs 6/11 and again 6/13. Stool occult positive. GI consulted, plan for EGD and colonoscopy once COPD exacerbation improved. Iron level 18, to receive IV Venofer x 3 doses. Hgb 8.4 today. 2. Acute COPD exacerbation. Started on IV solumedrol. Continue bronchodilators. Respiratory panel negative, llu and COVID swabs negative. Pulmonary on board. 3. Acute on chronic hypoxic respiratory failure. Secondary to COPD exacerbation. Increased O2 requirements and increased work of breathing. Currently on 7L NC, baseline 3-4 liters. Treatment as above. 4. HTN. Controlled. HCTZ held on admission considering JUN. Continue Norvasc and metoprolol. 5. JUN. Creatinine 1.4 on admission, baseline 0.9. Received gentle hydration, HCTZ on hold. Nephrology consulted.      Diet: ADULT DIET;  Regular  Code:Full Code  DVT PPX: sanjay Castaneda, APRN - CNP   6/14/2022 8:59 AM

## 2022-06-13 NOTE — PROGRESS NOTES
Hospitalist Progress Note      PCP: Ale Sheffield MD    Date of Admission: 6/11/2022    Chief Complaint: Shortness of breath    Hospital Course: Raudel Gaitan is a 79 y.o. female with h/o HTN , COPD, Anemia , chronic respiratory failure, Colectomy, colon CA  presented with c/o dyspnea and fatigue. Symptoms are worsening over the past couple of weeks. She was seen at the urgent care and was prescribed antibiotics. Denies melena. No recent Endoscopy. Labs revealed hb 6.2 dropped from 12.6 since 2020. Stool is Guaiac positive. Neg Influenza and COVID swab. No for further work-up and treatment. Subjective: Patient sitting up in bed, states she still more short of breath than normal but feels better. Has not noticed any blood in her stool, but does not seem to check it regularly. Able to more blood if needed. Awaiting GI evaluation. Baseline O2 requirements 2 L, currently on 3-1/2 L. Reviewed JUN. Agreeable to IV iron. Denies chest pain palpitation abdominal pain nausea vomiting headache lightheadedness dizziness. Reviewed plan of care, Micronase questions. Assessment/Plan:    Acute GI bleed  Hx of colon cancer   -Likely upper, patient thinks she has had some dark stool  -Stool occult positive  -Continue Protonix po bid   -Clear liquids  -holding oral AC  -Hemoglobin trended down again overnight. Has received second unit of packed red blood cells this admission. -GI consulted, once COPD exacerbation improved we will proceed with EGD and colonoscopy    Acute blood loss anemia  -Hemoglobin 6.2 on admission.   Dropped again to 6.6  -H&H every 6 hours  -2nd unit prbc given this am    Acute COPD exacerbation  -continue DuoNebs, Pulmicort, Brovannae nebs  -Solu-Medrol 40 mg IV every 8 hour, already received loading dose in the ER  -Continue O2  -respiratory panel negative  -Flu and COVID swabs negative  -Pulmonary consulted    Acute on chronic hypoxic respiratory failure  -Secondary to COPD exacerbation  -Baseline O2 requirements 2 L, currently on 6L NC  -Wean O2 as able    Recent epistaxis  -Home O2 is not humidified, social work consult to help arrange this for home.  -Asked nursing to humidify O2 while in hospital    Controlled hypertension  -Continue Norvasc and metoprolol  -Holding hydrochlorothiazide for JUN    JUN  -Baseline creatinine 0.9, admission 1.4, today 1.3 (1.3)  -Normal saline 100 mils an hour  -Avoid nephrotoxic medications  -Hold hydrochlorothiazide  -nephrology consult for persistent JUN    Iron deficiency anemia  -Iron level 18  -Venofer 200 mg IV x3 doses      Active Hospital Problems    Diagnosis     GI hemorrhage [K92.2]      Priority: Medium       Medications:  Reviewed    Infusion Medications    sodium chloride      sodium chloride      pantoprazole 8 mg/hr (06/12/22 2054)    sodium chloride       Scheduled Medications    iron sucrose (VENOFER) iv piggyback 100 mL (Admin over 60 minutes)  200 mg IntraVENous Q24H    budesonide  0.5 mg Nebulization BID    Arformoterol Tartrate  15 mcg Nebulization BID    methylPREDNISolone  40 mg IntraVENous Q8H    ipratropium-albuterol  1 ampule Inhalation Q4H    sodium chloride flush  5-40 mL IntraVENous 2 times per day    amLODIPine  10 mg Oral Nightly    metoprolol tartrate  50 mg Oral BID     PRN Meds: sodium chloride, albuterol, sodium chloride, sodium chloride flush, sodium chloride, ondansetron **OR** ondansetron, polyethylene glycol, acetaminophen **OR** acetaminophen, labetalol      Intake/Output Summary (Last 24 hours) at 6/13/2022 1108  Last data filed at 6/13/2022 0631  Gross per 24 hour   Intake 1560.07 ml   Output 250 ml   Net 1310.07 ml       Physical Exam Performed:    BP (!) 148/66   Pulse 100   Temp 98.3 °F (36.8 °C) (Axillary)   Resp 20   Ht 5' 2\" (1.575 m)   Wt 177 lb 3.2 oz (80.4 kg)   SpO2 96%   BMI 32.41 kg/m²     General appearance: No apparent distress, appears stated age and cooperative.   HEENT: Pupils equal, round, and reactive to light. Conjunctivae/corneas clear. Neck: Supple, with full range of motion. No jugular venous distention. Trachea midline. Respiratory: No dyspnea with conversation, but with exertion. Expiratory wheezes bilaterally throughout. Cardiovascular: Regular rate and rhythm with normal S1/S2 without murmurs, rubs or gallops. Abdomen: Soft, non-tender, non-distended with normal bowel sounds. Musculoskeletal: No clubbing, cyanosis or edema bilaterally. Full range of motion without deformity. Skin: Skin color, texture, turgor normal.  No rashes or lesions. Neurologic:  Neurovascularly intact without any focal sensory/motor deficits. Cranial nerves: II-XII intact, grossly non-focal.  Psychiatric: Alert and oriented, thought content appropriate, normal insight  Capillary Refill: Brisk,< 3 seconds   Peripheral Pulses: +2 palpable, equal bilaterally       Labs:   Recent Labs     06/11/22  1640 06/11/22  2205 06/12/22  0643 06/12/22  1304 06/12/22  1914 06/13/22  0113 06/13/22  0823   WBC 6.5  --  4.6  --   --   --  13.0*   HGB 6.2*   < > 7.4*   < > 7.2* 6.6* 8.4*   HCT 20.8*   < > 24.3*   < > 24.1* 21.8* 27.7*   PLT 52*  --  50*  --   --   --  90*    < > = values in this interval not displayed. Recent Labs     06/11/22  1640 06/12/22  0643 06/13/22  0823    139 139   K 3.7 3.7 3.9   CL 96* 99 101   CO2 32 31 28   BUN 25* 23* 25*   CREATININE 1.4* 1.3* 1.3*   CALCIUM 9.6 9.3 9.5     Recent Labs     06/11/22  1640   AST 25   ALT 16   BILITOT 0.4   ALKPHOS 111     No results for input(s): INR in the last 72 hours.   Recent Labs     06/11/22  1640   TROPONINI <0.01       Urinalysis:      Lab Results   Component Value Date    NITRU Negative 09/18/2020    WBCUA 61 09/18/2020    BACTERIA 4+ 09/18/2020    RBCUA 2 09/18/2020    BLOODU Negative 09/18/2020    SPECGRAV 1.016 09/18/2020    GLUCOSEU Negative 09/18/2020       Radiology:  XR CHEST PORTABLE   Final Result   Chronic findings in the chest.  Similar appearance of vascular prominence   without overt edema. XR CHEST PORTABLE   Final Result   Chronic findings in the chest without acute airspace disease identified. DVT Prophylaxis: SCDs  Diet: ADULT DIET; Clear Liquid  Code Status: Full Code    PT/OT Eval Status: Eval ordered    Dispo -home once medically stable    SHAN Duncan - CNP      NOTE:  This report was transcribed using voice recognition software. Every effort was made to ensure accuracy; however, inadvertent computerized transcription errors may be present.

## 2022-06-13 NOTE — PROGRESS NOTES
Hgb 6.6; 1 unit PRBCs ordered    Blood transfusion started. Pt observed for initial 15 minutes. No reaction suspected. VSS. Denies further needs. Call light within reach.

## 2022-06-13 NOTE — CONSULTS
PULMONARY AND CRITICAL CARE MEDICINE CONSULTATION NOTE    CONSULTING PHYSICIAN:  SHAN Estrada    ADMISSION DATE: 6/11/2022  ADMISSION DIAGNOSIS: GI hemorrhage [K92.2]  Hypoxia [R09.02]  COPD exacerbation (HCC) [J44.1]  Blood loss anemia [D50.0]    REASON FOR CONSULT:   Chief Complaint   Patient presents with    Shortness of Breath     Using nebulizer without relief; increasing SOB; just finished abx from urgent care; brought in by [facility]. DATE OF CONSULT: 6/11/2022    HISTORY OF PRESENT ILLNESS: 79y.o. year old female with a past medical history significant for COPD on home oxygen at 3 to 4 L/min, chronic anemia, history of colon cancer s/p colectomy who presented to the hospital with shortness of breath and generalized fatigue. Her symptoms have been present for the last several weeks and are slowly progressive. She went to an urgent care where she was given a course of amoxicillin but it failed to improve her symptoms. In the ER she was found to be mildly hypoxic. Also found to have severe anemia with hemoglobin of 6.2. Stool guaiac was positive. At the time of interview patient sitting in bed in no apparent respiratory distress. Reports that her breathing is slowly improving. Oxygen requirements currently at 6 L/min with patient saturating 96 to 97% on the monitor. Denies any discolored expectoration. Remains afebrile. No chest pain, chest tightness, abdominal pain, nausea, vomiting. At home patient is taking only albuterol inhaler as needed. Used to smoke 1 pack of cigarettes daily for 30 years before quitting in 1995. Does not follow with a pulmonologist.    REVIEW OF SYSTEMS:     CONSTITUTIONAL SYMPTOMS: The patient denies fever, fatigue, night sweats, weight loss or weight gain. HEENT: No vision changes. No tinnitus, Denies sinus pain. No hoarseness, or dysphagia. NECK: Patient denies swelling in the neck.    CARDIOVASCULAR: Denies chest pain, palpitation, syncope. RESPIRATORY: As per HPI. GASTROINTESTINAL: Denies nausea, abdominal pain or change in bowel function. GENITOURINARY: Denies obstructive symptoms. No history of incontinence. BREASTS: No masses or lumps in the breasts. SKIN: No rashes or itching. MUSCULOSKELETAL: Denies weakness or bone pain. NEUROLOGICAL: No headaches or seizures. PSYCHIATRIC: Denies mood swings or depression. ENDOCRINE: Denies heat or cold intolerance or excessive thirst.  HEMATOLOGIC/LYMPHATIC: Denies easy bruising or lymph node swelling. ALLERGIC/IMMUNOLOGIC: No environmental allergies. PAST MEDICAL HISTORY:   Past Medical History:   Diagnosis Date    Cataracts, bilateral     Cervical cancer screening     Nml per pt'    COPD (chronic obstructive pulmonary disease) ([facility] [address]     Portable oxygen:2 L/min    Gall stones     H/O colonoscopy     polyp    H/O mammogram     Nml per pt'.     Hypertension 2010    Morbid obesity with BMI of 40.0-44.9, adult ([facility] [address] 2020       PAST SURGICAL HISTORY:   Past Surgical History:   Procedure Laterality Date    COLON SURGERY      colon cancer:s/p surgery per pt'       SOCIAL HISTORY:   Social History     Tobacco Use    Smoking status: Former Smoker     Packs/day: 1.00     Years: 30.00     Pack years: 30.00     Types: Cigarettes     Quit date: 10/14/1995     Years since quittin.6    Smokeless tobacco: Never Used   Vaping Use    Vaping Use: Never used   Substance Use Topics    Alcohol use: No    Drug use: No       FAMILY HISTORY:   Family History   Problem Relation Age of Onset    Heart Disease Father     No Known Problems Mother     Cancer Sister     Arrhythmia Brother     No Known Problems Maternal Grandmother     No Known Problems Maternal Grandfather     No Known Problems Paternal Grandmother     No Known Problems Paternal Grandfather     No Known Problems Other     Cancer Sister     Cancer Brother     Cancer Brother        MEDICATIONS: No current facility-administered medications on file prior to encounter. Current Outpatient Medications on File Prior to Encounter   Medication Sig Dispense Refill    hydroCHLOROthiazide (HYDRODIURIL) 25 MG tablet Take 25 mg by mouth daily      fluticasone-salmeterol (ADVAIR DISKUS) 250-50 MCG/DOSE AEPB Inhale 1 puff into the lungs every 12 hours      albuterol sulfate  (90 BASE) MCG/ACT inhaler Inhale 2 puffs into the lungs every 4 hours as needed for Wheezing or Shortness of Breath 1 Inhaler 11    amLODIPine (NORVASC) 10 MG tablet TAKE 1 TABLET BY MOUTH DAILY 30 tablet 0    SPIRIVA HANDIHALER 18 MCG inhalation capsule INHALE 1 CAPSULE INTO THE LUNGS DAILY USING THE HANDIHALER 30 capsule 0    metoprolol tartrate (LOPRESSOR) 25 MG tablet Take 1 tablet by mouth 2 times daily (Patient taking differently: Take 50 mg by mouth daily ) 60 tablet 1    ketoconazole (NIZORAL) 2 % cream Apply topically daily Apply topically daily.           iron sucrose (VENOFER) iv piggyback 100 mL (Admin over 60 minutes)  200 mg IntraVENous Q24H    budesonide  0.5 mg Nebulization BID    Arformoterol Tartrate  15 mcg Nebulization BID    methylPREDNISolone  40 mg IntraVENous Q8H    ipratropium-albuterol  1 ampule Inhalation Q4H    sodium chloride flush  5-40 mL IntraVENous 2 times per day    amLODIPine  10 mg Oral Nightly    metoprolol tartrate  50 mg Oral BID      sodium chloride      sodium chloride      pantoprazole 8 mg/hr (06/12/22 2054)    sodium chloride       sodium chloride, albuterol, sodium chloride, sodium chloride flush, sodium chloride, ondansetron **OR** ondansetron, polyethylene glycol, acetaminophen **OR** acetaminophen, labetalol      ALLERGIES:   Allergies as of 06/11/2022 - Fully Reviewed 06/11/2022   Allergen Reaction Noted    Latex  06/11/2022    Banana  06/11/2022    Azithromycin  05/13/2015    Codeine  05/13/2015      OBJECTIVE:   height is 5' 2\" (1.575 m) and weight is 177 lb 3.2 oz (80.4 kg). Her oral temperature is 97.8 °F (36.6 °C). Her blood pressure is 126/78 and her pulse is 91. Her respiration is 18 and oxygen saturation is 91%. No intake/output data recorded. PHYSICAL EXAM:    CONSTITUTIONAL: She is a 79y.o.-year-old who appears her stated age. She is alert and oriented x 3 and in no acute distress. HEENT: PERRLA, EOMI. No scleral icterus. No thrush, atraumatic, normocephalic. NECK: Supple, without cervical or supraclavicular lymphadenopathy:  CARDIOVASCULAR: S1 S2 RRR. Without murmer  RESPIRATORY & CHEST: Bilateral diffuse wheezing heard. No crackles heard. GASTROINTESTINAL & ABDOMEN: Soft, nontender, positive bowel sounds in all quadrants, non-distended, without hepatosplenomegaly. GENITOURINARY: Deferred. MUSCULOSKELETAL: No tenderness to palpation of the axial skeleton. There is no clubbing. No cyanosis. No edema of the lower extremities. SKIN OF BODY: No rash or jaundice. PSYCHIATRIC EVALUATION: Normal affect. Patient answers questions appropriately. HEMATOLOGIC/LYMPHATIC/ IMMUNOLOGIC: No palpable lymphadenopathy. NEUROLOGIC: Alert and oriented x 3. Groslly non-focal. Motor strength is 5+/5 in all muscle groups. The patient has a normal sensorium globally. LABS:  Recent Labs     06/11/22  1640 06/11/22  2205 06/12/22  0643 06/12/22  1304 06/12/22  1914 06/13/22  0113 06/13/22  0823   WBC 6.5  --  4.6  --   --   --  13.0*   HGB 6.2*   < > 7.4*   < > 7.2* 6.6* 8.4*   HCT 20.8*   < > 24.3*   < > 24.1* 21.8* 27.7*   PLT 52*  --  50*  --   --   --  90*   ALT 16  --   --   --   --   --   --    AST 25  --   --   --   --   --   --      --  139  --   --   --  139   K 3.7  --  3.7  --   --   --  3.9   CL 96*  --  99  --   --   --  101   CREATININE 1.4*  --  1.3*  --   --   --  1.3*   BUN 25*  --  23*  --   --   --  25*   CO2 32  --  31  --   --   --  28    < > = values in this interval not displayed.        Recent Labs     06/11/22  1640 06/12/22  0500 06/13/22  0823   GLUCOSE 135* 151* 143*   CALCIUM 9.6 9.3 9.5    139 139   K 3.7 3.7 3.9   CO2 32 31 28   CL 96* 99 101   BUN 25* 23* 25*   CREATININE 1.4* 1.3* 1.3*       Recent Labs     06/12/22  1824   PHART 7.393   QYC6OPS 50.0*   PO2ART 69.2*   GJN6HFX 30.4*   D5IMOUKL 94.7   BEART 5.0*       Lab Results   Component Value Date    INR 1.14 09/19/2020    INR 1.05 06/28/2015    PROTIME 13.3 (H) 09/19/2020    PROTIME 11.4 06/28/2015     Lab Results   Component Value Date    AMYLASE 74 02/16/2013      No results found for: LABA1C  No results found for: EAG  No results found for: TSH, M6JSBUR, E6IWFHP, THYROIDAB, FT3, T4FREE  Lab Results   Component Value Date    TROPONINI <0.01 06/11/2022      No results found for: CRP   Lab Results   Component Value Date    BNP 66.1 05/28/2013      Lab Results   Component Value Date    DDIMER >5250 (H) 06/28/2015      Lab Results   Component Value Date    FERRITIN 22.3 06/11/2022      Lab Results   Component Value Date    LACTA 1.0 09/19/2020           IMAGING:    Narrative   EXAMINATION:   ONE XRAY VIEW OF THE CHEST       6/12/2022 6:06 pm           FINDINGS:   Cardiac silhouette enlargement again demonstrated.  Emphysematous changes   again noted.  Vascular congestion is similar compared to the prior exam.  No   evidence for overt edema.  No consolidation identified.  No pneumothorax or   significant effusion.           Impression   Chronic findings in the chest.  Similar appearance of vascular prominence   without overt edema.             IMPRESSION:     1. COPD of unknown severity in mild exacerbation  2. Severe symptomatic anemia  3. Possible GI bleed  4. Acute on chronic hypoxic respiratory failure  5. Previous history of tobacco abuse    RECOMMENDATION:     1. Patient has presented to the hospital with shortness of breath and generalized fatigue. 2. Suspect that she has a combination of mild COPD exacerbation and severe anemia leading to above symptoms.   3. Continue with Pulmicort, Brovana nebulization twice a day. 4. She can continue to receive DuoNeb around-the-clock. 5. Currently Solu-Medrol is going at 40 mg IV every 8 hours. 6. While this dosage will help her breathing, it can worsen her gastric bleeding. 7. I would recommend that her Protonix can be increased to 40 mg twice a day. 8. I have titrated her oxygen supplementation down to 4 L/min which is her baseline oxygen requirement. Titrate to maintain SPO2 between 88 to 92%. 9. Low suspicion for pulm infection. No indications for antibiotics. 10. Out of bed into chair. 11. Encourage incentive spirometry. Thank you for your consultation. We will continue to follow the patient. Dickie Opitz, MD  Pulmonary Critical Care and Sleep Medicine  6/11/2022, 1:27 PM    This note was completed using dragon medical speech recognition software. Grammatical errors, random word insertions, pronoun errors and incomplete sentences are occasional consequences of this technology due to software limitations. If there are questions or concerns about the content of this note of information contained within the body of this dictation they should be addressed with the provider for clarification.

## 2022-06-14 NOTE — PROGRESS NOTES
Gastroenterology Progress Note      Segundo Chakraborty is a 79 y.o. female patient. 1. Blood loss anemia    2. Hypoxia    3. COPD exacerbation ([address]        SUBJECTIVE:  Feels better as far as her breathing. No abdominal pain, N/V.     ROS:  Cardiovascular ROS: no chest pain or dyspnea on exertion  Gastrointestinal ROS: see hpi  Respiratory ROS: no cough, SOB    Physical    VITALS:  BP (!) 164/72   Pulse (!) 101   Temp 97.7 °F (36.5 °C) (Oral)   Resp 20   Ht 5' 2\" (1.575 m)   Wt 177 lb 3.2 oz (80.4 kg)   SpO2 91%   BMI 32.41 kg/m²   TEMPERATURE:  Current - Temp: 97.7 °F (36.5 °C); Max - Temp  Av.2 °F (36.8 °C)  Min: 97.7 °F (36.5 °C)  Max: 98.7 °F (37.1 °C)    NAD  RRR, Nl s1s2  Lungs few wheezes  Abdomen soft, ND, NT, no HSM, Bowel sounds normal  AAOx3      Data    Data Review:    Recent Labs     22  0643 22  1304 22  0823 22  1326 22  2140 22  0220 22  0952   WBC 4.6  --  13.0*  --   --  12.2*  --    HGB 7.4*   < > 8.4*   < > 8.1* 8.4* 9.1*   HCT 24.3*   < > 27.7*   < > 26.5* 26.6* 29.2*   MCV 74.0*  --  76.9*  --   --  75.8*  --    PLT 50*  --  90*  --   --  85*  --     < > = values in this interval not displayed. Recent Labs     22  0643 22  0823 22  0220    139 142   K 3.7 3.9 3.5   CL 99 101 100   CO2 31 28 32   BUN 23* 25* 27*   CREATININE 1.3* 1.3* 1.3*     Recent Labs     22  1640   AST 25   ALT 16   BILITOT 0.4   ALKPHOS 111     No results for input(s): LIPASE, AMYLASE in the last 72 hours. No results for input(s): PROTIME, INR in the last 72 hours. No results for input(s): PTT in the last 72 hours. ASSESSMENT     Iron deficiency anemia - no gross GI bleeding. H/o CRC  COPD - currently on baseline 4 L O2. Thrombocytopenia     PLAN    - clear liquids  - bowel prep today  - NPO midnight  - plan EGD and colonoscopy tomorrow. D/w Dr Lydia Masterson and she is felt to be stable for endo tomorrow. Discussed with BRANDEN Negron  I have personally performed a face to face diagnostic evaluation on this patient. I have interviewed and examined the patient and I agree with the findings and recommended plan of care. In summary, my findings and plan are the following: As above, feeling better and close to baseline pulm status. No abd pain and no tenderness on exam.  Hb stable.   Will prep today for EGD/Colonoscopy in AM.    Octavio Blake, [address] [place] [address]  6/14/2022

## 2022-06-14 NOTE — PROGRESS NOTES
VSS. Call light within reach. Assessment complete at this time. No signs of distress or additional needs at this time. Fall precautions in place. Family at bedside. Scheduled medication given.

## 2022-06-14 NOTE — PROGRESS NOTES
[facility] Department   Phone: [phone]    Physical Therapy    [x] Initial Evaluation            [] Daily Treatment Note         [] Discharge Summary      Patient: Hever Ku   : 1940   MRN: [id number]   Date of Service:  2022  Admitting Diagnosis: GI hemorrhage  Current Admission Summary: Hever Ku is a 79 y.o. female who presents to the emergency department with a chief complaint of shortness of breath that has been getting worse for at least 2 weeks. She was initially seen by urgent care and finished antibiotics multiple days ago. Has history of COPD chronically on 2 L nasal cannula oxygen. On 2 L here she is satting at 89%. Currently on 4 L in the mid to high 90s. She states she occasionally has a cough that is nonproductive. Denies chest pain, nausea, vomiting, fevers, abdominal pain, urinary or bowel symptoms. Denies leg swelling. Feels like she has been wheezing more at home. Has not been vaccinated for COVID or flu. Past Medical History:  has a past medical history of Cataracts, bilateral, Cervical cancer screening, COPD (chronic obstructive pulmonary disease) ([facility] [address] Gall stones, H/O colonoscopy, H/O mammogram, Hypertension, and Morbid obesity with BMI of 40.0-44.9, adult ([facility] [address] Past Surgical History:  has a past surgical history that includes Colon surgery (). Discharge Recommendations: Hever Ku scored a 18/24 on the AM-PAC short mobility form. Current research shows that an AM-PAC score of 17 or less is typically not associated with a discharge to the patient's home setting. Based on the patient's AM-PAC score and their current functional mobility deficits, it is recommended that the patient have 3-5 sessions per week of Physical Therapy at d/c to increase the patient's independence. Please see assessment section for further patient specific details.     If patient discharges prior to next session this note will serve as a discharge summary. Please see below for the latest assessment towards goals. DME Required For Discharge: rollator (4 wheel walker)  Precautions/Restrictions: high fall risk  Weight Bearing Restrictions: no restrictions  [] Right Upper Extremity  [] Left Upper Extremity [] Right Lower Extremity  [] Left Lower Extremity     Required Braces/Orthotics: no braces required   [] Right  [] Left  Positional Restrictions:no positional restrictions    Pre-Admission Information   Lives With: daughter, grandchildren    Type of Home: house  Home Layout: two level, . Comment: lives on first floor  Home Access: 3 steps step to enter without rails   Bathroom Layout: tub/shower unit  Toilet Height: standard height  Bathroom Equipment: shower chair, . Comment: does not use shower chair  Home Equipment: no prior equipment  Transfer Assistance: Independent without use of device  Ambulation Assistance:Independent without use of device  ADL Assistance: independent with all ADL's  IADL Assistance: requires assistance with yard work, requires assistance with driving/transportation, requires assistance with shopping  Active :        [] Yes  [x] No  Hand Dominance: [] Left  [] Right  Current Employment: retired. Occupation:   Hobbies:   Recent Falls: none    Daughter gets groceries  She mentioned that she does not leave the house very often. Pt has moments of fear of being home alone due to her SOB and medical co-morbidities. Examination   Vision:   Vision Gross Assessment: Impaired and Vision Corrective Device: wears contacts , lense implants  Hearing:   WFL  Sensation:   WFL  Proprioception:    WFL  Tone:   Normotonic  Coordination Testing:   WFL    ROM:   (B) LE AROM WFL  Strength:   (B) LE strength grossly WFL  Decision Making: low complexity  Clinical Presentation: stable      Subjective  General: Pt presented seated in bed with no major complaints of pain.    Pain: 0/10  Pain Interventions: not applicable       Functional Mobility  Bed Mobility  Supine to Sit: Independent  Comments:  Transfers  Sit to stand transfer: contact guard assistance  Stand to sit transfer: contact guard assistance  Comments:  Ambulation  Surface:level surface  Assistive Device: rolling walker  Assistance: contact guard assistance  Distance: 140 ft (RW implemented after 40 ft)  Gait Mechanics: decreased step length and bob  Comments: Pt ambulated 40 ft, reported some instability, so PT proceeded to implement a RW to improve ambulation. Pt reported improved stability following this addition. Stair Mobility  Stair mobility not completed on this date. Comments:  Wheelchair Mobility:  No w/c mobility completed on this date. Comments:  Balance  Static Sitting Balance: good: independent with functional balance in unsupported position  Dynamic Sitting Balance: good: independent with functional balance in unsupported position  Static Standing Balance: fair: maintains balance at CGA without use of UE support  Dynamic Standing Balance: fair: maintains balance at CGA without use of UE support  Comments:    Other Therapeutic Interventions    Functional Outcomes  AM-PAC Inpatient Mobility Raw Score : 18              Cognition  WFL  Orientation:    alert and oriented x 4  Command Following:   [facility]    Education  Barriers To Learning: none  Patient Education: patient educated on PT role and benefits, plan of care, HEP, general safety, proper use of assistive device/equipment, discharge recommendations  Learning Assessment:  patient verbalizes and demonstrates understanding    Assessment  Activity Tolerance: Pt reported significant dyspnea throughout the session, despite being on 4L/min of O2 throughout the session. Prior to activity, the pt had O2 levels of 93%, following activity, her levels dropped to 83% returning to 92% after rest. She reported SOB that caused panic during community ambulation.   Impairments Requiring Therapeutic Intervention: decreased functional mobility, decreased ADL status, decreased endurance, decreased balance  Prognosis: good  Clinical Assessment: Pt has a history of COPD, and hypoxia with low O2 levels. During today's session she reported fear of returning home, the desire to improve her endurance, and fear with community ambulation and breathlessness. The pt was previously independent, lives with her daughter and did not use an AD. She ambulated a total of 140 ft (100 ft with RW) reporting SOB and instability. Considering these factors, PT recommends that the pt receives skilled therapy, and a rollator walker before returning home to improve endurance, independence, and community ambulation. Safety Interventions: patient left in chair, chair alarm in place, call light within reach, gait belt, patient at risk for falls and nurse notified    Plan  Frequency: 3-5 x/per week  Current Treatment Recommendations: strengthening, balance training, gait training, endurance training and home exercise program    Goals  Patient Goals: To improve confidence and reduce anxiety about breathlessness   Short Term Goals:  Time Frame: discharge  Patient will complete transfers at stand by assistance   Patient will ambulate 200 ft with use of rolling walker at stand by assistance, with minimal reports of SOB  Patient to maintain dynamic standing activity at contact guard assistance for 10 minutes. Therapy Session Time      Individual Group Co-treatment   Time In     0935   Time Out     1037   Minutes     62     Timed Code Treatment Minutes:  52 Minutes  Total Treatment Minutes:     [address] [facility] [address], SPT  Electronically Signed By: Jones Cash PT  I agree with the above note. PT directly observed the SPT with the patient.   Tricia Castro DPT [id number]

## 2022-06-14 NOTE — PROGRESS NOTES
[facility] Department   Phone: [phone]    Occupational Therapy    [x] Initial Evaluation            [] Daily Treatment Note         [] Discharge Summary      Patient: Jackeline Silvestre   : 1940   MRN: [id number]   Date of Service:  2022    Admitting Diagnosis: GI hemorrhage  Current Admission Summary: Sol Schuster a 79 y. o. female who presents to the emergency department with a chief complaint of shortness of breath that has been getting worse for at least 2 weeks. Sheryl Cody was initially seen by urgent care and finished antibiotics multiple days ago. Kurtis Russell history of COPD chronically on 2 L nasal cannula oxygen.  On 2 L here she is satting at 89%.  Currently on 4 L in the mid to high 90s.  She states she occasionally has a cough that is nonproductive.  Denies chest pain, nausea, vomiting, fevers, abdominal pain, urinary or bowel symptoms.  Denies leg swelling.  Feels like she has been wheezing more at home.  Has not been vaccinated for COVID or flu. Past Medical History:  has a past medical history of Cataracts, bilateral, Cervical cancer screening, COPD (chronic obstructive pulmonary disease) ([facility] [address] Gall stones, H/O colonoscopy, H/O mammogram, Hypertension, and Morbid obesity with BMI of 40.0-44.9, adult ([address] Past Surgical History:  has a past surgical history that includes Colon surgery (). Discharge Recommendations: Jackeline Silvestre scored a 18/24 on the AM-PAC ADL Inpatient form. Current research shows that an AM-PAC score of 17 or less is typically not associated with a discharge to the patient's home setting. Based on the patient's AM-PAC score and their current ADL deficits, it is recommended that the patient have 3-5 sessions per week of Occupational Therapy at d/c to increase the patient's independence. Please see assessment section for further patient specific details.     If patient discharges prior to next session this note will serve as a discharge summary. Please see below for the latest assessment towards goals. If patient declines above recommendations, would recommend HOME HEALTH CARE: LEVEL 3 SAFETY     - Initial home health evaluation to occur within 24-48 hours, in patient home   - Therapy evaluations in home within 24-48 hours of discharge; including DME and home safety   - Frontload therapy 5 days, then 3x a week   - Therapy to evaluate if patient has [address] needs for personal care   -  evaluation within 24-48 hours, includes evaluation of resources and insurance to determine [place], [place], LTC, and Medicaid options        DME Required For Discharge: rollator (4 wheel walker)  Precautions/Restrictions: high fall risk, 4 L O2 (decreased from 7L, patient's baseline is 2L O2. Patient with decreased saturations (82% following mobility, ~5 minutes to recover  Weight Bearing Restrictions: no restrictions  []? Right Upper Extremity     []? Left Upper Extremity     []? Right Lower Extremity       []? Left Lower Extremity         Required Braces/Orthotics: no braces required             []? Right          []? Left  Positional Restrictions:no positional restrictions     Pre-Admission Information   Lives With: daughter, grandchildren                    Type of Home: house  Home Layout: two level, . Comment: lives on first floor  Home Access: 3 steps step to enter without rails   Bathroom Layout: tub/shower unit  Toilet Height: standard height  Bathroom Equipment: shower chair, . Comment: does not use shower chair  Home Equipment: no prior equipment  Transfer Assistance: Independent without use of device  Ambulation Assistance:Independent without use of device  ADL Assistance: independent with all ADL's  IADL Assistance: requires assistance with yard work, requires assistance with driving/transportation, requires assistance with shopping  Active :        []? Yes            [x]? No  Hand Dominance: []?  Left            []? Right  Current Employment: retired. Occupation:   Hobbies:   Recent Falls: none     Daughter gets groceries  She mentioned that she does not leave the house very often. Pt has moments of fear of being home alone due to her SOB and medical co-morbidities.       Examination   Vision:   Vision Gross Assessment: Impaired and Vision Corrective Device: wears contacts , lense implants  Hearing:   WFL  Sensation:   WFL  Proprioception:    WFL  Tone:   Normotonic  Coordination Testing:   WFL    ROM:   (B) UE AROM WFL  Strength:   (B) UE strength grossly WFL    Decision Making: low complexity  Clinical Presentation: stable      Subjective  General: Patient long sitting in bed upon arrival, agreeable to evaluation. Patient talkative, requires redirection to task throughout. Patient tearful and expresses fear of \"being home alone and needing someone\" as daughter lives above patient but works. Patient 93% at rest on 4L O2  Pain: 0/10  Pain Interventions: not applicable        Activities of Daily Living  Basic Activities of Daily Living  Feeding: setup assistance  Feeding Comments: beverage setup (requesting water, states she feels that she has a \"lump\" in chest/abdomen after eating breakfast sausage)  Grooming: contact guard assistance  Grooming Comments: washing hands and face in stance at sink  Lower Extremity Dressing: moderate assistance. Equipment: none  Dressing Comments: patient with pants on and sanitary pad, but without underwear. Depends provided, patient required assist to orient and thread depends, able to doff pants, Stefanie to thread and don (seated on toilet)  Toileting: stand by assistance. Equipment: none  Toileting Comments: urinating on toilet, patient able to manage clothing and complete hygiene  General Comments: Patient declined further ADLs (offered oral care, bathing)  Instrumental Activities of Daily Living  No IADL completed on this date.     Functional Mobility  Bed Mobility  Scooting: stand by assistance  Comments: Patient in long sitting upon arrival, speaking to MD, remained OOB in recliner end of session  Transfers  Sit to stand transfer:contact guard assistance  Stand to sit transfer: contact guard assistance  Bed / Chair Transfer: contact guard assistance. Mobility technique: ambulating. Equipment utilized: rolling walker  Toilet transfer: contact guard assistance. Mobility technique: ambulating. Equipment utilized: standard toilet  Comments:  Functional Mobility:  Functional Mobility: rolling walker. stand by assistance, contact guard assistance.   Activity: to/from bathroom, ~140 feet total (initial 20 feet without AD, patient c/o feeling unsteady, reaching for surfaces, RW implemented after initial 20 feet)    Other Therapeutic Interventions    Functional Outcomes  AM-PAC Inpatient Daily Activity Raw Score: 18    Cognition  Overall Cognitive Status: Impaired  Memory: decreased short term memory  Safety Judgement: decreased awareness of need for safety  Problem Solving: assistance required to generate solutions  Sequencing: requires cues for some  Comments: General RW safety and use  Orientation:    alert and oriented x 4  Command Following:   accurately follows one step commands     Education  Barriers To Learning: cognition/memory  Patient Education: patient educated on goals, OT role and benefits, plan of care, discharge recommendations  Learning Assessment:  patient verbalizes understanding, would benefit from continued reinforcement    Assessment  Activity Tolerance: Patient tolerated session well, increased time to recover following mobility  Impairments Requiring Therapeutic Intervention: decreased functional mobility, decreased ADL status, decreased cognition, decreased endurance, decreased balance, decreased IADL, decreased posture  Prognosis: good  Clinical Assessment: Patient presents with the above deficits, which are below baseline, and would benefit from continued skilled OT to address. Patient with decreased O2 saturations following mobility on 4LO2, RN made aware, ~5 minutes to recover to 89-90%  Safety Interventions: patient left in chair, chair alarm in place, call light within reach, patient at risk for falls and nurse notified    Plan  Frequency: 3-5 x/per week  Current Treatment Recommendations: balance training, functional mobility training, transfer training, endurance training, patient/caregiver education, ADL/self-care training, IADL training, home management training, safety education and equipment evaluation/education    Goals  Patient Goals:  To return home safely   Short Term Goals:  Time Frame: Upon discharge  Patient will complete lower body ADL at modified independent   Patient will complete toileting at modified independent   Patient will complete grooming at modified independent   Patient will complete functional transfers at modified independent   Patient will complete functional mobility at modified independent   Patient will increase functional standing balance to 10-12 minutes mod I for improved ADL completion    Therapy Session Time     Individual Group Co-treatment   Time In    0935   Time Out    1038   Minutes    63        Timed Code Treatment Minutes:   48  Total Treatment Minutes:  63       Electronically Signed By: JAXSON Cr/AFRICA [id number]

## 2022-06-14 NOTE — PROGRESS NOTES
PULMONARY AND CRITICAL CARE MEDICINE PROGRESS NOTE    Subjective: Patient sitting in bed in no apparent respiratory distress. Reports that her breathing is slightly better. Oxgyen supplementation currently at 6L/min with SpO2 in mid to high 90s. Reports of some throat soreness. REVIEW OF SYSTEMS:   8 point review of system is negative except that mentioned in the subjective portion. PAST MEDICAL HISTORY:   Past Medical History:   Diagnosis Date    Cataracts, bilateral     Cervical cancer screening     Nml per pt'    COPD (chronic obstructive pulmonary disease) ([facility] [address]     Portable oxygen:2 L/min    Gall stones     H/O colonoscopy     polyp    H/O mammogram     Nml per pt'.  Hypertension 2010    Morbid obesity with BMI of 40.0-44.9, adult ([facility] [address] 2020       PAST SURGICAL HISTORY:   Past Surgical History:   Procedure Laterality Date    COLON SURGERY      colon cancer:s/p surgery per pt'       SOCIAL HISTORY:   Social History     Tobacco Use    Smoking status: Former Smoker     Packs/day: 1.00     Years: 30.00     Pack years: 30.00     Types: Cigarettes     Quit date: 10/14/1995     Years since quittin.6    Smokeless tobacco: Never Used   Vaping Use    Vaping Use: Never used   Substance Use Topics    Alcohol use: No    Drug use: No       FAMILY HISTORY:   Family History   Problem Relation Age of Onset    Heart Disease Father     No Known Problems Mother     Cancer Sister     Arrhythmia Brother     No Known Problems Maternal Grandmother     No Known Problems Maternal Grandfather     No Known Problems Paternal Grandmother     No Known Problems Paternal Grandfather     No Known Problems Other     Cancer Sister     Cancer Brother     Cancer Brother        MEDICATIONS:     No current facility-administered medications on file prior to encounter.      Current Outpatient Medications on File Prior to Encounter   Medication Sig Dispense Refill    hydroCHLOROthiazide (HYDRODIURIL) 25 MG tablet Take 25 mg by mouth daily      fluticasone-salmeterol (ADVAIR DISKUS) 250-50 MCG/DOSE AEPB Inhale 1 puff into the lungs every 12 hours      albuterol sulfate  (90 BASE) MCG/ACT inhaler Inhale 2 puffs into the lungs every 4 hours as needed for Wheezing or Shortness of Breath 1 Inhaler 11    amLODIPine (NORVASC) 10 MG tablet TAKE 1 TABLET BY MOUTH DAILY 30 tablet 0    SPIRIVA HANDIHALER 18 MCG inhalation capsule INHALE 1 CAPSULE INTO THE LUNGS DAILY USING THE HANDIHALER 30 capsule 0    metoprolol tartrate (LOPRESSOR) 25 MG tablet Take 1 tablet by mouth 2 times daily (Patient taking differently: Take 50 mg by mouth daily ) 60 tablet 1    ketoconazole (NIZORAL) 2 % cream Apply topically daily Apply topically daily.  pantoprazole  40 mg Oral BID AC    budesonide  0.5 mg Nebulization BID    Arformoterol Tartrate  15 mcg Nebulization BID    methylPREDNISolone  40 mg IntraVENous Q8H    ipratropium-albuterol  1 ampule Inhalation Q4H    sodium chloride flush  5-40 mL IntraVENous 2 times per day    amLODIPine  10 mg Oral Nightly    metoprolol tartrate  50 mg Oral BID      sodium chloride      sodium chloride      sodium chloride 25 mL (06/14/22 0841)     sodium chloride, albuterol, sodium chloride, sodium chloride flush, sodium chloride, ondansetron **OR** ondansetron, polyethylene glycol, acetaminophen **OR** acetaminophen, labetalol      ALLERGIES:   Allergies as of 06/11/2022 - Fully Reviewed 06/11/2022   Allergen Reaction Noted    Latex  06/11/2022    Banana  06/11/2022    Azithromycin  05/13/2015    Codeine  05/13/2015      OBJECTIVE:   height is 5' 2\" (1.575 m) and weight is 177 lb 3.2 oz (80.4 kg). Her axillary temperature is 98 °F (36.7 °C). Her blood pressure is 126/87 and her pulse is 102 (abnormal). Her respiration is 20 and oxygen saturation is 95%.    I/O this shift:  In: 240 [P.O.:240]  Out: -      PHYSICAL EXAM:    CONSTITUTIONAL: She is a 79 y.o.-year-old who appears her stated age. She is alert and oriented x 3 and in no acute distress. HEENT: PERRLA, EOMI. No scleral icterus. No thrush, atraumatic, normocephalic. Mild oral erythema seen. NECK: Supple, without cervical or supraclavicular lymphadenopathy:  CARDIOVASCULAR: S1 S2 RRR. Without murmer  RESPIRATORY & CHEST: Bilateral scattered wheezing heard. GASTROINTESTINAL & ABDOMEN: Soft, nontender, positive bowel sounds in all quadrants, non-distended, without hepatosplenomegaly. GENITOURINARY: Deferred. MUSCULOSKELETAL: No tenderness to palpation of the axial skeleton. There is no clubbing. No cyanosis. No edema of the lower extremities. SKIN OF BODY: No rash or jaundice. PSYCHIATRIC EVALUATION: Normal affect. Patient answers questions appropriately. HEMATOLOGIC/LYMPHATIC/ IMMUNOLOGIC: No palpable lymphadenopathy. NEUROLOGIC: Alert and oriented x 3. Groslly non-focal. Motor strength is 5+/5 in all muscle groups. The patient has a normal sensorium globally. LABS:  Recent Labs     06/11/22  1640 06/11/22  2205 06/12/22  0643 06/12/22  1304 06/13/22  0823 06/13/22  1326 06/13/22  2140 06/14/22  0220 06/14/22  0952   WBC 6.5  --  4.6  --  13.0*  --   --  12.2*  --    HGB 6.2*   < > 7.4*   < > 8.4*   < > 8.1* 8.4* 9.1*   HCT 20.8*   < > 24.3*   < > 27.7*   < > 26.5* 26.6* 29.2*   PLT 52*  --  50*  --  90*  --   --  85*  --    ALT 16  --   --   --   --   --   --   --   --    AST 25  --   --   --   --   --   --   --   --      --  139  --  139  --   --  142  --    K 3.7  --  3.7  --  3.9  --   --  3.5  --    CL 96*  --  99  --  101  --   --  100  --    CREATININE 1.4*  --  1.3*  --  1.3*  --   --  1.3*  --    BUN 25*  --  23*  --  25*  --   --  27*  --    CO2 32  --  31  --  28  --   --  32  --     < > = values in this interval not displayed.        Recent Labs     06/12/22  0643 06/13/22  0823 06/14/22  0220   GLUCOSE 151* 143* 146*   CALCIUM 9.3 9.5 9.6    139 142   K 3.7 3.9 3.5   CO2 31 28 32   CL 99 101 100   BUN 23* 25* 27*   CREATININE 1.3* 1.3* 1.3*       Recent Labs     06/12/22  1824   PHART 7.393   EEH8LMF 50.0*   PO2ART 69.2*   UHT3JRF 30.4*   S5HJQXIR 94.7   BEART 5.0*       Lab Results   Component Value Date    INR 1.14 09/19/2020    INR 1.05 06/28/2015    PROTIME 13.3 (H) 09/19/2020    PROTIME 11.4 06/28/2015     Lab Results   Component Value Date    AMYLASE 74 02/16/2013      No results found for: LABA1C  No results found for: EAG  No results found for: TSH, U7JSUNU, V8WRZNP, THYROIDAB, FT3, T4FREE  Lab Results   Component Value Date    TROPONINI <0.01 06/11/2022      No results found for: CRP   Lab Results   Component Value Date    BNP 66.1 05/28/2013      Lab Results   Component Value Date    DDIMER >5250 (H) 06/28/2015      Lab Results   Component Value Date    FERRITIN 22.3 06/11/2022      Lab Results   Component Value Date    LACTA 1.0 09/19/2020           IMAGING:    Narrative   EXAMINATION:   ONE XRAY VIEW OF THE CHEST       6/12/2022 6:06 pm           FINDINGS:   Cardiac silhouette enlargement again demonstrated.  Emphysematous changes   again noted.  Vascular congestion is similar compared to the prior exam.  No   evidence for overt edema.  No consolidation identified.  No pneumothorax or   significant effusion.           Impression   Chronic findings in the chest.  Similar appearance of vascular prominence   without overt edema.             IMPRESSION:     1. COPD of unknown severity in mild exacerbation  2. Severe symptomatic anemia  3. Possible GI bleed  4. Acute on chronic hypoxic respiratory failure  5. Previous history of tobacco abuse    RECOMMENDATION:     1. Patient has presented to the hospital with shortness of breath and generalized fatigue. 2. Suspect that she has a combination of mild COPD exacerbation and severe anemia leading to above symptoms. 3. Continue with Pulmicort, Brovana nebulization twice a day.   4. She can continue to receive DuoNeb around-the-clock. 5. Currently Solu-Medrol is going at 40 mg IV every 8 hours. Decrease it to every 12 hours today. 6. I have titrated her oxygen supplementation down to 4 L/min which is her baseline oxygen requirement. Titrate to maintain SPO2 between 88 to 92%. 7. Low suspicion for pulm infection. No indications for antibiotics. 8. I will add magic mouthwash and nasal saline spray. 9. Her pulmonary status is stabilizing. She can undergo EGD tomorrow. 10. Out of bed into chair. 11. Encourage incentive spirometry. We will continue to follow the patient. Nano Peacock MD  Pulmonary Critical Care and Sleep Medicine  6/11/2022, 12:05 PM    This note was completed using dragon medical speech recognition software. Grammatical errors, random word insertions, pronoun errors and incomplete sentences are occasional consequences of this technology due to software limitations. If there are questions or concerns about the content of this note of information contained within the body of this dictation they should be addressed with the provider for clarification.

## 2022-06-14 NOTE — PROGRESS NOTES
MD Ilana Fuentes MD Bianca Jaeger, MD                                  Office: [phone]                 Fax: [phone]          [employer]                     NEPHROLOGY IN PATIENT PROGRESS NOTE:     PATIENT NAME: Berny Aguirre  : 1940  MRN: [id number]            Subjective:     Doing better. Less shortness of breath. No hypotension. Work-up for anemia of GI loss      Assessment and Plan    Assessment:   Acute kidney injury-mild to moderate- remained stable  Hypovolemia  Severe anemia-possible anemia of GI bleed. History of uncontrolled hypertension. COPD.              Plan:       Acute kidney injury-mild to moderate-creatinine is 1.4 on admission.  - Etiology likely multifactorial.  - Most likely prerenal due to volume depletion.  - Get ultrasound scan to exclude any urinary retention/hydronephrosis. Less likely due to ATN. Repeat creatinine is 1.3- mild improvement after IV fluids       Renal Ultrasound Scan  is pending to exclude any urinary retention    Hypertension. Appears well controlled. - Monitor for hypotension from hypovolemia.  - Hold ACE inhibitor till renal function stabilized. Continue current medications.     Anemia-severe-hemoglobin 7.4 on admission. GI evaluating.  - Rule out possible anemia of GI bleed. .  - Check iron studies.  - May require blood transfusion if any further worsening     Volume status-mild fluid overload. - Patient has lower extremity edema. - Could be related to amlodipine.  - Exclude any significant proteinuria.     Current medications reviewed and appropriate. - Discussed with patient. EXAM  Vitals:    22 1119   BP:    Pulse: (!) 102   Resp: 20   Temp:    SpO2: 95%       Intake/Output Summary (Last 24 hours) at 2022 1158  Last data filed at 2022 0830  Gross per 24 hour   Intake 240 ml   Output 1000 ml   Net -760 ml       ill appearing. mild respiratory distress.   Awake alert   no hypotension  External exam of the ears and nose are normal  HENT: exam is normal  Eyes: Pupils are equal, round, and reactive to light. Lymph Nodes. No axillary or cervical lymph nodes are palpable. Neck. JVD not visible. No lymph nodes palpable. CVS.  Heart sounds are normal.Palpation of the heart is normal. No murmurs. No pericardial rub.  RS.dullness on percussion of the lower chest wall. Bilateral Basal rales. PA soft , bowel sounds are normal no distension and no tenderness to palpation. Skin No rash , No palpable nodules  Musculoskeletal: Normal range of motion. 1+ edema and no tenderness. CNS  No focal    Edemaimproved. More awake and alert. All pulses are well felt      Principal Problem:    GI hemorrhage  Resolved Problems:    * No resolved hospital problems. *        Medications Reviewed by me   pantoprazole  40 mg Oral BID AC    budesonide  0.5 mg Nebulization BID    Arformoterol Tartrate  15 mcg Nebulization BID    methylPREDNISolone  40 mg IntraVENous Q8H    ipratropium-albuterol  1 ampule Inhalation Q4H    sodium chloride flush  5-40 mL IntraVENous 2 times per day    amLODIPine  10 mg Oral Nightly    metoprolol tartrate  50 mg Oral BID      sodium chloride      sodium chloride      sodium chloride 25 mL (06/14/22 0841)       Data Review. Labs reviewed by me       CBC:   Recent Labs     06/12/22  0643 06/12/22  1304 06/13/22  0823 06/13/22  1326 06/13/22  2140 06/14/22  0220 06/14/22  0952   WBC 4.6  --  13.0*  --   --  12.2*  --    HGB 7.4*   < > 8.4*   < > 8.1* 8.4* 9.1*   HCT 24.3*   < > 27.7*   < > 26.5* 26.6* 29.2*   MCV 74.0*  --  76.9*  --   --  75.8*  --    PLT 50*  --  90*  --   --  85*  --     < > = values in this interval not displayed.      BMP:   Recent Labs     06/12/22  0643 06/13/22  0823 06/14/22  0220    139 142   K 3.7 3.9 3.5   CL 99 101 100   CO2 31 28 32   BUN 23* 25* 27*   CREATININE 1.3* 1.3* 1.3*     Magnesium:   Lab Results   Component Value Date    MG 1.70 09/20/2020    MG 1.90 09/19/2020    MG 2.00 06/30/2015     Lab Results   Component Value Date    CREATININE 1.3 06/14/2022       Arterial Blood Gasses  No results for input(s): PH, PCO2, PO2 in the last 72 hours. Invalid input(s): [id number], INSPIREDO2    UA:  Recent Labs     06/13/22  0605   COLORU Yellow   PHUR 5.0   CLARITYU Clear   SPECGRAV 1.010   LEUKOCYTESUR Negative   UROBILINOGEN 0.2   BILIRUBINUR Negative   BLOODU Negative   GLUCOSEU Negative       LIVER PROFILE:   Recent Labs     06/11/22  1640   AST 25   ALT 16   BILITOT 0.4   ALKPHOS 111     PT/INR:    Lab Results   Component Value Date    PROTIME 13.3 09/19/2020    PROTIME 11.4 06/28/2015    INR 1.14 09/19/2020    INR 1.05 06/28/2015     PTT:    Lab Results   Component Value Date    APTT 26.2 09/19/2020     COCO:    Lab Results   Component Value Date    COCO Negative 09/20/2020     CHEMISTRY COMMON GROUP :   Lab Results   Component Value Date    GLUCOSE 146 06/14/2022     Recent Labs     06/11/22  1640 06/12/22  0643 06/13/22  0823 06/14/22  0220   GLUCOSE 135* 151* 143* 146*   CALCIUM 9.6 9.3 9.5 9.6         RADIOLOGY:        Imaging Results. Chest X Ray reviwed by me    Chest Xray Reviewed by me  Renal Ultrasound Reviewed by me    EKG reviewed by me.                 Electronically Signed: Haroon Melendez MD 6/14/2022 11:58 AM

## 2022-06-15 PROBLEM — D50.0 BLOOD LOSS ANEMIA: Status: ACTIVE | Noted: 2020-09-22

## 2022-06-15 NOTE — PROGRESS NOTES
PULMONARY AND CRITICAL CARE MEDICINE PROGRESS NOTE    Subjective: Patient sitting in bed in no apparent respiratory distress. Continues to report stable breathing. Cough and expectoration also improving. Remains on 6 L/min of oxygen supplementation with oxygen saturation in mid 90s. With exertion she does desaturate. Plan to undergo EGD and colonoscopy today. REVIEW OF SYSTEMS:   8 point review of system is negative except that mentioned in the subjective portion. PAST MEDICAL HISTORY:   Past Medical History:   Diagnosis Date    Cataracts, bilateral     Cervical cancer screening     Nml per pt'    COPD (chronic obstructive pulmonary disease) ([facility] [address]     Portable oxygen:2 L/min    Gall stones     H/O colonoscopy     polyp    H/O mammogram     Nml per pt'.  Hypertension 2010    Morbid [facility] with [facility] of 40.0-44.9, [facility] 2020       PAST SURGICAL HISTORY:   Past Surgical History:   Procedure Laterality Date    COLON SURGERY      colon cancer:s/p surgery per pt'       SOCIAL HISTORY:   Social History     Tobacco Use    Smoking status: Former Smoker     Packs/day: 1.00     Years: 30.00     Pack years: 30.00     Types: Cigarettes     Quit date: 10/14/1995     Years since quittin.6    Smokeless tobacco: Never Used   Vaping Use    Vaping Use: Never used   Substance Use Topics    Alcohol use: No    Drug use: No       FAMILY HISTORY:   Family History   Problem Relation Age of Onset    Heart Disease Father     No Known Problems Mother     Cancer Sister     Arrhythmia Brother     No Known Problems Maternal Grandmother     No Known Problems Maternal Grandfather     No Known Problems Paternal Grandmother     No Known Problems Paternal Grandfather     No Known Problems Other     Cancer Sister     Cancer Brother     Cancer Brother        MEDICATIONS:     No current facility-administered medications on file prior to encounter.      Current Outpatient Medications on File Prior to Encounter   Medication Sig Dispense Refill    hydroCHLOROthiazide (HYDRODIURIL) 25 MG tablet Take 25 mg by mouth daily      fluticasone-salmeterol (ADVAIR DISKUS) 250-50 MCG/DOSE AEPB Inhale 1 puff into the lungs every 12 hours      albuterol sulfate  (90 BASE) MCG/ACT inhaler Inhale 2 puffs into the lungs every 4 hours as needed for Wheezing or Shortness of Breath 1 Inhaler 11    amLODIPine (NORVASC) 10 MG tablet TAKE 1 TABLET BY MOUTH DAILY 30 tablet 0    SPIRIVA HANDIHALER 18 MCG inhalation capsule INHALE 1 CAPSULE INTO THE LUNGS DAILY USING THE HANDIHALER 30 capsule 0    metoprolol tartrate (LOPRESSOR) 25 MG tablet Take 1 tablet by mouth 2 times daily (Patient taking differently: Take 50 mg by mouth daily ) 60 tablet 1    ketoconazole (NIZORAL) 2 % cream Apply topically daily Apply topically daily.  pantoprazole  40 mg Oral BID AC    budesonide  0.5 mg Nebulization BID    Arformoterol Tartrate  15 mcg Nebulization BID    methylPREDNISolone  40 mg IntraVENous Q8H    ipratropium-albuterol  1 ampule Inhalation Q4H    sodium chloride flush  5-40 mL IntraVENous 2 times per day    amLODIPine  10 mg Oral Nightly    metoprolol tartrate  50 mg Oral BID      sodium chloride      sodium chloride      sodium chloride 25 mL (06/14/22 0841)     sodium chloride, albuterol, sodium chloride, sodium chloride flush, sodium chloride, ondansetron **OR** ondansetron, polyethylene glycol, acetaminophen **OR** acetaminophen, labetalol      ALLERGIES:   Allergies as of 06/11/2022 - Fully Reviewed 06/11/2022   Allergen Reaction Noted    Latex  06/11/2022    Banana  06/11/2022    Azithromycin  05/13/2015    Codeine  05/13/2015      OBJECTIVE:   height is 5' 2\" (1.575 m) and weight is 177 lb 3.2 oz (80.4 kg). Her oral temperature is 98.3 °F (36.8 °C). Her blood pressure is 144/67 (abnormal) and her pulse is 93. Her respiration is 18 and oxygen saturation is 95%.    No intake/output data recorded. PHYSICAL EXAM:    CONSTITUTIONAL: She is a 79y.o.-year-old who appears her stated age. She is alert and oriented x 3 and in no acute distress. HEENT: PERRLA, EOMI. No scleral icterus. No thrush, atraumatic, normocephalic. Mild oral erythema seen. NECK: Supple, without cervical or supraclavicular lymphadenopathy:  CARDIOVASCULAR: S1 S2 RRR. Without murmer  RESPIRATORY & CHEST: Bilateral scattered wheezing heard. GASTROINTESTINAL & ABDOMEN: Soft, nontender, positive bowel sounds in all quadrants, non-distended, without hepatosplenomegaly. GENITOURINARY: Deferred. MUSCULOSKELETAL: No tenderness to palpation of the axial skeleton. There is no clubbing. No cyanosis. No edema of the lower extremities. SKIN OF BODY: No rash or jaundice. PSYCHIATRIC EVALUATION: Normal affect. Patient answers questions appropriately. HEMATOLOGIC/LYMPHATIC/ IMMUNOLOGIC: No palpable lymphadenopathy. NEUROLOGIC: Alert and oriented x 3. Groslly non-focal. Motor strength is 5+/5 in all muscle groups. The patient has a normal sensorium globally. LABS:  Recent Labs     06/13/22  0823 06/13/22  1326 06/13/22  2140 06/14/22  0220 06/14/22  0952   WBC 13.0*  --   --  12.2*  --    HGB 8.4*   < > 8.1* 8.4* 9.1*   HCT 27.7*   < > 26.5* 26.6* 29.2*   PLT 90*  --   --  85*  --      --   --  142  --    K 3.9  --   --  3.5  --      --   --  100  --    CREATININE 1.3*  --   --  1.3*  --    BUN 25*  --   --  27*  --    CO2 28  --   --  32  --     < > = values in this interval not displayed.        Recent Labs     06/13/22  0823 06/14/22  0220   GLUCOSE 143* 146*   CALCIUM 9.5 9.6    142   K 3.9 3.5   CO2 28 32    100   BUN 25* 27*   CREATININE 1.3* 1.3*       Recent Labs     06/12/22  1824   PHART 7.393   GKG2OOZ 50.0*   PO2ART 69.2*   KUH2RFL 30.4*   M5SGHNFX 94.7   BEART 5.0*       Lab Results   Component Value Date    INR 1.14 09/19/2020    INR 1.05 06/28/2015    PROTIME 13.3 (H) 09/19/2020    PROTIME 11.4 06/28/2015     Lab Results   Component Value Date    AMYLASE 74 02/16/2013      No results found for: LABA1C  No results found for: EAG  No results found for: TSH, B6ZEAIX, K9PJUPF, THYROIDAB, FT3, T4FREE  Lab Results   Component Value Date    TROPONINI <0.01 06/11/2022      No results found for: CRP   Lab Results   Component Value Date    BNP 66.1 05/28/2013      Lab Results   Component Value Date    DDIMER >5250 (H) 06/28/2015      Lab Results   Component Value Date    FERRITIN 22.3 06/11/2022      Lab Results   Component Value Date    LACTA 1.0 09/19/2020           IMAGING:    Narrative   EXAMINATION:   ONE XRAY VIEW OF THE CHEST       6/12/2022 6:06 pm           FINDINGS:   Cardiac silhouette enlargement again demonstrated.  Emphysematous changes   again noted.  Vascular congestion is similar compared to the prior exam.  No   evidence for overt edema.  No consolidation identified.  No pneumothorax or   significant effusion.           Impression   Chronic findings in the chest.  Similar appearance of vascular prominence   without overt edema.             IMPRESSION:     1. COPD of unknown severity in mild exacerbation  2. Severe symptomatic anemia  3. Possible GI bleed  4. Acute on chronic hypoxic respiratory failure  5. Previous history of tobacco abuse    RECOMMENDATION:     1. Patient's respiratory status is slowly stabilizing. 2. Suspect that she has a combination of mild COPD exacerbation and severe anemia leading to persistent shortness of breath and hypoxic respiratory failure. 3. Continue with Pulmicort, Brovana nebulization twice a day. 4. She can continue to receive DuoNeb around-the-clock. Provide her with 1 breathing treatment prior to going for endoscopy/colonoscopy today. 5. Continue with Solu-Medrol is going at 40 mg IV every 8 hours. 6. Titrate oxygen flow to maintain SPO2 between 88 to 92%. 7. Low suspicion for pulm infection.   No indications for antibiotics. 8. Continue with magic mouthwash and nasal saline spray. 9. Out of bed into chair. 10. Encourage incentive spirometry. We will continue to follow the patient. Nikolay Hahn MD  Pulmonary Critical Care and Sleep Medicine  6/11/2022, 10:56 AM    This note was completed using dragon medical speech recognition software. Grammatical errors, random word insertions, pronoun errors and incomplete sentences are occasional consequences of this technology due to software limitations. If there are questions or concerns about the content of this note of information contained within the body of this dictation they should be addressed with the provider for clarification.

## 2022-06-15 NOTE — FLOWSHEET NOTE
Patient scheduled for EGD/Colonoscopy today. Patient attempted to drink Golytely bowel prep and drink all but approx 300ml. Patient tolerated poorly and had scant amount of result. Patient refuses to complete drink stating \"it has made me sick\"  Patient received zofran earlier in shift for nausea and reports \"zofran did not help\"  Nabor Listen Dr Diann Cervantes for Dr Catrachita Sandoval and notified that patient did not complete Golyetly and refuses to drink rest of bowel prep and has had only scant amount of Bm. No new orders received at this time. Obtained signed consent for procedure and anesthia consents which are placed in patient's chart.

## 2022-06-15 NOTE — PLAN OF CARE
Consult received for right colon mass. Tentatively scheduled for laparoscopic right colon resection on Friday with Dr. Liudmila Charlton. Will see patient tomorrow. Plans discussed with nursing. Reviewed and discussed with Dr. Liudmila Charlton.       Signed:  SHAN Vickers CNP  6/15/2022 3:47 PM

## 2022-06-15 NOTE — ANESTHESIA PRE PROCEDURE
Department of Anesthesiology  Preprocedure Note       Name:  Shar Locke   Age:  79 y.o.  :  1940                                          MRN:  [id number]         Date:  6/15/2022      Surgeon: Daniela Calzada):  Karyna Puri MD    Procedure: Procedure(s):  EGD DIAGNOSTIC ONLY  COLONOSCOPY DIAGNOSTIC    Medications prior to admission:   Prior to Admission medications    Medication Sig Start Date End Date Taking? Authorizing Provider   hydroCHLOROthiazide (HYDRODIURIL) 25 MG tablet Take 25 mg by mouth daily   Yes Historical Provider, MD   fluticasone-salmeterol (ADVAIR DISKUS) 250-50 MCG/DOSE AEPB Inhale 1 puff into the lungs every 12 hours    Historical Provider, MD   albuterol sulfate  (90 BASE) MCG/ACT inhaler Inhale 2 puffs into the lungs every 4 hours as needed for Wheezing or Shortness of Breath 5/15/17   Dennis Mckay MD   amLODIPine (NORVASC) 10 MG tablet TAKE 1 TABLET BY MOUTH DAILY 3/17/17   Shun Magana MD   Angela Maru 18 MCG inhalation capsule INHALE 1 CAPSULE INTO THE LUNGS DAILY USING THE HANDIHALER 3/17/17   Shun Magana MD   metoprolol tartrate (LOPRESSOR) 25 MG tablet Take 1 tablet by mouth 2 times daily  Patient taking differently: Take 50 mg by mouth daily  10/14/16   Shun Magana MD   ketoconazole (NIZORAL) 2 % cream Apply topically daily Apply topically daily.     Historical Provider, MD       Current medications:    Current Facility-Administered Medications   Medication Dose Route Frequency Provider Last Rate Last Admin    0.9 % sodium chloride infusion   IntraVENous PRN SHAN Carson CNP        pantoprazole (PROTONIX) tablet 40 mg  40 mg Oral BID AC Lamont Johnson MD   40 mg at 22 1607    budesonide (PULMICORT) nebulizer suspension 500 mcg  0.5 mg Nebulization BID SHAN Jimenez CNP   500 mcg at 06/15/22 0827    Arformoterol Tartrate (BROVANA) nebulizer solution 15 mcg  15 mcg Nebulization BID SHAN Jimenez CNP HTN (hypertension) I10    Enlarged thyroid & cyst per CT chest 2015 E04.9    Centrilobular emphysema (HCC) J43.2    Chronic respiratory failure with hypoxia (HCC) J96.11    Solitary pulmonary nodule R91.1    Cholelithiasis K80.20    Acute cholecystitis K81.0    Thrombocytopenia (HCC) D69.6    Morbid obesity with BMI of 40.0-44.9, adult (HCC) E66.01, Z68.41    COPD (chronic obstructive pulmonary disease) (HCC) J44.9    Anemia D64.9    GI hemorrhage K92.2       Past Medical History:        Diagnosis Date    Cataracts, bilateral     Cervical cancer screening     Nml per pt'    COPD (chronic obstructive pulmonary disease) ([facility] [address]     Portable oxygen:2 L/min    Gall stones     H/O colonoscopy     polyp    H/O mammogram     Nml per pt'.  Hypertension 2010    Morbid obesity with BMI of 40.0-44.9, adult ([facility] [address] 2020       Past Surgical History:        Procedure Laterality Date    COLON SURGERY      colon cancer:s/p surgery per pt'       Social History:    Social History     Tobacco Use    Smoking status: Former Smoker     Packs/day: 1.00     Years: 30.00     Pack years: 30.00     Types: Cigarettes     Quit date: 10/14/1995     Years since quittin.6    Smokeless tobacco: Never Used   Substance Use Topics    Alcohol use:  No                                Counseling given: Not Answered      Vital Signs (Current):   Vitals:    06/15/22 0807 06/15/22 0840 06/15/22 0859 06/15/22 1104   BP:   (!) 144/67    Pulse: 93      Resp:       Temp:       TempSrc:       SpO2:  95%  92%   Weight:       Height:                                                  BP Readings from Last 3 Encounters:   06/15/22 (!) 144/67   20 (!) 146/68   10/02/20 (!) 154/70       NPO Status:                                                                                 BMI:   Wt Readings from Last 3 Encounters:   22 177 lb 3.2 oz (80.4 kg)   20 184 lb (83.5 kg)   10/02/20 180 lb (81.6 kg)     Body mass index is 32.41 kg/m². CBC:   Lab Results   Component Value Date    WBC 17.5 06/15/2022    RBC 3.67 06/15/2022    HGB 8.7 06/15/2022    HCT 28.6 06/15/2022    MCV 78.1 06/15/2022    RDW 19.1 06/15/2022     06/15/2022       CMP:   Lab Results   Component Value Date     06/15/2022    K 4.4 06/15/2022    K 3.7 06/11/2022    CL 96 06/15/2022    CO2 37 06/15/2022    BUN 34 06/15/2022    CREATININE 1.4 06/15/2022    GFRAA 43 06/15/2022    GFRAA >60 05/29/2013    AGRATIO 1.8 06/11/2022    LABGLOM 36 06/15/2022    GLUCOSE 98 06/15/2022    PROT 7.1 06/11/2022    PROT 6.8 02/16/2013    CALCIUM 9.6 06/15/2022    BILITOT 0.4 06/11/2022    ALKPHOS 111 06/11/2022    AST 25 06/11/2022    ALT 16 06/11/2022       POC Tests: No results for input(s): POCGLU, POCNA, POCK, POCCL, POCBUN, POCHEMO, POCHCT in the last 72 hours.     Coags:   Lab Results   Component Value Date    PROTIME 13.3 09/19/2020    INR 1.14 09/19/2020    APTT 26.2 09/19/2020       HCG (If Applicable): No results found for: PREGTESTUR, PREGSERUM, HCG, HCGQUANT     ABGs:   Lab Results   Component Value Date    PHART 7.393 06/12/2022    PO2ART 69.2 06/12/2022    SGE1GEQ 50.0 06/12/2022    CTR3DVI 30.4 06/12/2022    BEART 5.0 06/12/2022    F9VSBSIH 94.7 06/12/2022        Type & Screen (If Applicable):  No results found for: LABABO, LABRH    Drug/Infectious Status (If Applicable):  No results found for: HIV, HEPCAB    COVID-19 Screening (If Applicable):   Lab Results   Component Value Date    COVID19 NOT DETECTED 06/11/2022           Anesthesia Evaluation  Patient summary reviewed and Nursing notes reviewed no history of anesthetic complications:   Airway: Mallampati: II  TM distance: >3 FB   Neck ROM: full  Mouth opening: > = 3 FB   Dental:    (+) upper dentures and lower dentures      Pulmonary:   (+) COPD:      (-) asthma and shortness of breath                           Cardiovascular:    (+) hypertension:,     (-)  angina Neuro/Psych:      (-) CVA           GI/Hepatic/Renal:        (-) GERD and liver disease       Endo/Other:    (+) blood dyscrasia: anemia:., .    (-) diabetes mellitus, hypothyroidism               Abdominal:             Vascular:     - PVD. Other Findings:           Anesthesia Plan      MAC     ASA 4     (COPD with exacerbation, anemia, on 6 L O2; duoneb)  Induction: intravenous. Anesthetic plan and risks discussed with patient. Plan discussed with CRNA.                     Evelia Andrews MD   6/15/2022

## 2022-06-15 NOTE — PROGRESS NOTES
[facility] HOSPITALISTS PROGRESS NOTE    6/15/2022 10:35 AM        Name: Mimi Johnson . Admitted: 6/11/2022  Primary Care Provider: Tae Chris MD (Tel: [phone])      Chief Complaint   Patient presents with    Shortness of Breath     Using nebulizer without relief; increasing SOB; just finished abx from urgent care; brought in by [facility]. Brief History: Patient is an 78 yo female with hx COPD, chronic hypoxic respiratory failure on 3-4 liters continuously, HTN. She presented to ER with increasing shortness of breath and fatigue which have been progressively worsening over past several weeks. Found to have severe anemia with Hgb 6.2, stool guaiac positive. COVID and influenza screen negative. Subjective: Sitting on side of bed, grand daughter visiting. NPO for EGD and colonoscopy today. AM labs pending. Reports improvement in shortness of breath but not at baseline, becomes easily winded with minimal activity. Remains on IV solumedrol.      Reviewed interval ancillary notes    Current Medications  0.9 % sodium chloride infusion, PRN  pantoprazole (PROTONIX) tablet 40 mg, BID AC  budesonide (PULMICORT) nebulizer suspension 500 mcg, BID  Arformoterol Tartrate (BROVANA) nebulizer solution 15 mcg, BID  methylPREDNISolone sodium (SOLU-MEDROL) injection 40 mg, Q8H  albuterol (PROVENTIL) nebulizer solution 2.5 mg, Q4H PRN  ipratropium-albuterol (DUONEB) nebulizer solution 1 ampule, Q4H  0.9 % sodium chloride infusion, PRN  sodium chloride flush 0.9 % injection 5-40 mL, 2 times per day  sodium chloride flush 0.9 % injection 5-40 mL, PRN  0.9 % sodium chloride infusion, PRN  ondansetron (ZOFRAN-ODT) disintegrating tablet 4 mg, Q8H PRN   Or  ondansetron (ZOFRAN) injection 4 mg, Q6H PRN  polyethylene glycol (GLYCOLAX) packet 17 g, Daily PRN  acetaminophen (TYLENOL) tablet 650 mg, Q6H PRN   Or  acetaminophen (TYLENOL) List  Principal Problem:    GI hemorrhage  Resolved Problems:    * No resolved hospital problems. *       Assessment & Plan:   1. Acute blood loss anemia. Hgb 6.2 on presentation and she received 1 unit PRBCs 6/11 and again 6/13. Stool occult positive. NPO for EGD and colonoscopy today. CBC results pending. Iron level 18, received IV Venofer x 3 doses. CBC results pending from today. 2. Acute COPD exacerbation. Respiratory panel negative, llu and COVID swabs negative. Remains on IV solumedrol q 8 hours, still with wheezes throughout and increased O2 needs. Continue Brovana, Pulmicort, and duonebs. Pulmonary on board. 3. Acute on chronic hypoxic respiratory failure. Secondary to COPD exacerbation. Increased O2 requirements and increased work of breathing. Slow improvement. Currently on 6L NC, baseline 3-4 liters. Treatment as above. 4. HTN. Controlled. HCTZ held on admission considering JUN. Continue Norvasc and metoprolol. 5. JUN. Creatinine 1.4 on admission, baseline 0.9. Received gentle hydration, HCTZ on hold. BMP results pending Nephrology on board. Disposition: PT/OT evaluated and recommend SNF on DC to address ADL and functional mobility deficits but patient declines consideration. She is agreeable to home care and consult placed. Feliciano Wilson was evaluated today and a DME order was entered for a wheeled walker because she requires this to successfully complete daily living tasks of ambulating. A wheeled walker is necessary due to the patient's unsteady gait, upper body weakness, and inability to  an ambulation device; and she can ambulate only by pushing a walker instead of a lesser assistive device such as a cane, crutch, or standard walker.   The need for this equipment was discussed with the patient and she understands and is in agreement.        Diet: Diet NPO  Code:Full Code  DVT PPX: SHAN Her - CNP   6/15/2022 10:35 AM

## 2022-06-15 NOTE — CARE COORDINATION
Discharge Planning Assessment    RN discharge planner met with patient to discuss reason for admission, current living situation, and potential needs at the time of discharge    Demographics/Insurance verified Yes Medicare    Current type of dwelling: apartment    Patient from ECF/SW confirmed with: n/a    Living arrangements: lives alone in apartment    Level of function/Support: daughter and grand daughter live in same home, down stairs    PCP: Dennie Alley    Last Visit to PCP:    DME: none    Active with any community resources/agencies/skilled home care: oxygen [facility] on [place])    Medication compliance issues: none mentioned    Financial issues that could impact healthcare: none mentioned        Tentative discharge plan:  Discussed and provided facilities of choice if transition to a skilled nursing facility is required at the time of discharge    Plan is for patient to return home with family support. Patient stated that family will transport her home. Discussed with patient and/or family that on the day of discharge home tentative time of discharge will be between 10 AM and noon.     Transportation at the time of discharge: family

## 2022-06-15 NOTE — PROGRESS NOTES
VSS. Call light within reach. Assessment complete at this time. No signs of distress. Family at bedside. Pt resting on commode due to bowel prep.

## 2022-06-15 NOTE — BRIEF OP NOTE
Brief Postoperative Note - Full Note in Chart Review/Procedures tab       Patient: Mandi Palacios  YOB: 1940  MRN: [id number]    Date of Procedure: 6/15/2022    Pre-Op Diagnosis: Anemia, unspecified type [D64.9]    Post-Op Diagnosis: Same       Procedure(s):  EGD BIOPSY  COLONOSCOPY WITH BIOPSY  COLONOSCOPY POLYPECTOMY SNARE/COLD BIOPSY  COLONOSCOPY SUBMUCOSAL/ INJECTION    Surgeon(s):  Dolores Christina MD    Assistant:  * No surgical staff found *    Anesthesia: Monitor Anesthesia Care    Estimated Blood Loss (mL): Minimal    Complications: None    Specimens:   ID Type Source Tests Collected by Time Destination   A : A) duodenal bx r/o celiac Tissue Duodenum SURGICAL PATHOLOGY Sahil Peguero RN 6/15/2022 1229    B : B) right colon ulcer bx Tissue Colon SURGICAL PATHOLOGY Sahil Peguero RN 6/15/2022 1245    C : C) transverse colon polyp Tissue Colon SURGICAL PATHOLOGY Sahil Peguero RN 6/15/2022 1252    D : D) sigmoid colon polyp x4 Tissue Colon SURGICAL PATHOLOGY Sahil Peguero RN 6/15/2022 1300        Implants:  * No implants in log *      Drains: * No LDAs found *    Findings:    1) Small amount of adherent hematin scattered over gastric mucosa  2) Otherwise normal Esophagogastroduodenoscopy - duodenal biopsies obtained. 3) 2 - 3 cm ulcerated mass just distal to ileocolonic anastomosis - biopsied. Colon mass site marked with bao ink - total volume 2.5 mL injected at 4 sites around circumference of colon at and just distal to mass. 4) 4 mm polyp of transverse colon - D12.3 removed with cold snare  5) Four 4 mm polyps of sigmoid colon - D12.5 removed with cold snare    Rec:  Clear liquid diet  Continue pantoprazole daily  Continue present treatment. Await pathology results. CT Abd/Pelvis with contrast  Labs for CMP, CEA  Surgery consult for possible right colon resection.   Consider oncology consult if path (+) for adenoCa  Decide on recall colonoscopy based on above results    Electronically signed by Jinny Caceres MD on 6/15/2022 at 1:11 PM

## 2022-06-15 NOTE — PROGRESS NOTES
Reviewed patient's medical and surgical history in electronic record and with patient at the bedside. All questions regarding procedure answered. Scope verified using 2 person system.       Electronically signed by Kavita Herron RN on 6/15/2022 at 12:21 PM

## 2022-06-15 NOTE — PROGRESS NOTES
Abraham Barlow, MD Marylene Pavlov, MD Malka Cara, MD                                  Office: [phone]                 Fax: [phone]          [employer]                     NEPHROLOGY IN PATIENT PROGRESS NOTE:     PATIENT NAME: Raji Tate  : 1940  MRN: [id number]            Subjective:     Doing better. Less shortness of breath. No hypotension. Work-up for anemia of GI loss    Going for endoscopy yesterday   Assessment and Plan    Assessment:   Acute kidney injury-mild to moderate- remained stable  Hypovolemia. Most likely has chronic kidney disease. Stage IIIa. - Hypertensive kidney disease. Severe anemia-possible anemia of GI bleed. History of uncontrolled hypertension. COPD.              Plan:       Acute kidney injury-mild to moderate-creatinine is 1.4 on admission.  - Etiology likely multifactorial.  - Most likely prerenal due to volume depletion.  - Get ultrasound scan to exclude any urinary retention/hydronephrosis. Less likely due to ATN. Repeat creatinine is 1.3- mild improvement after IV fluids       Renal ultrasound scan that I have measured showed significant small size kidneys consistent with hypertensive kidney disease.  - No hydronephrosis. No distended bladder. Hypertension. Appears well controlled. - Monitor for hypotension from hypovolemia.  - Hold ACE inhibitor till renal function stabilized. Continue current medications.     Anemia-severe-hemoglobin 7.4 on admission. GI evaluating.  - Rule out possible anemia of GI bleed. .  - Check iron studies.  - May require blood transfusion if any further worsening     Volume status-mild fluid overload. - Patient has lower extremity edema. - Could be related to amlodipine.  - Exclude any significant proteinuria.     Current medications reviewed and appropriate. - Discussed with patient.                EXAM  Vitals:    06/15/22 1104   BP:    Pulse:    Resp:    Temp:    SpO2: 92% Intake/Output Summary (Last 24 hours) at 6/15/2022 1111  Last data filed at 6/15/2022 0000  Gross per 24 hour   Intake 1200 ml   Output --   Net 1200 ml       ill appearing. mild respiratory distress. Awake alert   no hypotension  External exam of the ears and nose are normal  HENT: exam is normal  Eyes: Pupils are equal, round, and reactive to light. Lymph Nodes. No axillary or cervical lymph nodes are palpable. Neck. JVD not visible. No lymph nodes palpable. CVS.  Heart sounds are normal.Palpation of the heart is normal. No murmurs. No pericardial rub.  RS.dullness on percussion of the lower chest wall. Bilateral Basal rales. PA soft , bowel sounds are normal no distension and no tenderness to palpation. Skin No rash , No palpable nodules  Musculoskeletal: Normal range of motion. 1+ edema and no tenderness. CNS  No focal    Edemaimproved. More awake and alert. All pulses are well felt      Principal Problem:    GI hemorrhage  Resolved Problems:    * No resolved hospital problems. *        Medications Reviewed by me   pantoprazole  40 mg Oral BID AC    budesonide  0.5 mg Nebulization BID    Arformoterol Tartrate  15 mcg Nebulization BID    methylPREDNISolone  40 mg IntraVENous Q8H    ipratropium-albuterol  1 ampule Inhalation Q4H    sodium chloride flush  5-40 mL IntraVENous 2 times per day    amLODIPine  10 mg Oral Nightly    metoprolol tartrate  50 mg Oral BID      sodium chloride      sodium chloride      sodium chloride 25 mL (06/14/22 0841)       Data Review. Labs reviewed by me       CBC:   Recent Labs     06/13/22  0823 06/13/22  1326 06/13/22  2140 06/14/22  0220 06/14/22  0952   WBC 13.0*  --   --  12.2*  --    HGB 8.4*   < > 8.1* 8.4* 9.1*   HCT 27.7*   < > 26.5* 26.6* 29.2*   MCV 76.9*  --   --  75.8*  --    PLT 90*  --   --  85*  --     < > = values in this interval not displayed.      BMP:   Recent Labs     06/13/22  0823 06/14/22 0220    142   K 3.9 3.5   CL 101 100   CO2 28 32   BUN 25* 27*   CREATININE 1.3* 1.3*     Magnesium:   Lab Results   Component Value Date    MG 1.70 09/20/2020    MG 1.90 09/19/2020    MG 2.00 06/30/2015     Lab Results   Component Value Date    CREATININE 1.3 06/14/2022       Arterial Blood Gasses  No results for input(s): PH, PCO2, PO2 in the last 72 hours. Invalid input(s): X6AQXLOHHTCE, INSPIREDO2    UA:  Recent Labs     06/13/22  0605   COLORU Yellow   PHUR 5.0   CLARITYU Clear   SPECGRAV 1.010   LEUKOCYTESUR Negative   UROBILINOGEN 0.2   BILIRUBINUR Negative   BLOODU Negative   GLUCOSEU Negative       LIVER PROFILE:   No results for input(s): AST, ALT, LIPASE, BILIDIR, BILITOT, ALKPHOS in the last 72 hours. Invalid input(s): AMYLASE,  ALB  PT/INR:    Lab Results   Component Value Date    PROTIME 13.3 09/19/2020    PROTIME 11.4 06/28/2015    INR 1.14 09/19/2020    INR 1.05 06/28/2015     PTT:    Lab Results   Component Value Date    APTT 26.2 09/19/2020     COCO:    Lab Results   Component Value Date    COCO Negative 09/20/2020     CHEMISTRY COMMON GROUP :   Lab Results   Component Value Date    GLUCOSE 146 06/14/2022     Recent Labs     06/13/22  0823 06/14/22  0220   GLUCOSE 143* 146*   CALCIUM 9.5 9.6         RADIOLOGY:        Imaging Results. Chest X Ray reviwed by me    Chest Xray Reviewed by me  Renal Ultrasound Reviewed by me    EKG reviewed by me.                 Electronically Signed: Jordan Barrios MD 6/15/2022 11:11 AM

## 2022-06-15 NOTE — DISCHARGE INSTR - COC
Continuity of Care Form    Patient Name: María Powell   :  1940  MRN:  [id number]    Admit date:  2022  Discharge date:  ***    Code Status Order: Full Code   Advance Directives:      Admitting Physician:  Facundo Louis MD  PCP: Patricia Calvillo MD    Discharging Nurse: [facility] Unit/Room#: [id number]/9393-22  Discharging Unit Phone Number: ***    Emergency Contact:   Extended Emergency Contact Information  Primary Emergency Contact: [address] Phone: [phone]  Relation: Child  Secondary Emergency Contact: [address] [facility] Phone: [phone]  Relation: Grandchild    Past Surgical History:  Past Surgical History:   Procedure Laterality Date    COLON SURGERY      colon cancer:s/p surgery per pt'       Immunization History:   Immunization History   Administered Date(s) Administered    Pneumococcal Polysaccharide ([id number]) 10/06/2014       Active Problems:  Patient Active Problem List   Diagnosis Code    Acute on chronic respiratory failure ([address] J96.20    Chronic obstructive pulmonary disease with acute exacerbation (HCC) J44.1    HTN (hypertension) I10    Enlarged thyroid & cyst per CT chest 2015 E04.9    Centrilobular emphysema ([address] J43.2    Chronic respiratory failure with hypoxia ([address] J96.11    Solitary pulmonary nodule R91.1    Cholelithiasis K80.20    Acute cholecystitis K81.0    Thrombocytopenia (HCC) D69.6    Morbid obesity with BMI of 40.0-44.9, adult ([address] E66.01, Z68.41    COPD (chronic obstructive pulmonary disease) ([facility] J44.9    Anemia D64.9    GI hemorrhage K92.2       Isolation/Infection:   Isolation            No Isolation          Patient Infection Status       Infection Onset Added Last Indicated Last Indicated By Review Planned Expiration Resolved Resolved By    None active    Resolved    COVID-19 (Rule Out) 22 Respiratory Panel, Molecular, with COVID-19 (Restricted: peds pts or suitable admitted adults) (Ordered) 06/12/22 Rule-Out Test Resulted    COVID-19 (Rule Out) 06/11/22 06/11/22 06/11/22 COVID-19 & Influenza Combo (Ordered)   06/11/22 Rule-Out Test Resulted            Nurse Assessment:  Last Vital Signs: BP (!) 144/67   Pulse 93   Temp 98.3 °F (36.8 °C) (Oral)   Resp 18   Ht 5' 2\" (1.575 m)   Wt 177 lb 3.2 oz (80.4 kg)   SpO2 95%   BMI 32.41 kg/m²     Last documented pain score (0-10 scale):    Last Weight:   Wt Readings from Last 1 Encounters:   06/12/22 177 lb 3.2 oz (80.4 kg)     Mental Status:  {IP PT MENTAL STATUS:20030}    IV Access:  { ELVA IV ACCESS:821551612}    Nursing Mobility/ADLs:  Walking   {P DME INKQ:217154021}  Transfer  {P DME KTHC:902941167}  Bathing  {P DME TWFP:458940722}  Dressing  {P DME YSEJ:543382870}  Toileting  {P DME IDRU:697788825}  Feeding  {[facility] DME NEUF:468521785}  Med Admin  {[facility] DME YJWN:716614403}  Med Delivery   { ELVA MED Delivery:675776076}    Wound Care Documentation and Therapy:        Elimination:  Continence: Bowel: {YES / JH:02515}  Bladder: {YES / LY:28579}  Urinary Catheter: {Urinary Catheter:648567172}   Colostomy/Ileostomy/Ileal Conduit: {YES / BY:89492}       Date of Last BM: ***    Intake/Output Summary (Last 24 hours) at 6/15/2022 1101  Last data filed at 6/15/2022 0000  Gross per 24 hour   Intake 1200 ml   Output --   Net 1200 ml     I/O last 3 completed shifts:   In: 1440 [P.O.:1440]  Out: 1000 [Urine:1000]    Safety Concerns:     [address] [employer] Safety Concerns:004664465}    Impairments/Disabilities:      [employer] Impairments/Disabilities:438439931}    Nutrition Therapy:  Current Nutrition Therapy:   [employer] Diet List:064213537}    Routes of Feeding: {CHP DME Other Feedings:652007276}  Liquids: {Slp liquid thickness:66305}  Daily Fluid Restriction: {CHP DME Yes amt example:443930416}  Last Modified Barium Swallow with Video (Video Swallowing Test): {Done Not Done KVEW:892572517}    Treatments at the Time of Hospital Discharge:   Respiratory Treatments: ***  Oxygen Therapy:  {Therapy; copd oxygen:89899}  Ventilator:    {MH CC Vent PGLI:786566451}    Rehab Therapies: {THERAPEUTIC INTERVENTION:2563006999}  Weight Bearing Status/Restrictions: [address] CC Weight Bearin}  Other Medical Equipment (for information only, NOT a DME order):  {EQUIPMENT:397286236}  Other Treatments: ***    Patient's personal belongings (please select all that are sent with patient):  {CHP DME Belongings:499102078}    RN SIGNATURE:  {Esignature:435944001}    CASE MANAGEMENT/SOCIAL WORK SECTION    Inpatient Status Date: 2022    Readmission Risk Assessment Score:  Readmission Risk              Risk of Unplanned Readmission:  13           Discharging to [facility]/ [address] [place] [address] (Orthopedic) [address] [place] [address], [facility]  Phone: [phone]  Fax: [phone]           / signature: Electronically signed by Diego Cartwright RN on 22 at 2:02 PM EDT    PHYSICIAN SECTION    Prognosis: Fair    Condition at Discharge: Stable    Rehab Potential (if transferring to Rehab): N/A    Recommended Labs or Other Treatments After Discharge: PT/OT/skilled nursing/home health aide    Physician Certification: I certify the above information and transfer of Najma Armas  is necessary for the continuing treatment of the diagnosis listed and that she requires Home Care for greater 30 days.      Update Admission H&P: No change in H&P    PHYSICIAN SIGNATURE:  Electronically signed by SHAN Ruvalcaba CNP on 6/15/22 at 11:02 AM EDT

## 2022-06-15 NOTE — PLAN OF CARE
Problem: Safety - Adult  Goal: Free from fall injury  Outcome: Progressing  Flowsheets (Taken 6/15/2022 0351)  Free From Fall Injury:   Instruct family/caregiver on patient safety   Based on caregiver fall risk screen, instruct family/caregiver to ask for assistance with transferring infant if caregiver noted to have fall risk factors     Problem: ABCDS Injury Assessment  Goal: Absence of physical injury  Outcome: Progressing  Flowsheets (Taken 6/15/2022 0351)  Absence of Physical Injury: Implement safety measures based on patient assessment

## 2022-06-15 NOTE — PROGRESS NOTES
Report given to 5T RN no further questions at this time. Patient placed on unit telemetry, remains on 6L O2 via NC for transport.  Pt transport to [phone]

## 2022-06-15 NOTE — PROGRESS NOTES
Pt arrived from ENDO to PACU bay 4. Reported received from ENDO staff. Pt does not arouse to voice, s/p anesthesia. Pt on 6L NC, NSR at 77bpm , and VSS. Pt received MAC anesthesia during the procedure.

## 2022-06-15 NOTE — PROGRESS NOTES
Teaching / education initiated regarding perioperative experience, expectations, and pain management during stay. Patient verbalized understanding.     Electronically signed by Selena Gonzales RN on 6/15/2022 at 11:53 AM

## 2022-06-15 NOTE — ANESTHESIA POSTPROCEDURE EVALUATION
Department of Anesthesiology  Postprocedure Note    Patient: Cristina Collins  MRN: [id number]  YOB: 1940  Date of evaluation: 6/15/2022  Time:  1:31 PM     Procedure Summary     Date: 06/15/22 Room / Location: [address] [place] [address]    Anesthesia Start: 1218 Anesthesia Stop: 9497    Procedures:       EGD BIOPSY (N/A Abdomen)      COLONOSCOPY WITH BIOPSY (N/A )      COLONOSCOPY POLYPECTOMY SNARE/COLD BIOPSY      COLONOSCOPY SUBMUCOSAL/ INJECTION Diagnosis:       Anemia, unspecified type      (Anemia, unspecified type [D64.9])    Surgeons: Jinny Caceres MD Responsible Provider: Shantal Robison MD    Anesthesia Type: MAC ASA Status: 4          Anesthesia Type: No value filed. April Phase I: April Score: 8    April Phase II:      Last vitals: Reviewed and per EMR flowsheets. Anesthesia Post Evaluation    Patient location during evaluation: PACU  Level of consciousness: awake  Airway patency: patent  Complications: no  Cardiovascular status: hemodynamically stable  Respiratory status: acceptable  There was medical reason for not using a multimodal analgesia pain management approach.

## 2022-06-16 NOTE — ANESTHESIA PRE PROCEDURE
Department of Anesthesiology  Preprocedure Note       Name:  Donnie Duong   Age:  79 y.o.  :  1940                                          MRN:  [id number]         Date:  2022      Surgeon: Ricky Hillman):  Chelsey Mahoney MD    Procedure: Procedure(s):  LAPAROSCOPIC RIGHT COLON RESECTION    Medications prior to admission:   Prior to Admission medications    Medication Sig Start Date End Date Taking? Authorizing Provider   hydroCHLOROthiazide (HYDRODIURIL) 25 MG tablet Take 25 mg by mouth daily    Historical Provider, MD   fluticasone-salmeterol (ADVAIR DISKUS) 250-50 MCG/DOSE AEPB Inhale 1 puff into the lungs every 12 hours    Historical Provider, MD   albuterol sulfate  (90 BASE) MCG/ACT inhaler Inhale 2 puffs into the lungs every 4 hours as needed for Wheezing or Shortness of Breath 5/15/17   Casandra Mendez MD   amLODIPine (NORVASC) 10 MG tablet TAKE 1 TABLET BY MOUTH DAILY 3/17/17   Matt Haas MD   Tanvi Neer 18 MCG inhalation capsule INHALE 1 CAPSULE INTO THE LUNGS DAILY USING THE HANDIHALER 3/17/17   Matt Haas MD   metoprolol tartrate (LOPRESSOR) 25 MG tablet Take 1 tablet by mouth 2 times daily  Patient taking differently: Take 50 mg by mouth daily  10/14/16   Matt Haas MD   ketoconazole (NIZORAL) 2 % cream Apply topically daily Apply topically daily. Historical Provider, MD       Current medications:    No current facility-administered medications for this visit. No current outpatient medications on file.      Facility-Administered Medications Ordered in Other Visits   Medication Dose Route Frequency Provider Last Rate Last Admin    0.9 % sodium chloride infusion   IntraVENous Continuous Ashlee Cabello MD 75 mL/hr at 06/15/22 1631 New Bag at 06/15/22 1631    0.9 % sodium chloride infusion   IntraVENous PRN Ashlee Cabello MD        pantoprazole (PROTONIX) tablet 40 mg  40 mg Oral BID AC Ashlee Cabello MD   40 mg at 06/15/22 9536    budesonide (PULMICORT) nebulizer suspension 500 mcg  0.5 mg Nebulization BID Edith Le MD   500 mcg at 06/15/22 1958    Arformoterol Tartrate (BROVANA) nebulizer solution 15 mcg  15 mcg Nebulization BID Edith Le MD   15 mcg at 06/15/22 1958    methylPREDNISolone sodium (SOLU-MEDROL) injection 40 mg  40 mg IntraVENous Q8H Edith Le MD   40 mg at 06/16/22 0503    albuterol (PROVENTIL) nebulizer solution 2.5 mg  2.5 mg Nebulization Q4H PRN Edith Le MD        ipratropium-albuterol (DUONEB) nebulizer solution 1 ampule  1 ampule Inhalation Q4H Edith Le MD   1 ampule at 06/16/22 0336    0.9 % sodium chloride infusion   IntraVENous PRN Edith Le MD        sodium chloride flush 0.9 % injection 5-40 mL  5-40 mL IntraVENous 2 times per day Edith Le MD   10 mL at 06/16/22 0104    sodium chloride flush 0.9 % injection 5-40 mL  5-40 mL IntraVENous PRN Edith Le MD        0.9 % sodium chloride infusion   IntraVENous PRN Edith Le  mL/hr at 06/15/22 1218 Restarted at 06/15/22 1306    ondansetron (ZOFRAN-ODT) disintegrating tablet 4 mg  4 mg Oral Q8H PRN Edith Le MD        Or    ondansetron [facility]) injection 4 mg  4 mg IntraVENous Q6H PRN Edith Le MD   4 mg at 06/16/22 0102    polyethylene glycol (GLYCOLAX) packet 17 g  17 g Oral Daily PRN Edith Le MD        acetaminophen (TYLENOL) tablet 650 mg  650 mg Oral Q6H PRN Edith Le MD   650 mg at 06/16/22 0102    Or    acetaminophen (TYLENOL) suppository 650 mg  650 mg Rectal Q6H PRN Edith Le MD        amLODIPine (NORVASC) tablet 10 mg  10 mg Oral Nightly Edith Le MD   10 mg at 06/15/22 2000    metoprolol tartrate (LOPRESSOR) tablet 50 mg  50 mg Oral BID Edith Le MD   50 mg at 06/16/22 0102    labetalol (NORMODYNE;TRANDATE) injection 5 mg  5 mg IntraVENous Q4H PRN Edith Le MD   5 mg at 06/15/22 0012       Allergies: Allergies   Allergen Reactions    Latex      Skin turns red    Banana     Azithromycin     Codeine      \"sweating\"       Problem List:    Patient Active Problem List   Diagnosis Code    Acute on chronic respiratory failure (HCC) J96.20    COPD exacerbation (HCC) J44.1    HTN (hypertension) I10    Enlarged thyroid & cyst per CT chest 6/2015 E04.9    Centrilobular emphysema (HCC) J43.2    Chronic respiratory failure with hypoxia (HCC) J96.11    Solitary pulmonary nodule R91.1    Cholelithiasis K80.20    Acute cholecystitis K81.0    Thrombocytopenia (HCC) D69.6    Morbid obesity with BMI of 40.0-44.9, adult ([facility]) E66.01, Z68.41    COPD (chronic obstructive pulmonary disease) (HCC) J44.9    Blood loss anemia D50.0    GI hemorrhage K92.2       Past Medical History:        Diagnosis Date    Cataracts, bilateral     Cervical cancer screening 2000    Nml per pt'    COPD (chronic obstructive pulmonary disease) ([facility]     Portable oxygen:2 L/min    Gall stones     H/O colonoscopy 1990    polyp    H/O mammogram 2013    Nml per pt'.     Hypertension 2010    Morbid obesity with BMI of 40.0-44.9, adult ([facility] 9/22/2020       Past Surgical History:        Procedure Laterality Date    COLON SURGERY  1990    colon cancer:s/p surgery per pt'    COLONOSCOPY N/A 6/15/2022    COLONOSCOPY WITH BIOPSY performed by Ness Guan MD at [place] [address]  6/15/2022    COLONOSCOPY POLYPECTOMY SNARE/COLD BIOPSY performed by Ness Guan MD at [place] [address]  6/15/2022    COLONOSCOPY SUBMUCOSAL/ INJECTION performed by Ness Guan MD at [address] [facility] ENDOSCOPY N/A 6/15/2022    EGD BIOPSY performed by Ness Guan MD at [address] [facility] History:    Social History     Tobacco Use    Smoking status: Former Smoker     Packs/day: 1.00     Years: 30.00     Pack years: 30.00     Types: Cigarettes     Quit date: 10/14/1995 Years since quittin.6    Smokeless tobacco: Never Used   Substance Use Topics    Alcohol use: No                                Counseling given: Not Answered      Vital Signs (Current): There were no vitals filed for this visit. BP Readings from Last 3 Encounters:   22 128/67   20 (!) 146/68   10/02/20 (!) 154/70       NPO Status:                                                                                 BMI:   Wt Readings from Last 3 Encounters:   22 177 lb 3.2 oz (80.4 kg)   20 184 lb (83.5 kg)   10/02/20 180 lb (81.6 kg)     There is no height or weight on file to calculate BMI.    CBC:   Lab Results   Component Value Date    WBC 17.5 06/15/2022    RBC 3.67 06/15/2022    HGB 8.7 06/15/2022    HCT 28.6 06/15/2022    MCV 78.1 06/15/2022    RDW 19.1 06/15/2022     06/15/2022       CMP:   Lab Results   Component Value Date     06/15/2022    K 4.4 06/15/2022    K 3.7 2022    CL 96 06/15/2022    CO2 37 06/15/2022    BUN 34 06/15/2022    CREATININE 1.4 06/15/2022    GFRAA 43 06/15/2022    GFRAA >60 2013    AGRATIO 1.8 2022    LABGLOM 36 06/15/2022    GLUCOSE 98 06/15/2022    PROT 6.5 06/15/2022    PROT 6.8 2013    CALCIUM 9.6 06/15/2022    BILITOT 0.8 06/15/2022    ALKPHOS 83 06/15/2022    AST 45 06/15/2022    ALT 36 06/15/2022       POC Tests: No results for input(s): POCGLU, POCNA, POCK, POCCL, POCBUN, POCHEMO, POCHCT in the last 72 hours.     Coags:   Lab Results   Component Value Date    PROTIME 13.3 2020    INR 1.14 2020    APTT 26.2 2020       HCG (If Applicable): No results found for: PREGTESTUR, PREGSERUM, HCG, HCGQUANT     ABGs:   Lab Results   Component Value Date    PHART 7.393 2022    PO2ART 69.2 2022    QWY0MBW 50.0 2022    BBD3EWD 30.4 2022    BEART 5.0 2022    M7HDIUFG 94.7 2022        Type & Screen (If Applicable):  No results found for: LABABO, LABRH    Drug/Infectious Status (If Applicable):  No results found for: HIV, HEPCAB    COVID-19 Screening (If Applicable):   Lab Results   Component Value Date    COVID19 NOT DETECTED 06/11/2022           Anesthesia Evaluation  Patient summary reviewed and Nursing notes reviewed no history of anesthetic complications:   Airway: Mallampati: II  TM distance: >3 FB   Neck ROM: full  Mouth opening: > = 3 FB   Dental:    (+) upper dentures and lower dentures      Pulmonary:   (+) COPD:      (-) asthma and shortness of breath                           Cardiovascular:    (+) hypertension:,     (-)  angina                Neuro/Psych:      (-) CVA           GI/Hepatic/Renal:        (-) GERD and liver disease       Endo/Other:    (+) blood dyscrasia: anemia:., .    (-) diabetes mellitus, hypothyroidism               Abdominal:             Vascular:     - PVD. Other Findings:             Anesthesia Plan      general     ASA 4     (COPD with exacerbation, anemia, on 6 L O2; duoneb)  Induction: intravenous. Plan discussed with attending.                     Chevy CAMACHO, SHAN - CRNA   6/16/2022

## 2022-06-16 NOTE — PROGRESS NOTES
Gastroenterology Progress Note      Ankita Newman is a 79 y.o. female patient. 1. Blood loss anemia    2. Hypoxia    3. COPD exacerbation ([address]    4. Anemia, unspecified type        SUBJECTIVE:   No abdominal pain. N/V.     ROS:  Cardiovascular ROS: no chest pain or dyspnea on exertion  Gastrointestinal ROS: see hpi  Respiratory ROS: no cough, SOB    Physical    VITALS:  /67   Pulse 81   Temp 98.3 °F (36.8 °C) (Oral)   Resp 16   Ht 5' 2\" (1.575 m)   Wt 177 lb 3.2 oz (80.4 kg)   SpO2 90%   BMI 32.41 kg/m²   TEMPERATURE:  Current - Temp: 98.3 °F (36.8 °C); Max - Temp  Av.8 °F (36.6 °C)  Min: 96.8 °F (36 °C)  Max: 98.6 °F (37 °C)    NAD  Abdomen soft, ND, NT, no HSM, Bowel sounds normal  AAOx3      Data    Data Review:    Recent Labs     22  0952 06/15/22  1033 22  0648   WBC 12.2*  --   --  17.5* 7.8   HGB 8.4*   < > 9.1* 8.7* 7.5*   HCT 26.6*   < > 29.2* 28.6* 24.4*   MCV 75.8*  --   --  78.1* 79.2*   PLT 85*  --   --  143 108*    < > = values in this interval not displayed. Recent Labs     06/14/22  0220 06/15/22  1033 22  0648    141 141   K 3.5 4.4 4.6    96* 99   CO2 32 37* 36*   BUN 27* 34* 31*   CREATININE 1.3* 1.4* 1.2     Recent Labs     06/15/22  1033   AST 45*   ALT 36   BILIDIR <0.2   BILITOT 0.8   ALKPHOS 83     No results for input(s): LIPASE, AMYLASE in the last 72 hours. No results for input(s): PROTIME, INR in the last 72 hours. No results for input(s): PTT in the last 72 hours. CT abd/pelvis w iv contrast 6/15/22  Impression   1. Mildly distended loops of small bowel in the lower abdomen without   discrete transition point, which could be due to partial obstruction or   ileus.  Clinical correlation is recommended. 2. Prior right hemicolectomy.  The remaining colon is nondilated with   scattered noninflamed diverticula.    3. Consolidation and ground-glass in the lung bases with small bilateral pleural effusions, concerning for pneumonia. 4. Ascites. Findings:             1) Small amount of adherent hematin scattered over gastric mucosa  2) Otherwise normal Esophagogastroduodenoscopy - duodenal biopsies obtained. 3) 2 - 3 cm ulcerated mass just distal to ileocolonic anastomosis - biopsied. Colon mass site marked with bao ink - total volume 2.5 mL injected at 4 sites around circumference of colon at and just distal to mass. 4) 4 mm polyp of transverse colon - D12.3 removed with cold snare  5) Four 4 mm polyps of sigmoid colon - D12.5 removed with cold snare       ASSESSMENT     Iron deficiency anemia - no gross GI bleeding. S/p EGD and colonoscopy 6/15. colonoscopy with 2-3 cm ulcerated mass just distal to ileocolic anastomosis. Path pending. CT abd/pelvis did not show metastatic disease.  hgb from 8.7-> 7.5 but no gross GI bleeding. H/o CRC  COPD - currently on baseline 4 L O2. PLAN    - f/u path  - appreciate surgery eval for right colon resection     Discussed with Dr. Carlo Perez, PA-[address]  I have personally performed a face to face diagnostic evaluation on this patient. I have interviewed and examined the patient and I agree with the findings and recommended plan of care. In summary, my findings and plan are the following: As above, she states feeling much better since colonoscopy. No c/o today. She has may questions regarding surgery including whether she has an umbilical surgery or whether her gallbladder should be removed at time of colon resection. I deferred to Surgery team but stated no need if not symptomatic. Her CEA returned mildly elevated at 7.2. She should have a repeat colonoscopy 1 yr after her resection.     Mami Fontaine, [address] [place] [address]  6/16/2022

## 2022-06-16 NOTE — PROGRESS NOTES
[facility]    6/16/2022 12:36 PM        Name: Ginny Moses . Admitted: 6/11/2022  Primary Care Provider: Melissa Holt MD (Tel: [phone])      Chief Complaint   Patient presents with    Shortness of Breath     Using nebulizer without relief; increasing SOB; just finished abx from urgent care; brought in by [facility]. Brief History: Patient is an 78 yo female with hx COPD, chronic hypoxic respiratory failure on 3-4 liters continuously, HTN. She presented to ER with increasing shortness of breath and fatigue which have been progressively worsening over past several weeks. Found to have severe anemia with Hgb 6.2, stool guaiac positive. COVID and influenza screen negative. Procedure(s) 6/15/2022:  EGD BIOPSY  COLONOSCOPY WITH BIOPSY  COLONOSCOPY POLYPECTOMY SNARE/COLD BIOPSY  COLONOSCOPY SUBMUCOSAL/ INJECTION    Subjective: Resting in bed, visitor at bedside. EGD yesterday revealed colon mass, surgery has been consulted. She offers c/o some shortness of breath today, says it was particularly bad overnight. Weight noted to be up ~ 20 lbs compared to admission (if accurate). CT abd/pelvis yesterday with ground glass in lung bases with small bilateral pleural effusions. Few basilar crackles on exam, wheezes throughout.       Reviewed interval ancillary notes    Current Medications  0.9 % sodium chloride infusion, Continuous  0.9 % sodium chloride infusion, PRN  pantoprazole (PROTONIX) tablet 40 mg, BID AC  budesonide (PULMICORT) nebulizer suspension 500 mcg, BID  Arformoterol Tartrate (BROVANA) nebulizer solution 15 mcg, BID  methylPREDNISolone sodium (SOLU-MEDROL) injection 40 mg, Q8H  albuterol (PROVENTIL) nebulizer solution 2.5 mg, Q4H PRN  ipratropium-albuterol (DUONEB) nebulizer solution 1 ampule, Q4H  0.9 % sodium chloride infusion, PRN  sodium chloride flush 0.9 % injection 5-40 mL, 2 times per day  sodium chloride flush 0.9 % injection 5-40 mL, PRN  0.9 % sodium chloride infusion, PRN  ondansetron (ZOFRAN-ODT) disintegrating tablet 4 mg, Q8H PRN   Or  ondansetron (ZOFRAN) injection 4 mg, Q6H PRN  polyethylene glycol (GLYCOLAX) packet 17 g, Daily PRN  acetaminophen (TYLENOL) tablet 650 mg, Q6H PRN   Or  acetaminophen (TYLENOL) suppository 650 mg, Q6H PRN  amLODIPine (NORVASC) tablet 10 mg, Nightly  metoprolol tartrate (LOPRESSOR) tablet 50 mg, BID  labetalol (NORMODYNE;TRANDATE) injection 5 mg, Q4H PRN      Objective:  BP (!) 136/55 Comment: RN notified  Pulse 79   Temp 98.4 °F (36.9 °C) (Axillary)   Resp 16   Ht 5' 2\" (1.575 m)   Wt 177 lb 3.2 oz (80.4 kg)   SpO2 92%   BMI 32.41 kg/m²     Intake/Output Summary (Last 24 hours) at 6/16/2022 1236  Last data filed at 6/16/2022 1028  Gross per 24 hour   Intake 1780 ml   Output --   Net 1780 ml      Wt Readings from Last 3 Encounters:   06/12/22 177 lb 3.2 oz (80.4 kg)   11/21/20 184 lb (83.5 kg)   10/02/20 180 lb (81.6 kg)     General:  Awake, alert, oriented in NAD  Skin:  Warm and dry. Neck:  Supple. No JVD appreciated  Chest:  Normal effort. Diminished, bibasilar crackles, expiratory wheezes throughout, O2 sats low 90s on 7 liters  Cardiovascular:  RRR, normal S1/S2, no murmur/gallop/rub  Abdomen:  Soft, nontender, +bowel sounds  Extremities:  No edema  Neurological: No focal deficits  Psychological: Normal mood and affect      Labs and Tests:  CBC:   Recent Labs     06/14/22  0220 06/14/22  0220 06/14/22  0952 06/15/22  1033 06/16/22  0648   WBC 12.2*  --   --  17.5* 7.8   HGB 8.4*   < > 9.1* 8.7* 7.5*   PLT 85*  --   --  143 108*    < > = values in this interval not displayed.      BMP:    Recent Labs     06/14/22  0220 06/15/22  1033 06/16/22  0648    141 141   K 3.5 4.4 4.6    96* 99   CO2 32 37* 36*   BUN 27* 34* 31*   CREATININE 1.3* 1.4* 1.2   GLUCOSE 146* 98 138*     Hepatic:   Recent Labs     06/15/22  1033   AST 45* ALT 36   BILITOT 0.8   ALKPHOS 83       CXR 6/11/2022:  Chronic findings in the chest without acute airspace disease identified. CXR 6/12/2022:  Chronic findings in the chest.  Similar appearance of vascular prominence   without overt edema.         Renal Ultrasound 6/13/2021:  Unremarkable ultrasound of the kidneys and urinary bladder. EGD / Colonoscopy 6/15/2022:  Findings:             1) Small amount of adherent hematin scattered over gastric mucosa  2) Otherwise normal Esophagogastroduodenoscopy - duodenal biopsies obtained. 3) 2 - 3 cm ulcerated mass just distal to ileocolonic anastomosis - biopsied. Colon mass site marked with bao ink - total volume 2.5 mL injected at 4 sites around circumference of colon at and just distal to mass. 4) 4 mm polyp of transverse colon - D12.3 removed with cold snare  5) Four 4 mm polyps of sigmoid colon - D12.5 removed with cold snare    CT Abdomen / Pelvis 6/15/2022:  1. Mildly distended loops of small bowel in the lower abdomen without   discrete transition point, which could be due to partial obstruction or   ileus.  Clinical correlation is recommended. 2. Prior right hemicolectomy.  The remaining colon is nondilated with   scattered noninflamed diverticula. 3. Consolidation and ground-glass in the lung bases with small bilateral   pleural effusions, concerning for pneumonia. 4. Ascites. Problem List  Principal Problem:    GI hemorrhage  Active Problems:    COPD exacerbation (HCC)    Blood loss anemia  Resolved Problems:    * No resolved hospital problems. *       Assessment & Plan:   1. Acute blood loss anemia. Hgb 6.2 on presentation and she received 1 unit PRBCs 6/11 and again 6/13. Stool occult positive. Received IV Venofer x 3. EGD/colonoscopy (6/15) with 2-3 cm ulcerated mass just distal to ileocolic anastomosis, biopsy results pending.  Hgb down today 8.7->7.5 with no gross bleeding noted, ? secondary to volume overload (all indices are lower). CBC in am.    2. Acute COPD exacerbation. Respiratory panel negative, llu and COVID swabs negative. Remains on IV solumedrol q 8 hours, still with wheezes throughout and increased O2 needs. Continue Brovana, Pulmicort, and duonebs. Pulmonary on board. 3. Acute on chronic hypoxic respiratory failure based on increased O2 requirements and increased work of breathing. Secondary to COPD exacerbation. Treatment as above. 4. Abnormal CT scan showing consolidation and ground glass in lung bases with small bilateral pleural effusions. Remains afebrile. No hx of CHF but she has received IV fluids and transfusions. Her weight is up compared to admission. Will stop IV fluids and give Lasix 20 mg IV x 1, nephrology in agreement. Add on BNP and procalcitonin level. Check echo. 5. HTN. Controlled. HCTZ held on admission considering JUN. Continue Norvasc and metoprolol. 6. JUN. Creatinine 1.4 on admission, baseline 0.9. Received gentle hydration, HCTZ on hold. Creatinine 1.2 today and has been stable. Nephrology on board. Disposition: PT/OT evaluated and recommend SNF on DC to address ADL and functional mobility deficits but patient declines consideration. She is agreeable to home care and consult placed 6/15        Diet: ADULT DIET;  Regular  Diet NPO  Code:Full Code  DVT PPX: SHAN Peña - CNP   6/16/2022 12:36 PM

## 2022-06-16 NOTE — PLAN OF CARE
Problem: Safety - Adult  Goal: Free from fall injury  6/16/2022 0943 by Marianela Garza RN  Outcome: Progressing  Flowsheets (Taken 6/16/2022 0942)  Free From Fall Injury: Instruct family/caregiver on patient safety  6/16/2022 0247 by Bertha Eugene RN  Outcome: Progressing  [address] (Taken 6/16/2022 0244)  Free From Fall Injury:   Instruct family/caregiver on patient safety   Based on caregiver fall risk screen, instruct family/caregiver to ask for assistance with transferring infant if caregiver noted to have fall risk factors  6/15/2022 1949 by Jennine Leventhal, RN  Outcome: Progressing     Problem: ABCDS Injury Assessment  Goal: Absence of physical injury  6/16/2022 0943 by Marianela Garza RN  Outcome: Progressing  Flowsheets (Taken 6/16/2022 0942)  Absence of Physical Injury: Implement safety measures based on patient assessment  6/16/2022 0247 by Bertha Eugene RN  Outcome: Progressing  Flowsheets (Taken 6/16/2022 0244)  Absence of Physical Injury: Implement safety measures based on patient assessment  6/15/2022 1949 by Jennine Leventhal, RN  Outcome: Progressing     Problem: Pain  Goal: Verbalizes/displays adequate comfort level or baseline comfort level  6/16/2022 0943 by Marianela Garza RN  Outcome: Progressing  Flowsheets (Taken 6/16/2022 0715)  Verbalizes/displays adequate comfort level or baseline comfort level: Encourage patient to monitor pain and request assistance  6/16/2022 0247 by Bertha Eugene RN  Outcome: Progressing

## 2022-06-16 NOTE — PROGRESS NOTES
MD Margarito Aragon MD Ronalee Han, MD                                  Office: [phone]                 Fax: [phone]          [employer]                     NEPHROLOGY IN PATIENT PROGRESS NOTE:     PATIENT NAME: Ankita Newman  : 1940  MRN: [id number]            Subjective:     Mild worsening shortness of breath. Trace edema. Good urine output. No hypotension. Assessment and Plan    Assessment:   Acute kidney injury-mild to moderate- remained stable  Fluid overload-discontinue IV fluids  Most likely has chronic kidney disease. Stage IIIa. - Hypertensive kidney disease. Severe anemia-possible anemia of GI bleed. History of uncontrolled hypertension. COPD.              Plan:       Acute kidney injury-mild to moderate-creatinine is 1.4 on admission.  - Etiology likely multifactorial.  - Most likely prerenal due to volume depletion.  - Get ultrasound scan to exclude any urinary retention/hydronephrosis. Less likely due to ATN. Creatinine improved to 1.2-with improved urine output. Mild fluid overload. - Discontinued IV fluids.  - Agree with Lasix 20 mg.  - 1.2 L fluid restriction.  - Occasions reviewed. - Electrolytes are stable. - Blood pressure control much improved. - ACE inhibitor on hold. - Anemia monitoring-repeat hemoglobin 7.5-GI following         Renal ultrasound scan that I have measured showed significant small size kidneys consistent with hypertensive kidney disease.  - No hydronephrosis. No distended bladder. Hypertension. Appears well controlled. - Monitor for hypotension from hypovolemia.  - Hold ACE inhibitor till renal function stabilized. Continue current medications.     Anemia-severe-hemoglobin 7.4 on admission. GI evaluating.  - Rule out possible anemia of GI bleed. .  - Check iron studies.  - May require blood transfusion if any further worsening       Current medications reviewed and appropriate.   - Discussed with patient. EXAM  Vitals:    06/16/22 1115   BP: (!) 136/55   Pulse: 79   Resp: 16   Temp: 98.4 °F (36.9 °C)   SpO2: (!) 85%       Intake/Output Summary (Last 24 hours) at 6/16/2022 1210  Last data filed at 6/16/2022 1028  Gross per 24 hour   Intake 1780 ml   Output --   Net 1780 ml       ill appearing. mild respiratory distress. Awake alert   no hypotension     Edemaimproved. More awake and alert. All pulses are well felt      Principal Problem:    GI hemorrhage  Active Problems:    COPD exacerbation (HCC)    Blood loss anemia  Resolved Problems:    * No resolved hospital problems. *        Medications Reviewed by me   pantoprazole  40 mg Oral BID AC    budesonide  0.5 mg Nebulization BID    Arformoterol Tartrate  15 mcg Nebulization BID    methylPREDNISolone  40 mg IntraVENous Q8H    ipratropium-albuterol  1 ampule Inhalation Q4H    sodium chloride flush  5-40 mL IntraVENous 2 times per day    amLODIPine  10 mg Oral Nightly    metoprolol tartrate  50 mg Oral BID      sodium chloride 75 mL/hr at 06/15/22 1631    sodium chloride      sodium chloride      sodium chloride 100 mL/hr at 06/15/22 1218       Data Review. Labs reviewed by me       CBC:   Recent Labs     06/14/22 0220 06/14/22 0220 06/14/22 0952 06/15/22  1033 06/16/22  0648   WBC 12.2*  --   --  17.5* 7.8   HGB 8.4*   < > 9.1* 8.7* 7.5*   HCT 26.6*   < > 29.2* 28.6* 24.4*   MCV 75.8*  --   --  78.1* 79.2*   PLT 85*  --   --  143 108*    < > = values in this interval not displayed.      BMP:   Recent Labs     06/14/22  0220 06/15/22  1033 06/16/22  0648    141 141   K 3.5 4.4 4.6    96* 99   CO2 32 37* 36*   BUN 27* 34* 31*   CREATININE 1.3* 1.4* 1.2     Magnesium:   Lab Results   Component Value Date    MG 1.70 09/20/2020    MG 1.90 09/19/2020    MG 2.00 06/30/2015     Lab Results   Component Value Date    CREATININE 1.2 06/16/2022       Arterial Blood Gasses  No results for input(s): PH, PCO2, PO2 in the last 72 hours. Invalid input(s): Rayshawn Millin    UA:  No results for input(s): NITRITE, COLORU, PHUR, LABCAST, WBCUA, RBCUA, MUCUS, TRICHOMONAS, YEAST, BACTERIA, CLARITYU, SPECGRAV, LEUKOCYTESUR, UROBILINOGEN, BILIRUBINUR, BLOODU, GLUCOSEU, AMORPHOUS in the last 72 hours. Invalid input(s): Masha Ruiz    LIVER PROFILE:   Recent Labs     06/15/22  1033   AST 45*   ALT 36   BILIDIR <0.2   BILITOT 0.8   ALKPHOS 83     PT/INR:    Lab Results   Component Value Date    PROTIME 13.3 09/19/2020    PROTIME 11.4 06/28/2015    INR 1.14 09/19/2020    INR 1.05 06/28/2015     PTT:    Lab Results   Component Value Date    APTT 26.2 09/19/2020     COCO:    Lab Results   Component Value Date    COCO Negative 09/20/2020     CHEMISTRY COMMON GROUP :   Lab Results   Component Value Date    GLUCOSE 138 06/16/2022     Recent Labs     06/14/22  0220 06/15/22  1033 06/16/22  0648   GLUCOSE 146* 98 138*   CALCIUM 9.6 9.6 8.9         RADIOLOGY:        Imaging Results. Chest X Ray reviwed by me    Chest Xray Reviewed by me  Renal Ultrasound Reviewed by me    EKG reviewed by me.                 Electronically Signed: Lloyd Emerson MD 6/16/2022 12:10 PM

## 2022-06-16 NOTE — PLAN OF CARE
[facility]        Assessment & Plan of Care    History of Present Illness: Ms. [facility] [place] is a 79 y.o. female who presented to the ED 5 days ago with a chief complaint of SOB. She was found to be anemic (hemoglobin 6.2) and was guaiac positive. Denies abdominal pain, nausea, vomiting, hematochezia, or melena. After medical stabilization of COPD exacerbation (home oxygen at 3-4 L/min) she underwent EGD/colonoscopy yesterday which revealed a 2-3 cm ulcerated mass just distal to ileocolonic anastomosis for which we were asked to evaluate for surgical resection. Past surgical history is significant for a right colon resection in the late 1980's/early 1990's for colon cancer. She reports hernia development at her incision with no prior repair. Other medical history includes hypertension. No blood thinners. Former smoker. Physical Exam:  CONSTITUTIONAL:  alert, no apparent distress and mildly obese  NEUROLOGIC:  Mental Status Exam:  Level of Alertness:   awake  Orientation:   person, place, time  EYES:  sclera clear  ENT:  normocepalic, without obvious abnormality  NECK:  supple, symmetrical, trachea midline  LUNGS:  clear to auscultation  CARDIOVASCULAR:  regular rate and rhythm and no murmur noted  ABDOMEN: soft, non-distended, non-tender, voluntary guarding absent, no masses palpated, normal bowel sounds,  scars noted right lower quadrant, and hernia present--small umbilical  Extremities: no edema  SKIN:  no bruising or bleeding and normal skin color, texture, turgor    Assessment:  Colon mass, just distal to ileocolonic anastomosis  History of colon cancer with prior right colon resection  Acute blood loss anemia  COPD exacerbation  Hypertension    Plan:  1. Timing of possible right colon resection to be determined; pending biopsy results and evaluation/risk stratification per pulmonology for OR  2. Regular diet as tolerated; monitor bowel function  3.  IV hydration; monitor and correct electrolytes  4. Activity as tolerated--PT/OT following  5. Management of medical comorbid etiologies per primary team and consulting services    EDUCATION:  Educated patient on plan of care and disease process--all questions answered. Plans discussed with patient and nursing. Reviewed and discussed with Dr. Leticia Cassidy consult to follow.       Signed:  SHAN Arellano - CNP  6/16/2022 8:28 AM    Patient seen and examined  Full note to follow  58-year-old female with a colonic mass near a previous ileocolonic anastomosis  CT shows no evidence of metastatic disease  The patient has severe COPD and is currently admitted for COPD exacerbation  Will need segmental colon resection once medically optimized  Will discuss case with pulmonology

## 2022-06-16 NOTE — PLAN OF CARE
Problem: Safety - Adult  Goal: Free from fall injury  6/16/2022 0247 by Laurie Diego RN  Outcome: Progressing  Flowsheets (Taken 6/16/2022 0244)  Free From Fall Injury:   [place]et[place] family/caregiver on patient safety   Based on caregiver fall risk screen, instruct family/caregiver to ask for assistance with transferring infant if caregiver noted to have fall risk factors  6/15/2022 1949 by Alber Gates RN  Outcome: Progressing     Problem: ABCDS Injury Assessment  Goal: Absence of physical injury  6/16/2022 0247 by Laurie Diego RN  Outcome: Progressing  Flowsheets (Taken 6/16/2022 0244)  Absence of Physical Injury: Implement safety measures based on patient assessment  6/15/2022 1949 by Alber Gates RN  Outcome: Progressing

## 2022-06-17 NOTE — PLAN OF CARE
Problem: Safety - Adult  Goal: Free from fall injury  Outcome: Progressing     Problem: ABCDS Injury Assessment  Goal: Absence of physical injury  Outcome: Progressing     Problem: Pain  Goal: Verbalizes/displays adequate comfort level or baseline comfort level  Outcome: Progressing    Problem: Skin/Tissue Integrity  Goal: Absence of new skin breakdown  Description: 1. Monitor for areas of redness and/or skin breakdown  2. Assess vascular access sites hourly  3. Every 4-6 hours minimum:  Change oxygen saturation probe site  4. Every 4-6 hours:  If on nasal continuous positive airway pressure, respiratory therapy assess nares and determine need for appliance change or resting period.   Outcome: Progressing

## 2022-06-17 NOTE — PLAN OF CARE
Problem: Safety - Adult  Goal: Free from fall injury  6/17/2022 0938 by Minerva Hancock RN  Outcome: Progressing  6/17/2022 0144 by Mikey Riojas RN  Outcome: Progressing     Problem: ABCDS Injury Assessment  Goal: Absence of physical injury  6/17/2022 0938 by Minerva Hancock RN  Outcome: Progressing  6/17/2022 0144 by Mikey Riojas RN  Outcome: Progressing     Problem: Pain  Goal: Verbalizes/displays adequate comfort level or baseline comfort level  6/17/2022 0938 by Minerva Hancock RN  Outcome: Progressing  6/17/2022 0144 by Mikey Riojas RN  Outcome: Progressing  Flowsheets (Taken 6/16/2022 1645 by Minerva Hancock RN)  Verbalizes/displays adequate comfort level or baseline comfort level: Encourage patient to monitor pain and request assistance     Problem: Skin/Tissue Integrity  Goal: Absence of new skin breakdown  Description: 1. Monitor for areas of redness and/or skin breakdown  2. Assess vascular access sites hourly  3. Every 4-6 hours minimum:  Change oxygen saturation probe site  4. Every 4-6 hours:  If on nasal continuous positive airway pressure, respiratory therapy assess nares and determine need for appliance change or resting period.   6/17/2022 6454 by Minerva Hancock RN  Outcome: Progressing  6/17/2022 0144 by Mikey Riojas RN  Outcome: Progressing

## 2022-06-17 NOTE — PROGRESS NOTES
Physical Therapy  Stacey Skaggs  Attempted PT session this AM however Pt is with imaging at this time. Will attempt to follow up as schedule allows.    Tana Beyer, [address] [facility], DPT [id number]

## 2022-06-17 NOTE — PROGRESS NOTES
MD Mckay Cisse MD Butler Roller, MD                                  Office: [phone]                 Fax: [phone]          [employer]                     NEPHROLOGY IN PATIENT PROGRESS NOTE:     PATIENT NAME: Mandi Palacios  : 1940  MRN: [id number]            Subjective:     Mild worsening shortness of breath. Trace edema. Good urine output. No hypotension. Assessment and Plan    Assessment:   Acute kidney injury. Improved to baseline. Hypertension. Colon mass. Anemia.           Plan:       Acute kidney injury-at baseline-with creatinine of 1.3. Electrolytes are stable. Volume status stable. Colon mass. Anemia stable. Medications reviewed             EXAM  Vitals:    22 1535   BP:    Pulse: 92   Resp: 18   Temp:    SpO2: 98%       Intake/Output Summary (Last 24 hours) at 2022 1707  Last data filed at 2022 1435  Gross per 24 hour   Intake 365 ml   Output --   Net 365 ml     No hypotension      Principal Problem:    GI hemorrhage  Active Problems:    COPD exacerbation (HCC)    Blood loss anemia  Resolved Problems:    * No resolved hospital problems. *        Medications Reviewed by me   furosemide  20 mg Oral Daily    methylPREDNISolone  40 mg IntraVENous Q6H    guaiFENesin  600 mg Oral BID    pantoprazole  40 mg Oral BID AC    budesonide  0.5 mg Nebulization BID    Arformoterol Tartrate  15 mcg Nebulization BID    ipratropium-albuterol  1 ampule Inhalation Q4H    sodium chloride flush  5-40 mL IntraVENous 2 times per day    amLODIPine  10 mg Oral Nightly    metoprolol tartrate  50 mg Oral BID      sodium chloride      sodium chloride      sodium chloride 100 mL/hr at 06/15/22 1218       Data Review.     Labs reviewed by me       CBC:   Recent Labs     06/15/22  1033 22  0648 22  0647   WBC 17.5* 7.8 10.4   HGB 8.7* 7.5* 7.5*   HCT 28.6* 24.4* 24.8*   MCV 78.1* 79.2* 81.2    108* 126*     BMP: Recent Labs     06/15/22  1033 06/16/22  0648 06/17/22  0647    141 145   K 4.4 4.6 4.6   CL 96* 99 100   CO2 37* 36* 37*   BUN 34* 31* 38*   CREATININE 1.4* 1.2 1.3*     Magnesium:   Lab Results   Component Value Date    MG 1.70 09/20/2020    MG 1.90 09/19/2020    MG 2.00 06/30/2015     Lab Results   Component Value Date    CREATININE 1.3 06/17/2022       Arterial Blood Gasses  No results for input(s): PH, PCO2, PO2 in the last 72 hours. Invalid input(s): Sidra Bergman    UA:  No results for input(s): NITRITE, COLORU, PHUR, LABCAST, WBCUA, RBCUA, MUCUS, TRICHOMONAS, YEAST, BACTERIA, CLARITYU, SPECGRAV, LEUKOCYTESUR, UROBILINOGEN, BILIRUBINUR, BLOODU, GLUCOSEU, AMORPHOUS in the last 72 hours. Invalid input(s): Guillermina Rivers    LIVER PROFILE:   Recent Labs     06/15/22  1033   AST 45*   ALT 36   BILIDIR <0.2   BILITOT 0.8   ALKPHOS 83     PT/INR:    Lab Results   Component Value Date    PROTIME 13.3 09/19/2020    PROTIME 11.4 06/28/2015    INR 1.14 09/19/2020    INR 1.05 06/28/2015     PTT:    Lab Results   Component Value Date    APTT 26.2 09/19/2020     COCO:    Lab Results   Component Value Date    COCO Negative 09/20/2020     CHEMISTRY COMMON GROUP :   Lab Results   Component Value Date    GLUCOSE 150 06/17/2022     Recent Labs     06/15/22  1033 06/16/22  0648 06/17/22  0647   GLUCOSE 98 138* 150*   CALCIUM 9.6 8.9 8.8         RADIOLOGY:        Imaging Results. Chest X Ray reviwed by me    Chest Xray Reviewed by me  Renal Ultrasound Reviewed by me    EKG reviewed by me.                 Electronically Signed: Naina Barajas MD 6/17/2022 5:07 PM

## 2022-06-17 NOTE — PROGRESS NOTES
[facility]    6/17/2022 9:50 AM        Name: Berny Aguirre . Admitted: 6/11/2022  Primary Care Provider: Jinny Alexandra MD (Tel: [phone])      Chief Complaint   Patient presents with    Shortness of Breath     Using nebulizer without relief; increasing SOB; just finished abx from urgent care; brought in by [facility]. Brief History: Patient is an 80 yo female with hx COPD, chronic hypoxic respiratory failure on 3-4 liters continuously, HTN. She presented to ER with increasing shortness of breath and fatigue which have been progressively worsening over past several weeks. Found to have severe anemia with Hgb 6.2, stool guaiac positive. COVID and influenza screen negative. Procedure(s) 6/15/2022:  EGD BIOPSY  COLONOSCOPY WITH BIOPSY  COLONOSCOPY POLYPECTOMY SNARE/COLD BIOPSY  COLONOSCOPY SUBMUCOSAL/ INJECTION    Subjective: Resting in bed, about to work with PT. Family at bedside. Increased O2 requirements overnight. Patient reports cough and difficulty bringing up phlegm, says what she brings up is \"a little blood tinged. \"     Reviewed interval ancillary notes    Current Medications  calcium carbonate (TUMS) chewable tablet 500 mg, TID PRN  guaiFENesin (MUCINEX) extended release tablet 600 mg, BID  perflutren lipid microspheres (DEFINITY) injection 1.65 mg, ONCE PRN  0.9 % sodium chloride infusion, PRN  pantoprazole (PROTONIX) tablet 40 mg, BID AC  budesonide (PULMICORT) nebulizer suspension 500 mcg, BID  Arformoterol Tartrate (BROVANA) nebulizer solution 15 mcg, BID  methylPREDNISolone sodium (SOLU-MEDROL) injection 40 mg, Q8H  albuterol (PROVENTIL) nebulizer solution 2.5 mg, Q4H PRN  ipratropium-albuterol (DUONEB) nebulizer solution 1 ampule, Q4H  0.9 % sodium chloride infusion, PRN  sodium chloride flush 0.9 % injection 5-40 mL, 2 times per day  sodium chloride flush 0.9 % injection 5-40 mL, PRN  0.9 % sodium chloride infusion, PRN  ondansetron (ZOFRAN-ODT) disintegrating tablet 4 mg, Q8H PRN   Or  ondansetron (ZOFRAN) injection 4 mg, Q6H PRN  polyethylene glycol (GLYCOLAX) packet 17 g, Daily PRN  acetaminophen (TYLENOL) tablet 650 mg, Q6H PRN   Or  acetaminophen (TYLENOL) suppository 650 mg, Q6H PRN  amLODIPine (NORVASC) tablet 10 mg, Nightly  metoprolol tartrate (LOPRESSOR) tablet 50 mg, BID  labetalol (NORMODYNE;TRANDATE) injection 5 mg, Q4H PRN      Objective:  BP (!) 149/64   Pulse 92   Temp 98.3 °F (36.8 °C) (Oral)   Resp 22   Ht 5' 2\" (1.575 m)   Wt 194 lb 1.6 oz (88 kg)   SpO2 95%   BMI 35.50 kg/m²     Intake/Output Summary (Last 24 hours) at 6/17/2022 0950  Last data filed at 6/16/2022 1425  Gross per 24 hour   Intake 480 ml   Output --   Net 480 ml      Wt Readings from Last 3 Encounters:   06/17/22 194 lb 1.6 oz (88 kg)   11/21/20 184 lb (83.5 kg)   10/02/20 180 lb (81.6 kg)     General:  Awake, alert, oriented in NAD  Skin:  Warm and dry. No unusual bruising or rash  Neck:  Supple. No JVD appreciated  Chest:  Normal effort. Diminished, wheezes throughout, O2 sats mid 90s on 13 liters  Cardiovascular:  RRR, normal S1/S2, no murmur/gallop/rub  Abdomen:  Soft, nontender, +bowel sounds  Extremities:  No edema  Neurological: No focal deficits  Psychological: Normal mood and affect      Labs and Tests:  CBC:   Recent Labs     06/15/22  1033 06/16/22  0648 06/17/22  0647   WBC 17.5* 7.8 10.4   HGB 8.7* 7.5* 7.5*    108* 126*     BMP:    Recent Labs     06/15/22  1033 06/16/22  0648 06/17/22  0647    141 145   K 4.4 4.6 4.6   CL 96* 99 100   CO2 37* 36* 37*   BUN 34* 31* 38*   CREATININE 1.4* 1.2 1.3*   GLUCOSE 98 138* 150*     Hepatic:   Recent Labs     06/15/22  1033   AST 45*   ALT 36   BILITOT 0.8   ALKPHOS 83       CXR 6/11/2022:  Chronic findings in the chest without acute airspace disease identified.     CXR 6/12/2022:  Chronic findings in the chest.  Similar appearance of vascular prominence   without overt edema.         Renal Ultrasound 6/13/2021:  Unremarkable ultrasound of the kidneys and urinary bladder. EGD / Colonoscopy 6/15/2022:  Findings:             1) Small amount of adherent hematin scattered over gastric mucosa  2) Otherwise normal Esophagogastroduodenoscopy - duodenal biopsies obtained. 3) 2 - 3 cm ulcerated mass just distal to ileocolonic anastomosis - biopsied. Colon mass site marked with bao ink - total volume 2.5 mL injected at 4 sites around circumference of colon at and just distal to mass. 4) 4 mm polyp of transverse colon - D12.3 removed with cold snare  5) Four 4 mm polyps of sigmoid colon - D12.5 removed with cold snare    CT Abdomen / Pelvis 6/15/2022:  1. Mildly distended loops of small bowel in the lower abdomen without   discrete transition point, which could be due to partial obstruction or   ileus.  Clinical correlation is recommended. 2. Prior right hemicolectomy.  The remaining colon is nondilated with   scattered noninflamed diverticula. 3. Consolidation and ground-glass in the lung bases with small bilateral   pleural effusions, concerning for pneumonia. 4. Ascites. Problem List  Principal Problem:    GI hemorrhage  Active Problems:    COPD exacerbation (HCC)    Blood loss anemia  Resolved Problems:    * No resolved hospital problems. *       Assessment & Plan:   1. Acute blood loss anemia. Hgb 6.2 on presentation and she received 1 unit PRBCs 6/11 and again 6/13. Stool occult positive. Received IV Venofer x 3. EGD/colonoscopy (6/15) with 2-3 cm ulcerated mass just distal to ileocolic anastomosis, biopsy shows invasive adenocarcinoma. Hgb stable at 7.5, no gross bleeding noted. 2. Colon mass. Hx of colon cancer 1990s with resection. EGD/colonoscopy (6/15) with 2-3 cm ulcerated mass just distal to ileocolic anastomosis, biopsy shows invasive adenocarcinoma.  Surgery on board, considering lap resection once respiratory status improves. 3. Acute COPD exacerbation. Respiratory panel negative, llu and COVID swabs negative. Remains on IV solumedrol q 8 hours, still with wheezes throughout and increased O2 needs, now up to 13 liters. Continue Brovana, Pulmicort, and q 4 hour duonebs. Pulmonary on board. 4. Acute on chronic hypoxic respiratory failure based on increased O2 requirements and increased work of breathing. Secondary to COPD exacerbation. Treatment as above. 5. Abnormal CT scan showing consolidation and ground glass in lung bases with small bilateral pleural effusions. Remains afebrile. No hx of CHF but she has received IV fluids and transfusions. Her weight is up compared to admission, proBNP 1950 and she received a dose of Lasix yesterday. Weight down 3 lbs overnight, no improvement in O2 needs. Remains afebrile, no leukocytosis, procalcitonin 0.09. Echo pending. Continue Lasix 20 mg po daily. 6. HTN. Controlled. HCTZ held on admission considering JUN. Continue Norvasc and metoprolol. 7. JUN. Creatinine 1.4 on admission, baseline 0.9. Received gentle hydration, HCTZ on hold. Creatinine 1.3 today and has been stable. Nephrology on board. Disposition: PT/OT evaluated and recommend SNF on DC to address ADL and functional mobility deficits but patient declines consideration. She is agreeable to home care and consult placed 6/15        Diet: ADULT DIET;  Regular  Code:Full Code  DVT PPX: SHAN Casillas CNP   6/17/2022 9:50 AM

## 2022-06-17 NOTE — PROGRESS NOTES
Physical [facility] Department   Phone: [phone]    Physical Therapy    [] Initial Evaluation            [x] Daily Treatment Note         [] Discharge Summary      Patient: Tatyana Burgos   : 1940   MRN: [id number]   Date of Service:  2022  Admitting Diagnosis: GI hemorrhage  Current Admission Summary: Susi Bentley is a 79 y. o. female who presents to the emergency department with a chief complaint of shortness of breath that has been getting worse for at least 2 weeks. Jack Olivo was initially seen by urgent care and finished antibiotics multiple days ago. Celine Lav history of COPD chronically on 2 L nasal cannula oxygen.  On 2 L here she is satting at 89%.  Currently on 4 L in the mid to high 90s.  She states she occasionally has a cough that is nonproductive.  Denies chest pain, nausea, vomiting, fevers, abdominal pain, urinary or bowel symptoms.  Denies leg swelling.  Feels like she has been wheezing more at home.  Has not been vaccinated for COVID or flu. Past Medical History:  has a past medical history of Cataracts, bilateral, Cervical cancer screening, COPD (chronic obstructive pulmonary disease) ([facility] [address] Gall stones, H/O colonoscopy, H/O mammogram, Hypertension, and Morbid obesity with BMI of 40.0-44.9, adult ([place] [address] Past Surgical History:  has a past surgical history that includes Colon surgery (). Discharge Recommendations: Tatyana Burgos scored a 18/24 on the AM-PAC short mobility form. Current research shows that an AM-PAC score of 17 or less is typically not associated with a discharge to the patient's home setting.  Based on the patient's AM-PAC score and their current functional mobility deficits, it is recommended that the patient have 3-5 sessions per week of Physical Therapy at d/c to increase the patient's independence.  Please see assessment section for further patient specific details.     If patient discharges prior to next session this note will serve as a discharge summary. Please see below for the latest assessment towards goals.      DME Required For Discharge: rollator (4 wheel walker)  Precautions/Restrictions: high fall risk  Weight Bearing Restrictions: no restrictions  []? Right Upper Extremity     []? Left Upper Extremity     []? Right Lower Extremity       []? Left Lower Extremity         Required Braces/Orthotics: no braces required             []? Right          []? Left  Positional Restrictions:no positional restrictions     Pre-Admission Information   Lives With: daughter, grandchildren                    Type of Home: house  Home Layout: two level, . Comment: lives on first floor  Home Access: 3 steps step to enter without rails   Bathroom Layout: tub/shower unit  Toilet Height: standard height  Bathroom Equipment: shower chair, . Comment: does not use shower chair  Home Equipment: no prior equipment  Transfer Assistance: Independent without use of device  Ambulation Assistance:Independent without use of device  ADL Assistance: independent with all ADL's  IADL Assistance: requires assistance with yard work, requires assistance with driving/transportation, requires assistance with shopping  Active :        []? Yes            [x]? No  Hand Dominance: []? Left            []? Right  Current Employment: retired. Occupation:   Hobbies:   Recent Falls: none     Daughter gets groceries  She mentioned that she does not leave the house very often. Pt has moments of fear of being home alone due to her SOB and medical co-morbidities.           Subjective  General: Pt supine in bed upon arrival. Pt agreeable to PT treatment   Pain: 0/10  Pain Interventions: not applicable       Functional Mobility  Bed Mobility  Supine to Sit: Independent  Scooting: stand by assistance  Comments:   Transfers  Sit to stand transfer: contact guard assistance  Stand to sit transfer: contact guard assistance  Comments: Transfers from EOB, cues for safe hand placement and rollator safety   Ambulation  Surface:level surface  Assistive Device: rollator (9GGY)  Other Appliance: supplemental O2  Assistance: contact guard assistance  Distance: 200ft +200ft   Gait Mechanics: decreased step height, decreased step length    Comments:  Pt ambulates 200ft with rollator CGA before seated rest. After ~15 min seated rest, pt additional 200 ft rollator CGA. Stair Mobility  Stair mobility not completed on this date. Comments:  Wheelchair Mobility:  No w/c mobility completed on this date. Comments:  Balance  Static Sitting Balance: good: independent with functional balance in unsupported position  Dynamic Sitting Balance: good: independent with functional balance in unsupported position  Static Standing Balance: fair (-): maintains balance at CGA with use of UE support  Dynamic Standing Balance: fair (-): maintains balance at CGA with use of UE support  Comments: Pt sits in unsupported position at hallway bench ~15 mins SBA. Other Therapeutic Interventions; Seated marches x10, seated LAQ x10, seated ankle pumps x10     Functional Outcomes  AM-PAC Inpatient Mobility Raw Score : 18              Cognition  WFL  Orientation:    alert and oriented x 4  Command Following:   [facility]    Education  Barriers To Learning: none  Patient Education: patient educated on goals, PT role and benefits, plan of care, discharge recommendations  Learning Assessment:  patient verbalizes and demonstrates understanding    Assessment  Activity Tolerance: Pt on 10L O2 upon arrival with SPo2 resting at 92%. Pt placed on 15L for activity with SPo2 >93%. One instance of SPo2 dropping to 79% when O2 not properly donned, requiring ~3 min seated rest and PLB to recover. Attempted to ween pt back to 10L at EOS however left on 13L resting at SPo2 91%, nursing notified. Pt becomes emotionally tearful during session talking about 3 children that have passes, emotional support provided.      Impairments Requiring Therapeutic Intervention: decreased functional mobility, decreased strength, decreased endurance, decreased balance  Prognosis: good  Clinical Assessment: Pt has a history of COPD, and hypoxia with low O2 levels. Pt reports fear of returning home, the desire to improve her endurance, and fear with community ambulation and breathlessness. The pt was previously independent, lives with her daughter and did not use an AD. Pt shows improvement this date with use of rollator, ambulating 200ft x2 with CGA. Considering these factors, PT recommends that the pt receives skilled therapy, and a rollator walker before returning home to improve endurance, independence, and community ambulation. Safety Interventions: patient left in bed, bed alarm in place, call light within reach, gait belt, patient at risk for falls, nurse notified and family/caregiver present Pt left sitting EOB with family in room, bed alarm on, nursing notified     Plan  Frequency: 3-5 x/per week  Current Treatment Recommendations: strengthening, balance training, functional mobility training, transfer training, gait training, stair training and endurance training    Goals   Patient Goals: To improve confidence and reduce anxiety about breathlessness   Short Term Goals:  Time Frame: discharge (NO GOALS MET THIS DATE)   Patient will complete transfers at stand by assistance   Patient will ambulate 200 ft with use of rolling walker at stand by assistance, with minimal reports of SOB  Patient to maintain dynamic standing activity at contact guard assistance for 10 minutes.     Therapy Session Time      Individual Group Co-treatment   Time In 3977       Time Out 1101       Minutes 69         Total Treatment Minutes:  69       Electronically Signed By: Jewell Kiran [address]

## 2022-06-17 NOTE — PROGRESS NOTES
[facility]        Progress Note    Patient Name: Hever Ku  MRN: [id number]  YOB: 1940  Date of Evaluation: 2022    Chief Complaint: Colon mass    Subjective: Increased SOB and oxygen needs overnight  Denies abdominal pain  No nausea or vomiting  Resting in bed at this time      Vital Signs:  Patient Vitals for the past 24 hrs:   BP Temp Temp src Pulse Resp SpO2 Weight   22 1527 -- -- -- 92 18 98 % --   22 1506 (!) 153/73 98.5 °F (36.9 °C) Oral 81 18 93 % --   22 1205 -- -- -- -- -- 95 % --   22 1115 (!) 154/66 -- -- -- -- -- --   22 1110 (!) 164/66 98.7 °F (37.1 °C) Oral 90 18 95 % --   22 0953 (!) 149/64 -- -- 92 -- -- --   22 0908 -- -- -- 92 -- 95 % --   22 0822 -- -- -- 100 22 93 % --   22 0821 -- -- -- 100 22 93 % --   22 0813 -- -- -- 99 22 93 % --   22 0758 -- -- -- 99 22 93 % --   22 0715 (!) 149/64 98.3 °F (36.8 °C) Oral 87 18 90 % --   22 0646 -- -- -- -- -- -- 194 lb 1.6 oz (88 kg)   22 0613 (!) 148/61 97.8 °F (36.6 °C) Oral 90 18 92 % --   22 0432 -- -- -- -- -- 96 % --   22 0035 -- -- -- -- -- 92 % --   22 0015 (!) 144/67 98.6 °F (37 °C) Oral 84 18 93 % --   22 -- -- -- -- -- 92 % --   22 1948 (!) 153/61 97.9 °F (36.6 °C) Oral 96 18 92 % --   22 1645 (!) 127/52 97.9 °F (36.6 °C) Axillary 92 16 92 % --      TEMPERATURE HISTORY 24H: Temp (24hrs), Av.2 °F (36.8 °C), Min:97.8 °F (36.6 °C), Max:98.7 °F (37.1 °C)    BLOOD PRESSURE HISTORY: Systolic (65BLU), LYL:020 , Min:127 , QGF:014    Diastolic (76CGB), MFS:53, Min:52, Max:73      Intake/Output:  I/O last 3 completed shifts:   In: 1320 [P.O.:1320]  Out: -   I/O this shift:  In: 365 [P.O.:360; I.V.:5]  Out: -   Drain/tube Output:       Physical Exam:  General: awake, alert, oriented to  person, place, time  Cardiovascular:  regular rate and rhythm and no murmur noted  Lungs: clear to auscultation  Abdomen: soft, nontender, nondistended, bowel sounds normal     Labs:  CBC:    Recent Labs     06/15/22  1033 06/16/22  0648 06/17/22  0647   WBC 17.5* 7.8 10.4   HGB 8.7* 7.5* 7.5*   HCT 28.6* 24.4* 24.8*    108* 126*     BMP:    Recent Labs     06/15/22  1033 06/16/22  0648 06/17/22  0647    141 145   K 4.4 4.6 4.6   CL 96* 99 100   CO2 37* 36* 37*   BUN 34* 31* 38*   CREATININE 1.4* 1.2 1.3*   GLUCOSE 98 138* 150*     Hepatic:    Recent Labs     06/15/22  1033   AST 45*   ALT 36   BILITOT 0.8   ALKPHOS 83     Amylase:    Lab Results   Component Value Date    AMYLASE 74 02/16/2013     Lipase:    Lab Results   Component Value Date    LIPASE 31.0 11/21/2020    LIPASE 40.0 09/19/2020    LIPASE 30.0 09/18/2020      Mag:    Lab Results   Component Value Date    MG 1.70 09/20/2020    MG 1.90 09/19/2020     Phos:     Lab Results   Component Value Date    PHOS 2.8 09/20/2020    PHOS 2.5 09/19/2020      Coags:   Lab Results   Component Value Date    PROTIME 13.3 09/19/2020    INR 1.14 09/19/2020    APTT 26.2 09/19/2020       Cultures:  Anaerobic culture  No results found for: LABANAE  Fungus stain  No results found for requested labs within last 30 days. Gram stain  No results found for requested labs within last 30 days. Organism  Lab Results   Component Value Date/Time    ORG Escherichia coli (A) 09/18/2020 09:53 AM     Surgical culture  No results found for: CXSURG  Blood culture 1  No results found for requested labs within last 30 days. Blood culture 2  No results found for requested labs within last 30 days. Fecal occult  Results in Past 30 Days  Result Component Current Result Ref Range Previous Result Ref Range   Occult Blood Diagnostic Result: POSITIVE  Normal range: Negative   (A) (6/11/2022)  Not in Time Range      GI bacterial pathogens by PCR  No results found for requested labs within last 30 days.      C. difficile  No results found for requested labs within last 30 days. Urine culture  Lab Results   Component Value Date    LABURIN >100,000 CFU/ml 09/18/2020       Pathology:  EGD/colonoscopy 6/15/2022--FINAL DIAGNOSIS:     A. Duodenum, biopsy:   - No diagnostic abnormality.   - No histologic evidence of Celiac sprue. B.  Right colon ulcer, biopsy:   -Invasive adenocarcinoma, well to moderately differentiated. C. Colon polyp, transverse:   - Tubular adenoma. - Negative for high-grade dysplasia or malignancy. D.  Colon polyp, sigmoid x4:   - Hyperplastic polyp (4 fragments). Comment: Sections of A show multiple pieces of unremarkable small   intestinal mucosa with slender villi and intact microvilli.  There is no   inflammation, atrophy, intraepithelial lymphocytosis or parasites. In   rare cases, non celiac gluten sensitivity (NCGS)  and bacterial   overgrowth can cause diarrhea and steatorrhea without causing histologic   abnormality in biopsy. Clinical correlation is recommended. Imaging:  I have personally reviewed the following films:    Echo Complete    Result Date: 6/17/2022  Transthoracic Echocardiography Report (TTE)  Demographics   Patient Name       Johnny Guzmán   Date of Study      06/17/2022         Gender              Female   Patient Number     [id number]         Date of Birth       1940   Visit Number       [id number]          Age                 79 year(s)   Accession Number   [id number]         Room Number         1062   Corporate ID       [id number]            RT Brigida   Ordering Physician Gregorio King MD,                     RN                 Physician           [facility]  Procedure Type of Study   TTE procedure:ECHOCARDIOGRAM COMPLETE 2D W DOPPLER W COLOR.   Procedure Date Date: 06/17/2022 Start: 09:21 AM Study Location: Portable Technical Quality: Limited visualization due to patient immobility. Indications:Dyspnea/SOB. Patient Status: Routine Height: 62 inches Weight: 197 pounds BSA: 1.9 m2 BMI: 36.03 kg/m2 HR: 90 bpm BP: 148/61 mmHg  Conclusions   Summary  -Normal left ventricle size, wall thickness and systolic function with an  estimated ejection fraction of 55-60%. -No regional wall motion abnormalities are seen. -Diastolic filling parameters suggest grade I-II diastolic  dysfunction. E/e\"=20.7.  -Mitral annular calcification is present. -Mild calcification of the mitral valve noted. -Mild mitral regurgitation.  -The aortic valve is mildly calcified with mild decreased leaflet mobility  consistent with mild aortic stenosis. -Mild to moderate tricuspid regurgitation.  -Estimated pulmonary artery systolic pressure is moderately elevated at  50-55 mmHg assuming a right atrial pressure of 8 mmHg. Signature   ------------------------------------------------------------------  Electronically signed by Mary Aguirre MD, [facility] [place], [address]  (Interpreting physician) on 06/17/2022 at 10:54 AM  ------------------------------------------------------------------   Findings   Left Ventricle  Normal left ventricle size, wall thickness and systolic function with an  estimated ejection fraction of 55-60%. No regional wall motion abnormalities  are seen. Diastolic filling parameters suggest grade I-II diastolic  dysfunction. E/e\"=20.7. Mitral Valve  Mitral valve is structurally normal.  Mitral annular calcification is present. Mild calcification of the mitral valve noted. Mild mitral regurgitation. Left Atrium  The left atrium is normal in size. Aortic Valve  The aortic valve is mildly calcified with mild decreased leaflet mobility  consistent with mild aortic stenosis. Mild aortic stenosis with a peak velocity of 2.19m/s and a mean pressure  gradient of 11mmHg. No evidence of significant aortic valve regurgitation. Aorta  The aortic root is normal in size.    Right Ventricle  The right ventricle is not well visualized. Normal right ventricular size and function. TAPSE 2.4 cm. Tricuspid Valve  Tricuspid valve is structurally normal.  Mild to moderate tricuspid regurgitation. Right Atrium  Right atrium is not well visualized. The right atrium appears normal in size. Pulmonic Valve  The pulmonic valve is not well visualized. There is no evidence of pulmonic valve regurgitation or stenosis. Pericardial Effusion  No pericardial effusion noted. Pleural Effusion  There is a mild to moderate right pleural effusion. Miscellaneous  IVC size is normal (<2.1 cm) but collapses < 50% with respiration consistent  with elevated RA pressure (8 mmHg). Estimated pulmonary artery systolic pressure is moderately elevated at 50-55  mmHg assuming a right atrial pressure of 8 mmHg.   M-Mode/2D Measurements (cm)   LV Diastolic Dimension: 5.71 cm LV Systolic Dimension: 3 cm  LV Septum Diastolic: 4.17 cm  LV PW Diastolic: 2.38 cm        AO Root Dimension: 2.2 cm                                  LA Dimension: 3.4 cm                                  LA Area: 16.5 cm2                                  LA volume/Index: 43.8 ml /23 ml/m2  Doppler Measurements   AV Peak Velocity: 219 cm/s     MV Peak E-Wave: 135 cm/s  AV Peak Gradient: 19.18 mmHg   MV Peak A-Wave: 133 cm/s  AV Mean Gradient: 11 mmHg      MV E/A Ratio: 1.02  LVOT Peak Velocity: 124 cm/s   MV Mean Gradient: 6 mmHg                                 MV Max P mmHg  TR Velocity:344 cm/s           MV Vmax:172 cm/s  TR Gradient:47.33 mmHg         MV VTI:30.5 cm/s  Estimated RAP:8 mmHg  Estimated RVSP: 55 mmHg        MV Deceleration Time: 121 msec  E' Septal Velocity: 5.77 cm/s  E' Lateral Velocity: 7.51 cm/s  E/E' ratio: 20.7   Aortic Valve   Peak Velocity: 219 cm/s   Mean Velocity: 149 cm/s  Peak Gradient: 19.18 mmHg Mean Gradient: 11 mmHg  AV VTI: 43.9 cm  Aorta   Aortic Root: 2.2 cm      CT ABDOMEN PELVIS W IV CONTRAST Additional Contrast? None    Result Date: 6/15/2022  EXAMINATION: CT OF THE ABDOMEN AND PELVIS WITH CONTRAST 6/15/2022 6:38 pm TECHNIQUE: CT of the abdomen and pelvis was performed with the administration of intravenous contrast. Multiplanar reformatted images are provided for review. Automated exposure control, iterative reconstruction, and/or weight based adjustment of the mA/kV was utilized to reduce the radiation dose to as low as reasonably achievable. COMPARISON: 11/21/2020 HISTORY: ORDERING SYSTEM PROVIDED HISTORY: Right colon mass -- r/o metastatic disease TECHNOLOGIST PROVIDED HISTORY: Reason for exam:->Right colon mass -- r/o metastatic disease Additional Contrast?->None Reason for Exam: Right colon mass -- r/o metastatic disease FINDINGS: Lower Chest: Small bilateral pleural effusions. There is consolidation and ground-glass in both lung bases. Organs: The liver is diffusely hypoattenuating. Cholelithiasis without pericholecystic fluid. No acute abnormality within the spleen, pancreas, or adrenal glands. No nephrolithiasis or hydronephrosis. There is bilateral renal cortical scarring. GI/Bowel: Stomach is partially distended. There are multiple mildly distended small bowel loops without discrete transition point. Prior right hemicolectomy. The remaining colon is nondilated. There are noninflamed sigmoid diverticula. Pelvis: The bladder is partially distended without vesicular stone. Uterus is present. Peritoneum/Retroperitoneum: Small ascites. No pneumoperitoneum. Atherosclerosis in the nondilated abdominal aorta. Bones/Soft Tissues: Multilevel degenerative changes of the lumbar spine. 1. Mildly distended loops of small bowel in the lower abdomen without discrete transition point, which could be due to partial obstruction or ileus. Clinical correlation is recommended. 2. Prior right hemicolectomy. The remaining colon is nondilated with scattered noninflamed diverticula.  3. Consolidation and ground-glass in the lung bases with small bilateral pleural effusions, concerning for pneumonia. 4. Ascites. XR CHEST PORTABLE    Result Date: 6/17/2022  EXAMINATION: ONE XRAY VIEW OF THE CHEST 6/17/2022 2:27 pm COMPARISON: Chest x-ray dated 12 June 2022 HISTORY: ORDERING SYSTEM PROVIDED HISTORY: Hypoxia TECHNOLOGIST PROVIDED HISTORY: Reason for exam:->Hypoxia Reason for Exam: short of breath is worse per pt FINDINGS: Small bilateral pleural effusions with bibasilar airspace opacities. Also patchy airspace disease in the left upper lobe. No pneumothorax. Stable cardiomediastinal silhouette     Small bilateral pleural effusions with bilateral airspace opacities consistent with pneumonia. Scheduled Meds:   furosemide  20 mg Oral Daily    methylPREDNISolone  40 mg IntraVENous Q6H    guaiFENesin  600 mg Oral BID    pantoprazole  40 mg Oral BID AC    budesonide  0.5 mg Nebulization BID    Arformoterol Tartrate  15 mcg Nebulization BID    ipratropium-albuterol  1 ampule Inhalation Q4H    sodium chloride flush  5-40 mL IntraVENous 2 times per day    amLODIPine  10 mg Oral Nightly    metoprolol tartrate  50 mg Oral BID     Continuous Infusions:   sodium chloride      sodium chloride      sodium chloride 100 mL/hr at 06/15/22 1218     PRN Meds:.calcium carbonate, perflutren lipid microspheres, sodium chloride, albuterol, sodium chloride, sodium chloride flush, sodium chloride, ondansetron **OR** ondansetron, polyethylene glycol, acetaminophen **OR** acetaminophen, labetalol      Assessment:  79 y.o. female admitted with   1. Blood loss anemia    2. Hypoxia    3. COPD exacerbation ([facility] [address]    4. Anemia, unspecified type        Colon mass, just distal to ileocolonic anastomosis, pathology reveals invasive adenocarcinoma  History of colon cancer with prior right colon resection  Acute blood loss anemia  COPD exacerbation  Hypertension       Plan:  1.  Tentatively planning for open segmental colon resection with Dr. Pia Betancourt next Tuesday (6/21/2022) pending improvement of respiratory status  2. Regular diet as tolerated; monitor bowel function  3. Activity as tolerated  4. Management of medical comorbid etiologies per primary team and consulting services    EDUCATION:  Educated patient on plan of care and disease process--all questions answered. Plans discussed with patient and nursing. Reviewed and discussed with Dr. Pia Betancourt. Signed:  SHAN Bland - CNP  6/17/2022 3:37 PM     19-year-old female with severe COPD seen in follow-up for a colon cancer just distal to a previous ilio colonic anastomosis. Tentatively planning for segmental colon resection Tuesday (4 days from now) if patient is cleared from a pulmonology standpoint.

## 2022-06-17 NOTE — PROGRESS NOTES
PULMONARY AND CRITICAL CARE MEDICINE PROGRESS NOTE    Subjective: Patient sitting in bed in mild respiratory distress. Oxygen requirements currently at 13 L/min with patient saturating in mid to high 90s. Continues to report wheezing and chest congestion. Unable to expectorate. No apparent respiratory distress. Continues to report stable breathing. Cough and expectoration also improving. Remains on 6 L/min of oxygen supplementation with oxygen saturation in mid 90s. With exertion she does desaturate. Plan to undergo EGD and colonoscopy today. REVIEW OF SYSTEMS:   8 point review of system is negative except that mentioned in the subjective portion. PAST MEDICAL HISTORY:   Past Medical History:   Diagnosis Date    Cataracts, bilateral     Cervical cancer screening     Nml per pt'    COPD (chronic obstructive pulmonary disease) ([facility] [address]     Portable oxygen:2 L/min    Gall stones     H/O colonoscopy     polyp    H/O mammogram     Nml per pt'.     Hypertension 2010    Morbid obesity with BMI of 40.0-44.9, adult ([facility] [address] 2020       PAST SURGICAL HISTORY:   Past Surgical History:   Procedure Laterality Date    COLON SURGERY      colon cancer:s/p surgery per pt'    COLONOSCOPY N/A 6/15/2022    COLONOSCOPY WITH BIOPSY performed by Caitlin Delarosa MD at [address] [facility]  6/15/2022    COLONOSCOPY POLYPECTOMY SNARE/COLD BIOPSY performed by Caitlin Delarosa MD at [address]  6/15/2022    COLONOSCOPY SUBMUCOSAL/ INJECTION performed by Caitlin Delarosa MD at [address] [place] [address] N/A 6/15/2022    EGD BIOPSY performed by Caitlin Delarosa MD at [place] [address]. HISTORY:   Social History     Tobacco Use    Smoking status: Former Smoker     Packs/day: 1.00     Years: 30.00     Pack years: 30.00     Types: Cigarettes     Quit date: 10/14/1995     Years since quittin.6    Smokeless tobacco: Never Used   Vaping Use    Vaping Use: Never used   Substance Use Topics    Alcohol use: No    Drug use: No       FAMILY HISTORY:   Family History   Problem Relation Age of Onset    Heart Disease Father     No Known Problems Mother     Cancer Sister     Arrhythmia Brother     No Known Problems Maternal Grandmother     No Known Problems Maternal Grandfather     No Known Problems Paternal Grandmother     No Known Problems Paternal Grandfather     No Known Problems Other     Cancer Sister     Cancer Brother     Cancer Brother        MEDICATIONS:     No current facility-administered medications on file prior to encounter. Current Outpatient Medications on File Prior to Encounter   Medication Sig Dispense Refill    hydroCHLOROthiazide (HYDRODIURIL) 25 MG tablet Take 25 mg by mouth daily      fluticasone-salmeterol (ADVAIR DISKUS) 250-50 MCG/DOSE AEPB Inhale 1 puff into the lungs every 12 hours      albuterol sulfate  (90 BASE) MCG/ACT inhaler Inhale 2 puffs into the lungs every 4 hours as needed for Wheezing or Shortness of Breath 1 Inhaler 11    amLODIPine (NORVASC) 10 MG tablet TAKE 1 TABLET BY MOUTH DAILY 30 tablet 0    SPIRIVA HANDIHALER 18 MCG inhalation capsule INHALE 1 CAPSULE INTO THE LUNGS DAILY USING THE HANDIHALER 30 capsule 0    metoprolol tartrate (LOPRESSOR) 25 MG tablet Take 1 tablet by mouth 2 times daily (Patient taking differently: Take 50 mg by mouth daily ) 60 tablet 1    ketoconazole (NIZORAL) 2 % cream Apply topically daily Apply topically daily.           furosemide  20 mg Oral Daily    methylPREDNISolone  40 mg IntraVENous Q6H    guaiFENesin  600 mg Oral BID    pantoprazole  40 mg Oral BID AC    budesonide  0.5 mg Nebulization BID    Arformoterol Tartrate  15 mcg Nebulization BID    ipratropium-albuterol  1 ampule Inhalation Q4H    sodium chloride flush  5-40 mL IntraVENous 2 times per day    amLODIPine  10 mg Oral Nightly    metoprolol tartrate  50 mg Oral BID      sodium chloride      sodium chloride      sodium chloride 100 mL/hr at 06/15/22 1218     calcium carbonate, perflutren lipid microspheres, sodium chloride, albuterol, sodium chloride, sodium chloride flush, sodium chloride, ondansetron **OR** ondansetron, polyethylene glycol, acetaminophen **OR** acetaminophen, labetalol      ALLERGIES:   Allergies as of 06/11/2022 - Fully Reviewed 06/11/2022   Allergen Reaction Noted    Latex  06/11/2022    Banana  06/11/2022    Azithromycin  05/13/2015    Codeine  05/13/2015      OBJECTIVE:   height is 5' 2\" (1.575 m) and weight is 194 lb 1.6 oz (88 kg). Her oral temperature is 98.7 °F (37.1 °C). Her blood pressure is 154/66 (abnormal) and her pulse is 90. Her respiration is 18 and oxygen saturation is 95%. I/O this shift:  In: 245 [P.O.:240; I.V.:5]  Out: -      PHYSICAL EXAM:    CONSTITUTIONAL: She is a 79y.o.-year-old who appears her stated age. She is alert and oriented x 3 and in no acute distress. HEENT: PERRLA, EOMI. No scleral icterus. No thrush, atraumatic, normocephalic. Mild oral erythema seen. NECK: Supple, without cervical or supraclavicular lymphadenopathy:  CARDIOVASCULAR: S1 S2 RRR. Without murmer  RESPIRATORY & CHEST: Bilateral scattered wheezing heard. GASTROINTESTINAL & ABDOMEN: Soft, nontender, positive bowel sounds in all quadrants, non-distended, without hepatosplenomegaly. GENITOURINARY: Deferred. MUSCULOSKELETAL: No tenderness to palpation of the axial skeleton. There is no clubbing. No cyanosis. No edema of the lower extremities. SKIN OF BODY: No rash or jaundice. PSYCHIATRIC EVALUATION: Normal affect. Patient answers questions appropriately. HEMATOLOGIC/LYMPHATIC/ IMMUNOLOGIC: No palpable lymphadenopathy. NEUROLOGIC: Alert and oriented x 3. Groslly non-focal. Motor strength is 5+/5 in all muscle groups. The patient has a normal sensorium globally.       LABS:  Recent Labs     06/15/22  1033 06/16/22  4912 06/17/22  0647   WBC 17.5* 7.8 10.4   HGB 8.7* 7.5* 7.5*   HCT 28.6* 24.4* 24.8*    108* 126*   ALT 36  --   --    AST 45*  --   --     141 145   K 4.4 4.6 4.6   CL 96* 99 100   CREATININE 1.4* 1.2 1.3*   BUN 34* 31* 38*   CO2 37* 36* 37*       Recent Labs     06/15/22  1033 06/16/22  0648 06/17/22  0647   GLUCOSE 98 138* 150*   CALCIUM 9.6 8.9 8.8    141 145   K 4.4 4.6 4.6   CO2 37* 36* 37*   CL 96* 99 100   BUN 34* 31* 38*   CREATININE 1.4* 1.2 1.3*       No results for input(s): PHART, ZYM9LBO, PO2ART, YMV7NGY, J1ZVSNXY, BEART, M5XJJOUM in the last 72 hours.     Lab Results   Component Value Date    INR 1.14 09/19/2020    INR 1.05 06/28/2015    PROTIME 13.3 (H) 09/19/2020    PROTIME 11.4 06/28/2015     Lab Results   Component Value Date    AMYLASE 74 02/16/2013      No results found for: LABA1C  No results found for: EAG  No results found for: TSH, N7WGOME, K8PLQRL, THYROIDAB, FT3, T4FREE  Lab Results   Component Value Date    TROPONINI <0.01 06/11/2022      No results found for: CRP   Lab Results   Component Value Date    BNP 66.1 05/28/2013      Lab Results   Component Value Date    DDIMER >5250 (H) 06/28/2015      Lab Results   Component Value Date    FERRITIN 22.3 06/11/2022      Lab Results   Component Value Date    LACTA 1.0 09/19/2020           IMAGING:    Narrative   EXAMINATION:   ONE XRAY VIEW OF THE CHEST       6/12/2022 6:06 pm           FINDINGS:   Cardiac silhouette enlargement again demonstrated.  Emphysematous changes   again noted.  Vascular congestion is similar compared to the prior exam.  No   evidence for overt edema.  No consolidation identified.  No pneumothorax or   significant effusion.           Impression   Chronic findings in the chest.  Similar appearance of vascular prominence   without overt edema.         Transthoracic echocardiogram done on 6/17/2022  Normal left ventricle size, wall thickness and systolic function with an    estimated ejection fraction of 55-60%.  -No regional wall motion abnormalities are seen.    -Diastolic filling parameters suggest grade I-II diastolic    dysfunction. E/e\"=20.7.    -Mitral annular calcification is present.    -Mild calcification of the mitral valve noted.    -Mild mitral regurgitation.    -The aortic valve is mildly calcified with mild decreased leaflet mobility    consistent with mild aortic stenosis.    -Mild to moderate tricuspid regurgitation.    -Estimated pulmonary artery systolic pressure is moderately elevated at    50-55 mmHg assuming a right atrial pressure of 8 mmHg.        Signature     IMPRESSION:     1. COPD of unknown severity in mild exacerbation  2. Severe symptomatic anemia  3. Recurrent invasive adenocarcinoma of the colon  4. Acute on chronic hypoxic respiratory failure  5. Previous history of tobacco abuse    RECOMMENDATION:     1. Patient's respiratory status remains borderline. 2. Today again she is wheezing requiring up to 13 L/min of oxygen supplementation. 3. Suspect that she has a combination of mild COPD exacerbation and pulm edema. 4. Continue with Pulmicort, Brovana nebulization twice a day. 5. She can continue to receive DuoNeb around-the-clock. 6. I will add MetaNeb therapy to the regimen to help her get intermittent positive pressure breathing. 7. Continue with Solu-Medrol is going at 40 mg IV every 8 hours. I will increase the frequency to every 6 hours. 8. Titrate oxygen flow to maintain SPO2 between 88 to 92%. 9. Low suspicion for pulm infection. Procalcitonin levels remain within normal limit. No indications for antibiotics. 10. Repeat pathology from the colon lesion shows invasive adenocarcinoma. Patient scheduled for hemicolectomy. We will have to postpone the surgery till her respiratory status is stabilized. 11. Out of bed into chair. 12. Encourage incentive spirometry. We will continue to follow the patient.       Rosalina King MD  Pulmonary Critical Care and Sleep Medicine  6/11/2022, 11:44 AM    This note was completed using dragon medical speech recognition software. Grammatical errors, random word insertions, pronoun errors and incomplete sentences are occasional consequences of this technology due to software limitations. If there are questions or concerns about the content of this note of information contained within the body of this dictation they should be addressed with the provider for clarification.

## 2022-06-17 NOTE — PROGRESS NOTES
Upon completion of physical therapy, patient's blood pressure was 164/66. Patient had new order for daily Lasix PO, BP after administration back down to 154/66. Therefore, PRN dose of labetalol was not given.

## 2022-06-17 NOTE — PROGRESS NOTES
Nutrition Note    RECOMMENDATIONS  No nutrition rec's @ this time. NUTRITION ASSESSMENT   Pt's nutrition status is stable AEB po intake % of meals on a regular diet with no significant wt changes to report. Skin is intact. Awaiting d/c home. Pt expresses no nutrition concerns @ this time. Will con't to monitor progress.  Nutrition Related Findings: +5L; +1 RLE; LBM 6/16   Wounds: None   Nutrition Education:  Education not indicated    Nutrition Goals: PO intake 50% or greater     MALNUTRITION ASSESSMENT      Malnutrition Status: No malnutrition    NUTRITION DIAGNOSIS   No nutrition diagnosis at this time    CURRENT NUTRITION THERAPIES  ADULT DIET; Regular     PO Intake: 26-50%,%   PO Supplement Intake:None Ordered    ANTHROPOMETRICS   Current Height: 5' 2\" (157.5 cm)   Current Weight: 194 lb 1.6 oz (88 kg)     Ideal Body Weight (IBW): 110 lbs  (50 kg)      BMI: 35.5    The patient will be monitored per nutrition standards of care. Consult dietitian if additional nutrition interventions are needed prior to RD reassessment.      Mauro Valdivia RD, LD    Contact: [phone]

## 2022-06-18 PROBLEM — D62 ACUTE BLOOD LOSS ANEMIA: Status: ACTIVE | Noted: 2020-09-22

## 2022-06-18 NOTE — PROGRESS NOTES
Patient sat declining, O2 was increased to 10L high flow. Sat 90%. Respiratory notified.  Will continue to monitor

## 2022-06-18 NOTE — OP NOTE
Central Venous Catheter Insertion Procedure Note    Procedure: Insertion of Central Venous Catheter    Indications:  Hypotension    Procedure Details   Informed consent was not obtained, since procedure was performed as an emergency. Under sterile conditions the skin above the on the right internal jugular vein was prepped with betadine and covered with a sterile drape. Local anesthesia was applied to the skin and subcutaneous tissues. A 22-gauge needle was used to identify the vein. An 18-gauge needle was then inserted into the vein. A guide wire was then passed easily through the catheter. There were no arrhythmias. The catheter was then withdrawn. A 7.0 Tajik triple-lumen central venous catheter was then inserted into the vessel over the guide wire. The catheter was sutured into place. Findings: There were no changes to vital signs. Catheter was flushed with 10 cc NS. Patient did tolerate procedure well. EBL: 3cc    Recommendations:  CXR ordered to verify placement.

## 2022-06-18 NOTE — CONSULTS
[address] [facility]  [phone]      Chief Complaint   Patient presents with    Shortness of Breath     Using nebulizer without relief; increasing SOB; just finished abx from urgent care; brought in by [facility]. Reason for consult:  AF    ASSESSMENT AND PLAN:    1. Paroxysmal atrial fib, now in sinus tachycardia  2. Acute blood loss anemia  3. Hemorrhagic +/- septic shock  4. Colon cancer, new Dx  5. Elevated BNP  6. Acute hypoxemic respiratory failure, now intubated    Plan  1. Due to her recent massive GI bleed, she should not be on anticoagulation, even though she had transient atrial fibrillation, especially given that her colon adenocarcinoma may easily start bleeding and starting anticoagulation now could lead to a fatal bleed. Would be reasonable to start anticoagulation after her colectomy once ok with general surgery    2. She is already back in sinus rhythm    3. She is likely to switch in and out of AF due to the severity of her underlying medical condition; if she goes into AF with an elevated HR, if there is any concern about active bleeding, would avoid any rate-lowering drugs unless in a short-acting IV form (I.e. esmolol or diltiazem) given that prolonged beta blockade could precipitate severe shock in the setting of a bleed    4. Remainder of issues per critical care team    Cardiology will sign-off at this time. Please call us if there are other issues or questions. History of Present Illness:  Latricia Rios is a 79 y.o. patient who presented to the hospital with complaints of rectal bleeding and dyspnea. I have been asked to provide consultation regarding further management and testing. SHe was found to have significant acute blood loss anemia with drop of 6 grams in her hemoglobin, scope showed adenocarcinoma that will require colectomy for cure.     Last night she had AF on EKG, although she went into sinus this am.  This am, around the time I received the consult to see her for AF, she became acutely dyspneic, hypotensive, and had to be intubated and started on levophed, thus I can't get history from patient, but per chart and per other providers. Past Medical History:   has a past medical history of Cataracts, bilateral, Cervical cancer screening, COPD (chronic obstructive pulmonary disease) ([facility] [address] Gall stones, H/O colonoscopy, H/O mammogram, Hypertension, and Morbid obesity with BMI of 40.0-44.9, adult ([facility] [address] Surgical History:   has a past surgical history that includes Colon surgery (1990); Upper gastrointestinal endoscopy (N/A, 6/15/2022); Colonoscopy (N/A, 6/15/2022); Colonoscopy (6/15/2022); and Colonoscopy (6/15/2022). Social History:   reports that she quit smoking about 26 years ago. Her smoking use included cigarettes. She has a 30.00 pack-year smoking history. She has never used smokeless tobacco. She reports that she does not drink alcohol and does not use drugs. Family History:  No family history of premature coronary artery disease, aortic disease, or valve disease. Home Medications:  Were reviewed and are listed in nursing record. and/or listed below  Prior to Admission medications    Medication Sig Start Date End Date Taking?  Authorizing Provider   hydroCHLOROthiazide (HYDRODIURIL) 25 MG tablet Take 25 mg by mouth daily   Yes Historical Provider, MD   fluticasone-salmeterol (ADVAIR DISKUS) 250-50 MCG/DOSE AEPB Inhale 1 puff into the lungs every 12 hours    Historical Provider, MD   albuterol sulfate  (90 BASE) MCG/ACT inhaler Inhale 2 puffs into the lungs every 4 hours as needed for Wheezing or Shortness of Breath 5/15/17   Guicho Mcintyre MD   amLODIPine (NORVASC) 10 MG tablet TAKE 1 TABLET BY MOUTH DAILY 3/17/17   Frankie Schaeffer MD   SPIRIVA HANDIHALER 18 MCG inhalation capsule INHALE 1 CAPSULE INTO THE LUNGS DAILY USING THE HANDIHALER 3/17/17   Frankie Schaeffer MD   metoprolol tartrate (LOPRESSOR) 25 MG tablet Take 1 tablet by mouth 2 times daily  Patient taking differently: Take 50 mg by mouth daily  10/14/16   Mojgan Conklin MD   ketoconazole (NIZORAL) 2 % cream Apply topically daily Apply topically daily.     Historical Provider, MD        Current Medications:  Current Facility-Administered Medications   Medication Dose Route Frequency Provider Last Rate Last Admin    furosemide (LASIX) injection 20 mg  20 mg IntraVENous Once Kasey Larios MD        furosemide (LASIX) injection 40 mg  40 mg IntraVENous Once Jamie Sheldon MD        propofol injection  5-50 mcg/kg/min IntraVENous Continuous Jamie Sheldon MD        fentaNYL 10 mcg/mL infusion   mcg/hr IntraVENous Continuous Jamie Sheldon MD        norepinephrine-sodium chloride (LEVOPHED) 16-0.9 MG/250ML-% infusion             furosemide (LASIX) injection 40 mg  40 mg IntraVENous BID Jamie Sheldon MD        methylPREDNISolone sodium (SOLU-MEDROL) injection 40 mg  40 mg IntraVENous Q6H Santy Latham MD   40 mg at 06/18/22 8116    calcium carbonate (TUMS) chewable tablet 500 mg  500 mg Oral TID PRN SHAN Lewis CNP        guaiFENesin [facility]) extended release tablet 600 mg  600 mg Oral BID SHAN Lewis CNP   600 mg at 06/17/22 2141    perflutren lipid microspheres (DEFINITY) injection 1.65 mg  1.5 mL IntraVENous ONCE PRN SHAN Lewis CNP        0.9 % sodium chloride infusion   IntraVENous PRN Caitlin Delarosa MD        pantoprazole (PROTONIX) tablet 40 mg  40 mg Oral BID AC Caitlin Delarosa MD   40 mg at 06/18/22 0987    budesonide (PULMICORT) nebulizer suspension 500 mcg  0.5 mg Nebulization BID Caitlin Delarosa MD   500 mcg at 06/17/22 2007    Arformoterol Tartrate (BROVANA) nebulizer solution 15 mcg  15 mcg Nebulization BID Caitlin Delarosa MD   15 mcg at 06/17/22 2007    albuterol (PROVENTIL) nebulizer solution 2.5 mg  2.5 mg Nebulization Q4H PRN Caitlin Delarosa MD        ipratropium-albuterol (DUONEB) nebulizer solution 1 ampule  1 ampule Inhalation Q4H Montrell Hernandez MD   1 ampule at 06/18/22 0333    0.9 % sodium chloride infusion   IntraVENous PRN Montrell Hernandez MD        sodium chloride flush 0.9 % injection 5-40 mL  5-40 mL IntraVENous 2 times per day Montrell Hernandez MD   10 mL at 06/17/22 2141    sodium chloride flush 0.9 % injection 5-40 mL  5-40 mL IntraVENous PRN Montrell Hernandez MD   10 mL at 06/18/22 0642    0.9 % sodium chloride infusion   IntraVENous PRN Montrell Hernandez  mL/hr at 06/15/22 1218 Restarted at 06/15/22 1306    ondansetron (ZOFRAN-ODT) disintegrating tablet 4 mg  4 mg Oral Q8H PRN Montrell Hernandez MD        Or    ondansetron [facility]) injection 4 mg  4 mg IntraVENous Q6H PRN Montrell Henrandez MD   4 mg at 06/16/22 0102    polyethylene glycol (GLYCOLAX) packet 17 g  17 g Oral Daily PRN Montrell Hernandez MD        acetaminophen (TYLENOL) tablet 650 mg  650 mg Oral Q6H PRN Montrell Hernandez MD   650 mg at 06/18/22 1568    Or    acetaminophen (TYLENOL) suppository 650 mg  650 mg Rectal Q6H PRN Montrell Hernandez MD        amLODIPine (NORVASC) tablet 10 mg  10 mg Oral Nightly Montrell Hernandez MD   10 mg at 06/17/22 2141    metoprolol tartrate (LOPRESSOR) tablet 50 mg  50 mg Oral BID Montrell Hernandez MD   50 mg at 06/17/22 2141    labetalol (NORMODYNE;TRANDATE) injection 5 mg  5 mg IntraVENous Q4H PRN Montrell Hernandez MD   5 mg at 06/15/22 0012        Allergies:  Latex, Banana, Azithromycin, and Codeine     Review of Systems:   Unable to attain due to critical illness    Physical Examination:    Vitals:    06/18/22 0941   BP: (!) 166/70   Pulse: (!) 118   Resp: 27   Temp: 97.8 °F (36.6 °C)   SpO2: (!) 84%    Weight: 194 lb 1.6 oz (88 kg)       Body mass index is 35.5 kg/m².     Gen: Intubated/sedated on mechanical ventilator  HEENT: NCAT  Neck: No JVD  Chest: Diffuse crackles  CV: RRR no MGR  Abd: soft NTND  Ext: warm well perfused no edema      Labs  Lab Results   Component Value Date    PROBNP 1,950 06/16/2022    PROBNP 1,159 06/11/2022    PROBNP 335 06/28/2015         Lab Results   Component Value Date    CHOL 213 05/29/2013    TRIG 78 05/29/2013    HDL 42 05/29/2013    HDL 50 03/02/2012    LDLCALC 155 05/29/2013    LABVLDL 16 05/29/2013         Troponin   Date/Time Value Ref Range Status   06/11/2022 04:40 PM <0.01 <0.01 ng/mL Final     Comment:     Methodology by Troponin T   09/18/2020 10:39 AM <0.01 <0.01 ng/mL Final     Comment:     Methodology by Troponin T   06/28/2015 08:45 PM <0.01 <0.01 ng/mL Final     Comment:     Methodology by Troponin T           CBC:   Lab Results   Component Value Date    WBC 18.7 06/18/2022    RBC 3.26 06/18/2022    HGB 7.9 06/18/2022    HCT 26.4 06/18/2022    MCV 81.0 06/18/2022    RDW 21.2 06/18/2022     06/18/2022     CMP:    Lab Results   Component Value Date     06/18/2022    K 3.9 06/18/2022    K 3.7 06/11/2022    CL 95 06/18/2022    CO2 44 06/18/2022    BUN 43 06/18/2022    CREATININE 1.2 06/18/2022    GFRAA 52 06/18/2022    GFRAA >60 05/29/2013    AGRATIO 1.8 06/11/2022    LABGLOM 43 06/18/2022    GLUCOSE 179 06/18/2022    PROT 6.5 06/15/2022    PROT 6.8 02/16/2013    CALCIUM 9.1 06/18/2022    BILITOT 0.8 06/15/2022    ALKPHOS 83 06/15/2022    AST 45 06/15/2022    ALT 36 06/15/2022     PT/INR:  No results found for: PTINR  Lab Results   Component Value Date    TROPONINI <0.01 06/11/2022       EKG:  I have reviewed EKG with the following interpretation:  Impression:  Atrial fib (last night) now in sinus    Echo:   6/16  -Normal left ventricle size, wall thickness and systolic function with an estimated ejection fraction of 55-60%. -No regional wall motion abnormalities are seen. -Diastolic filling parameters suggest grade I-II diastolic dysfunction. E/e\"=20.7.  -Mitral annular calcification is present. -Mild calcification of the mitral valve noted.   -Mild mitral regurgitation.  -The aortic valve is mildly calcified with mild decreased leaflet mobility consistent with mild aortic stenosis. -Mild to moderate tricuspid regurgitation.  -Estimated pulmonary artery systolic pressure is moderately elevated at 50-55 mmHg assuming a right atrial pressure of 8 mmHg. High complexity/medical decision making due to extensive data review, extensive history review, independent review of data    High risk due to acute illness, evaluation of drug-drug interactions, medication management and diagnostic interventions    Thank you for allowing to us to participate in the care or María Powell. Further evaluation will be based upon the patient's clinical course and testing results.         Patrice Ortega MD, MD 6/18/2022 11:38 AM

## 2022-06-18 NOTE — ACP (ADVANCE CARE PLANNING)
Advanced Care Planning Note. Purpose of Encounter: Advanced care planning in light of increasing O2 needs and recurrent colon cancer  Parties In Attendance: Patient and myself  Decisional Capacity: Yes  Subjective: Patient understands that respiratory status continues to worsen. Understands newly diagnosed colon cancer and need for colon resection. She desires continued aggressive care at present. Objective: Increasing O2 needs, now requiring airvo. Colon biopsy revealed invasive adenocarcinoma. Goals of Care Determination: Patient wishes to continue aggressive treatments at present up to and including intubation, chest compressions, defibrillation. Plan:  Continue full code status. Code Status: Full code. Time spent on Advanced care Plannin minutes  Advanced Care Planning Documents: No completed advanced directives.     Minda Person, APRN - CNP  2022 8:28 AM

## 2022-06-18 NOTE — OP NOTE
Arterial Catheter Insertion Procedure Note    Procedure: Insertion of Arterial Catheter    Indications: Hypotension    Procedure Details   Informed consent was not obtained, since procedure was performed as an emergency    Under sterile conditions the skin above the right wrist  was prepped with betadine and covered with a sterile drape. Local anesthesia was applied to the skin and subcutaneous tissues. A 22-gauge needle was inserted into the Radial artery. A guide wire was then passed easily through the catheter which was then advanced with no resistance. Good pulsatile blood returned. The catheter was sutured into place. Findings: There were no changes to vital signs. Patient did tolerate procedure well. EBL: 1cc    Total time of procedure 15 minutes.

## 2022-06-18 NOTE — PROGRESS NOTES
Patient cardiac monitor reading new onset of afib. STAT EKG reveals afib. Patient asymptomatic. Hosp notified. Cardiology consulted.  Will continue to monitor

## 2022-06-18 NOTE — OP NOTE
Bronchoscopy Procedure Note    Date of Operation: 6/18/22  Pre-op Diagnosis: Plugging with blood/clots  Post-op Diagnosis: Same  Surgeon: Bebo Thompson MD  Anesthesia: General endotracheal anesthesia  Estimate Blood Loss: 618    Complications: None    Indications and History:  The patient is 79 y.o. female with profound hypoxic respiratory failure and concerns for plugging of the left lung. The risks, benefits, complications, treatment options and expected outcomes were discussed with the patient's daughter who gave us the permission. The possibilities of reaction to medication, pulmonary aspiration, perforation of a viscus, bleeding, failure to diagnose a condition and creating a complication requiring transfusion or operation were discussed with the patient who freely signed the consent. Description of Procedure: The patient was in CVU 10, identified as Dane Rocha and the procedure verified as flexible fiberoptic bronchoscopy. A time out was held and the above information confirmed. After the induction of topical nasopharyngeal anesthesia, the patient was placed in appropriate position and the bronchoscope was passed through the ET tube into the trachea. Careful inspection of the tracheal lumen was accomplished. The scope was sequentially passed into the left main and then left upper and lower bronchi and segmental bronchi. The scope was then withdrawn and advanced into the right main bronchus and then into the RUL, RML, and RLL bronchi and segmental bronchi. Endobronchial findings:   Significant amount of red-pink frothy secretions noted throughout the trachea, left mainstem, LLL, right mainstem and RLL. This was removed using the portable bronchoscope, with transient improvement in patient's oxygenation. Recommendation:     Repeat ABG and monitor O2 sats    Attestation: I performed the procedure.     Bebo Thompson MD

## 2022-06-18 NOTE — PROGRESS NOTES
Bedside bronch done per Dr. Diana Mendez. R radial arterial line place. Vasopressin added to keep MAP 60.

## 2022-06-18 NOTE — PROGRESS NOTES
Patient with increased O2 demands, now airvo + NRB with saturations in mid 80's. Unable to place on bipap due to patient frequently coughing up bloody sputum. Notified pulm.

## 2022-06-18 NOTE — PROGRESS NOTES
Patients head to toe assessment completed. Vital signs WNL, sat 90% on 10L. Patient refused bed alarm, call light within reach. Scheduled medications given per MAR. Patient denies any pain at the moment. Patient resting in bed. Will continue to monitor.

## 2022-06-18 NOTE — PROGRESS NOTES
MD Mckay Cisse MD Butler Roller, MD                                  Office: [phone]                 Fax: [phone]          [email or web address]                     NEPHROLOGY IN PATIENT PROGRESS NOTE:     PATIENT NAME: Mandi Palacios  : 1940  MRN: [id number]            Subjective:   worsening shortness of breath. Leg edema. Hypertension persisted  Hypoxia worsening, needing higher FiO2 and flow rate,  Arterial pH showing 7.4 pH, PCO2 61, PO2 85, bicarb 45,    In last 24-hours -no urine output has been reported  Since admission-net overall-5 L positive    Chest x-ray as -small bilateral pleural effusion with bilateral airspace opacity consistent with pneumonia    Had CT with IV contrast on 6-15-20 22-showing ascites also    Renal ultrasound- 2022-test unremarkable. Echo results reviewed  EF normal  Grade 1-2 diastolic dysfunction   IVC size is normal (<2.1 cm) but collapses < 50% with respiration consistent   with elevated RA pressure (8 mmHg). Estimated pulmonary artery systolic pressure is moderately elevated at 50-55   mmHg assuming a right atrial pressure of 8 mmHg. Assessment and Plan    Assessment:   Acute kidney injury. Improved to baseline. Likely due to prerenal  Hypernatremia, dehydration, increase free water deficit worsening    Alkalosis, likely combined contraction and respiratory,    Hypertension. Colon mass. Anemia. Likely of chronic disease, worsening  With acute blood loss anemia, thought to be GI loss, with focal stool positive, needing endoscopy,-showing ulcerated mass in distal ileocolic anastomosis-biopsies showing invasive adenocarcinoma.     Chronic hypoxic respiratory failure, continuously use of oxygen 3-4 L at baseline             Plan:     Low-dose IV Lasix trial  While monitoring closely for mild hypernatremia and improving JUN  Repeat labs after noon    Ongoing treatment for COPD exacerbation    Treatment of anemia, to keep hemoglobin above 7, with IV iron, now,    Follow-up with GI, cardiology, primary team  Pulm f/u for intubation     Overall guarded prognosis  Currently CODE STATUS full code-  Not ready for discharge      High complexity. Multiple medical problems. Discussed with patient 's treatment team.  hospitalist team    Thank you for allowing me to participate in this patient's care. Please do not hesitate to contact me for any questions/concerns. We will follow along with you. Omar Dangelo MD  [facility] [address]   Phone: [phone] or Via [employer]  Fax: [phone]    Severally ill, at risk of impending organ failure needing   higher level of care/monitoring. Time spent ~ 35 minutes that included face-to-face meeting/discussion with patient's treatment team (including primary/referring team and other consultants; included coordination of care with the treatment team; and review of patient's electronic medical records and ordering appropriates tests. EXAM  Vitals:    06/18/22 0533   BP:    Pulse: 99   Resp: 28   Temp:    SpO2: 95%       Intake/Output Summary (Last 24 hours) at 6/18/2022 0911  Last data filed at 6/17/2022 1435  Gross per 24 hour   Intake 365 ml   Output --   Net 365 ml     No hypotension      Principal Problem:    GI hemorrhage  Active Problems:    COPD exacerbation (HCC)    Blood loss anemia  Resolved Problems:    * No resolved hospital problems.  *        Medications Reviewed by me  Pallavi Kapadia methylPREDNISolone  40 mg IntraVENous Q6H    guaiFENesin  600 mg Oral BID    pantoprazole  40 mg Oral BID AC    budesonide  0.5 mg Nebulization BID    Arformoterol Tartrate  15 mcg Nebulization BID    ipratropium-albuterol  1 ampule Inhalation Q4H    sodium chloride flush  5-40 mL IntraVENous 2 times per day    amLODIPine  10 mg Oral Nightly    metoprolol tartrate  50 mg Oral BID      sodium chloride      sodium chloride      sodium chloride 100 mL/hr at 06/15/22 1218       General: acute respiratory distress  HENT: Atraumatic, normocephalic   Eyes: Normal conjunctiva, Non-incteral sclera. Neck: Supple, JVD not visible. CVS:  Heart sounds are normal. No loud murmur. RS: abNormal respiratory effort, Breat sound: diminished at bases. Abd: Soft , bowel sounds are normal, distension and no tenderness . Skin: No rash , some bruises,   Extremities/MSK:  Edema, no cyanosis. Data Review. Labs reviewed by me       CBC:   Recent Labs     06/16/22  0648 06/17/22  0647 06/18/22  0643   WBC 7.8 10.4 18.7*   HGB 7.5* 7.5* 7.9*   HCT 24.4* 24.8* 26.4*   MCV 79.2* 81.2 81.0   * 126* 125*     BMP:   Recent Labs     06/16/22  0648 06/17/22  0647 06/18/22  0643    145 146*   K 4.6 4.6 3.9   CL 99 100 95*   CO2 36* 37* 44*   BUN 31* 38* 43*   CREATININE 1.2 1.3* 1.2     Magnesium:   Lab Results   Component Value Date    MG 1.70 09/20/2020    MG 1.90 09/19/2020    MG 2.00 06/30/2015     Lab Results   Component Value Date    CREATININE 1.2 06/18/2022       Arterial Blood Gasses  No results for input(s): PH, PCO2, PO2 in the last 72 hours. Invalid input(s): [place]    UA:  No results for input(s): NITRITE, COLORU, PHUR, LABCAST, WBCUA, RBCUA, MUCUS, TRICHOMONAS, YEAST, BACTERIA, CLARITYU, SPECGRAV, LEUKOCYTESUR, UROBILINOGEN, BILIRUBINUR, BLOODU, GLUCOSEU, AMORPHOUS in the last 72 hours.     Invalid input(s): Cara Masters    LIVER PROFILE:   Recent Labs     06/15/22  1033   AST 45*   ALT 36   BILIDIR <0.2   BILITOT 0.8   ALKPHOS 83     PT/INR:    Lab Results   Component Value Date    PROTIME 13.3 09/19/2020    PROTIME 11.4 06/28/2015    INR 1.14 09/19/2020    INR 1.05 06/28/2015     PTT:    Lab Results   Component Value Date    APTT 26.2 09/19/2020     COCO:    Lab Results   Component Value Date    COCO Negative 09/20/2020     CHEMISTRY COMMON GROUP :   Lab Results   Component Value Date    GLUCOSE 179 06/18/2022     Recent Labs 06/15/22  1033 06/16/22  0648 06/17/22  0647 06/18/22  0643   GLUCOSE 98 138* 150* 179*   CALCIUM 9.6 8.9 8.8 9.1         RADIOLOGY:        Imaging Results.   Chest X Ray reviwed by me    Denies cough Clear

## 2022-06-18 NOTE — PROGRESS NOTES
RT called to assist with intubation. Pt provided ventilation via bag mask with 12 cmH2o peep valve. PT intubated with size 7.5 ET Tube, 21 cm lip. Tube placement confirmed by positive color change, and bilaterally breath sounds. Awaiting chest x ray to confirm. Vent settings are 18/480/100%/ +12. Will continue to monitor.

## 2022-06-18 NOTE — PROGRESS NOTES
06/18/22 1029   Vent Patient Data (Readings)   Peak Inspiratory Pressure 29 cmH2O   Mean Airway Pressure 21.9 cmH20

## 2022-06-18 NOTE — PROGRESS NOTES
Pt was having desaturations on 10L HF, pt increased to 15L still 80%. Pt coughing up red and brown phlegm. RR are 28. Pt placed on both HF and NRB both at 15L and pt saturations increased to 95%. ABG was drawn. Pt was then placed on an airvo 45L 75% and her saturations are now 96%. RN and MD made aware.

## 2022-06-18 NOTE — PROGRESS NOTES
[facility]    6/18/2022 8:10 AM        Name: Deb Anton . Admitted: 6/11/2022  Primary Care Provider: Gennaro Rhodes MD (Tel: [phone])      Chief Complaint   Patient presents with    Shortness of Breath     Using nebulizer without relief; increasing SOB; just finished abx from urgent care; brought in by [facility]. Brief History: Patient is an 80 yo female with hx COPD, chronic hypoxic respiratory failure on 3-4 liters continuously, HTN. She presented to ER with increasing shortness of breath and fatigue which have been progressively worsening over past several weeks. Found to have severe anemia with Hgb 6.2, stool guaiac positive. COVID and influenza screen negative. Procedure(s) 6/15/2022:  EGD BIOPSY  COLONOSCOPY WITH BIOPSY  COLONOSCOPY POLYPECTOMY SNARE/COLD BIOPSY  COLONOSCOPY SUBMUCOSAL/ INJECTION    Subjective: Transferred to unit overnight for worsening shortness of breath and increased O2 requirements, Now on airvo, FiO2 75%, 45 L/min, sat low to mid 90s. Bringing up blood tinged sputum. SOB with speaking. Developed new onset atrial fib, denies chest pain or palpitations.      Reviewed interval ancillary notes    Current Medications  furosemide (LASIX) tablet 20 mg, Daily  methylPREDNISolone sodium (SOLU-MEDROL) injection 40 mg, Q6H  calcium carbonate (TUMS) chewable tablet 500 mg, TID PRN  guaiFENesin (MUCINEX) extended release tablet 600 mg, BID  perflutren lipid microspheres (DEFINITY) injection 1.65 mg, ONCE PRN  0.9 % sodium chloride infusion, PRN  pantoprazole (PROTONIX) tablet 40 mg, BID AC  budesonide (PULMICORT) nebulizer suspension 500 mcg, BID  Arformoterol Tartrate (BROVANA) nebulizer solution 15 mcg, BID  albuterol (PROVENTIL) nebulizer solution 2.5 mg, Q4H PRN  ipratropium-albuterol (DUONEB) nebulizer solution 1 ampule, Q4H  0.9 % sodium chloride infusion, PRN  sodium chloride flush 0.9 % injection 5-40 mL, 2 times per day  sodium chloride flush 0.9 % injection 5-40 mL, PRN  0.9 % sodium chloride infusion, PRN  ondansetron (ZOFRAN-ODT) disintegrating tablet 4 mg, Q8H PRN   Or  ondansetron (ZOFRAN) injection 4 mg, Q6H PRN  polyethylene glycol (GLYCOLAX) packet 17 g, Daily PRN  acetaminophen (TYLENOL) tablet 650 mg, Q6H PRN   Or  acetaminophen (TYLENOL) suppository 650 mg, Q6H PRN  amLODIPine (NORVASC) tablet 10 mg, Nightly  metoprolol tartrate (LOPRESSOR) tablet 50 mg, BID  labetalol (NORMODYNE;TRANDATE) injection 5 mg, Q4H PRN      Objective:  BP (!) 154/69   Pulse 99   Temp 97.8 °F (36.6 °C) (Oral)   Resp 28   Ht 5' 2\" (1.575 m)   Wt 194 lb 1.6 oz (88 kg)   SpO2 95%   BMI 35.50 kg/m²     Intake/Output Summary (Last 24 hours) at 6/18/2022 0810  Last data filed at 6/17/2022 1435  Gross per 24 hour   Intake 365 ml   Output --   Net 365 ml      Wt Readings from Last 3 Encounters:   06/17/22 194 lb 1.6 oz (88 kg)   11/21/20 184 lb (83.5 kg)   10/02/20 180 lb (81.6 kg)     General:  Awake, alert, oriented in NAD  Skin:  Warm and dry.  + bruising to hands and arms  Neck:  Supple. No JVD appreciated  Chest:  Normal effort.   Diminished, + rhonchi  Cardiovascular:  Irregular, normal S1/S2, no murmur/gallop/rub  Abdomen:  Soft, nontender, +bowel sounds  Extremities:  No edema  Neurological: No focal deficits  Psychological: Normal mood and affect    Labs and Tests:  CBC:   Recent Labs     06/16/22  0648 06/17/22  0647 06/18/22  0643   WBC 7.8 10.4 18.7*   HGB 7.5* 7.5* 7.9*   * 126* 125*     BMP:    Recent Labs     06/16/22  0648 06/17/22  0647 06/18/22  0643    145 146*   K 4.6 4.6 3.9   CL 99 100 95*   CO2 36* 37* 44*   BUN 31* 38* 43*   CREATININE 1.2 1.3* 1.2   GLUCOSE 138* 150* 179*     Hepatic:   Recent Labs     06/15/22  1033   AST 45*   ALT 36   BILITOT 0.8   ALKPHOS 83       CXR 6/11/2022:  Chronic findings in the chest without acute airspace disease identified. CXR 6/12/2022:  Chronic findings in the chest.  Similar appearance of vascular prominence   without overt edema.         Renal Ultrasound 6/13/2021:  Unremarkable ultrasound of the kidneys and urinary bladder. EGD / Colonoscopy 6/15/2022:  Findings:             1) Small amount of adherent hematin scattered over gastric mucosa  2) Otherwise normal Esophagogastroduodenoscopy - duodenal biopsies obtained. 3) 2 - 3 cm ulcerated mass just distal to ileocolonic anastomosis - biopsied. Colon mass site marked with bao ink - total volume 2.5 mL injected at 4 sites around circumference of colon at and just distal to mass. 4) 4 mm polyp of transverse colon - D12.3 removed with cold snare  5) Four 4 mm polyps of sigmoid colon - D12.5 removed with cold snare    CT Abdomen / Pelvis 6/15/2022:  1. Mildly distended loops of small bowel in the lower abdomen without   discrete transition point, which could be due to partial obstruction or   ileus.  Clinical correlation is recommended. 2. Prior right hemicolectomy.  The remaining colon is nondilated with   scattered noninflamed diverticula. 3. Consolidation and ground-glass in the lung bases with small bilateral   pleural effusions, concerning for pneumonia. 4. Ascites. Echo 6/16/2022:  Summary   -Normal left ventricle size, wall thickness and systolic function with an estimated ejection fraction of 55-60%. -No regional wall motion abnormalities are seen. -Diastolic filling parameters suggest grade I-II diastolic   dysfunction. E/e\"=20.7.   -Mitral annular calcification is present. -Mild calcification of the mitral valve noted. -Mild mitral regurgitation.   -The aortic valve is mildly calcified with mild decreased leaflet mobility consistent with mild aortic stenosis.    -Mild to moderate tricuspid regurgitation.   -Estimated pulmonary artery systolic pressure is moderately elevated at 50-55 mmHg assuming a right atrial pressure of 8 mmHg.    CXR 6/17/2022:  Small bilateral pleural effusions with bilateral airspace opacities   consistent with pneumonia. Problem List  Principal Problem:    GI hemorrhage  Active Problems:    COPD exacerbation (HCC)    Blood loss anemia  Resolved Problems:    * No resolved hospital problems. *       Assessment & Plan:   1. Acute blood loss anemia. Hgb 6.2 on presentation and she received 1 unit PRBCs 6/11 and again 6/13. Stool occult positive. Received IV Venofer x 3. EGD/colonoscopy (6/15) with 2-3 cm ulcerated mass just distal to ileocolic anastomosis, biopsy shows invasive adenocarcinoma. Hgb stable, 7.9 this am. .     2. Colon mass. Hx of colon cancer 1990s with resection. EGD/colonoscopy (6/15) with 2-3 cm ulcerated mass just distal to ileocolic anastomosis, biopsy shows invasive adenocarcinoma. Surgery on board, will need colon resection once once respiratory status improves. GI following peripherally, recommend repeat colonoscopy for surveillance in 1 year after colon resection. 3. Acute COPD exacerbation. Respiratory panel negative, llu and COVID swabs negative. Remains on IV solumedrol, now q 6 hours. Transferred to unit overnight for increased O2 requirements, now on airvo FiO2 75% and 45 liters flow rate. Receiving MetaNebs and bringing up blood tinged sputum. Continue Brovana, Pulmicort, and q 4 hour duonebs. Procalcitonin 0.09. Pulmonary on board. 4. Acute on chronic hypoxic respiratory failure based on increased O2 requirements and increased work of breathing. Secondary to COPD exacerbation. Treatment as above. 5. Abnormal CT scan showing consolidation and ground glass in lung bases with small bilateral pleural effusions. Remains afebrile. No hx of CHF but she has received IV fluids and transfusions. Her weight is up compared to admission, proBNP 1950 and she was started on Lasix (6/16). Echo shows EF 55-60%, grade I-II diastolic dysfunction, PASP 50-55 mmHg.  BUN climbing, sodium 145, will DC Lasix. 6. HTN. Controlled. HCTZ held on admission considering JUN. Continue Norvasc and metoprolol. 7. JUN. Creatinine 1.4 on admission, baseline 0.9. Received gentle hydration, HCTZ on hold. Creatinine 1.2 today and has been stable. Nephrology on board. 8. New onset atrial fibrillation. Developed atrial fibrillation overnight, most likely secondary to acute issues. Rate reasonably controlled 90s-low 100s. Continue Lopressor. [id number] score 4 (HTN, age, gender). Poor candidate for [facility] given GI bleed from ulcerated colon mass. Cardiology consult pending. .      Discussed code status with patient and right now desires to remain full code status. She is willing to be intubated if indicated. Disposition: PT/OT evaluated and recommend SNF on DC to address ADL and functional mobility deficits but patient declines consideration. She is agreeable to home care and consult placed 6/15        Diet: ADULT DIET;  Regular  Code:Full Code  DVT PPX: scd       Sidra Conway, SHAN - CNP   6/18/2022 8:10 AM

## 2022-06-18 NOTE — PROGRESS NOTES
Code status changed to [facility] per Dr. Johnny Simeon and family discussion.

## 2022-06-18 NOTE — OP NOTE
Endotracheal Intubation Procedure Note    Indication for endotracheal intubation: Acute hypoxic respiratory failure  ASA: 5  Mallampati score: 1  Sedation: etomidate 30MG  Paralytic: rocuronium 50MG  Equipment: Glidescope 4 blade #7. 5 ET tube was secured at 21 cm at the lip. Cricoid Pressure: no  Number of attempts: 1  ETT location confirmed by by auscultation, by CXR and ETCO2 monitor.

## 2022-06-18 NOTE — PROGRESS NOTES
Patient has had significant clinical deterioration over the last 24 hours. It would appear that she has developed an exacerbation of her sleep OPD with increasing requirements for oxygen therapy. Despite exhaustive efforts patient has progressed to the point where she required intubation. The reason for her sudden decline is not entirely clear. Prior to intubation patient was noted to be awake and alert and oriented x3.   She requested that at this point I will medical modalities to be brought to bear for her care    Her blood pressure is a bit labile and she is intermittently tachycardic she remains afebrile she has diminished O2 sats    Laboratories show a sodium of 146 potassium of 3.9 chloride 95 CO2 44 BUN 43 creatinine of 1.2 glucose of 179 lactic acid of 2.8 calcium of 9 white count of 18,000 H&H of 7.9 and 26 subsequently redone and found to be 9.2 and 30 most recent arterial blood gas shows a pH of 7.34 a CO2 of 78 and a PO2 of 53 a bicarb of 42 and base excess of 14 and O2 sat of 82%    Post instant intubation chest x-ray shows endotracheal tube in good position patient has complete loss of the left hemidiaphragm with associated infiltrates in the left middle and left lower lobes of the lung    Physical examination patient is intubated and sedated    Symmetrical chest excursion with breath sounds diminished in the left lower lung field  Cardiovascular regular rate and rhythm  Abdomen is soft without apparent tenderness no present clinical situation precludes a good abdominal examination    Assessment and plan patient has an acute deterioration from a pulmonary standpoint most likely representing an exacerbation of her COPD the pulmonic infiltrates have progressed and may represent the development of an actual pneumonia at this point the surgical service will continue to monitor the patient's progress though any consideration of surgical intervention for the established adenocarcinoma in the juncture of the ilio colonic anastomosis shall be deferred until the patient becomes more clinically stable I will defer to my medical colleagues for treatment of her deterioration from a respiratory standpoint        .

## 2022-06-19 NOTE — PROGRESS NOTES
MIGUEL CALLED TO REPORT THAT SHE HAS BEEN HAVING CHEST PAIN RECENTLY. SHE IS UNSURE IF THIS IS ANXIETY OR ASTHMA RELATED. DOES DR BAILEY WANT TO SEE HER FOR THIS ISSUE OR REFILL XANAX FOR ANXIETY? SHE HAS AN UPCOMING APPT ON 11/16/2018.    CALL BACK [phone], WILL BE AT WORK UNTIL 4 PM TODAY.   Met with family and RN at bedside  Family wishes for care withdrawal and place comfort care measures. To stop pressor support and terminal extubation.

## 2022-06-19 NOTE — PROGRESS NOTES
Patient's BP declining fast, maxed on pressors, [employer] hospitalist. Family in the room aware of the situation, requesting to see [facility]. Contacted [facility], will be here shortly.

## 2022-06-19 NOTE — PROGRESS NOTES
Called to pronounce patient.   No cardiopulmonary activity  No breath sounds  No heart sounds  Pupils fixed and dilated  Pronounced dead  Time of death: 0128 hours 6/19/2022

## 2022-06-19 NOTE — DISCHARGE SUMMARY
[facility] Medicine  Death/Discharge Summary    Glen Espino       :  1940              MRN:  [id number]    Admit date:  2022    Discharge date:  2022 pronounced dead    Admitting Physician:  Lesley Henao MD  Discharge Physician: Asia Kirkpatrick MD         Admission Condition:  fair    Discharged Condition:      History of Present Illness: Patient is an 80 yo female with hx COPD, chronic hypoxic respiratory failure on 3-4 liters continuously, HTN, colon cancer s/p colectomy. She presented to ER with increasing shortness of breath and fatigue which have been progressively worsening over the past several weeks. Found to have severe anemia with Hgb 6.2, stool guaiac positive. COVID and influenza screen negative. Patient was admitted with acute blood loss anemia. She received transfusion X 2 units. EGD / colonoscopy performed  revealed ulcerated mass just distal to ileocolic anastomosis. Biopsy results positive for invasive adenocarcinoma. Surgery was consulted and colon resection planned but surgery delayed by declining respiratory status. Patient was being treated for acute COPD exacerbation. Respiratory and flu and COVID swabs negative. She received high dose IV solumedrol with minimal improvement. O2 needs continued to climb and started on Lasix without improvement. Patient underwent bronch  for profound hypoxic respiratory failure and concerns for plugging of the left lung. Found to have significant amount of bloody frothy secretions throughout which was removed using bronchoscope with transient improvement in oxygenation. Post procedure, she remained hypotensive and required 3 vasopressors. Patient's condition continued to decline with multiorgan failure. Family expressed wishes for withdrawal of care including terminal extubation and discontinuing pressor support. Patient was pronounced a short time later.     Time of Death: 0128    Cause of Death: Acute hypoxic respiratory failure    Disposition:   [facility]    Time spent on Discharge < 30 minutes      Signed:  SHAN Latif CNP, MD  6/19/2022, 7:38 AM

## 2024-11-05 NOTE — PROGRESS NOTES
Gastroenterology Progress Note            Donnie Duong is a 79 y.o. female patient. 1. Blood loss anemia    2. Hypoxia    3. COPD exacerbation ([address]    4. Anemia, unspecified type        SUBJECTIVE:  Feels ok. No new c/o. Physical    VITALS:  BP (!) 153/73   Pulse 92   Temp 98.5 °F (36.9 °C) (Oral)   Resp 18   Ht 5' 2\" (1.575 m)   Wt 194 lb 1.6 oz (88 kg)   SpO2 98%   BMI 35.50 kg/m²   TEMPERATURE:  Current - Temp: 98.5 °F (36.9 °C); Max - Temp  Av.3 °F (36.8 °C)  Min: 97.8 °F (36.6 °C)  Max: 98.7 °F (37.1 °C)    Abdomen soft, ND, NT, no HSM, Bowel sounds normal     Data      Recent Labs     06/15/22  1033 22  0648 22  0647   WBC 17.5* 7.8 10.4   HGB 8.7* 7.5* 7.5*   HCT 28.6* 24.4* 24.8*   MCV 78.1* 79.2* 81.2    108* 126*     Recent Labs     06/15/22  1033 22  0648 22  0647    141 145   K 4.4 4.6 4.6   CL 96* 99 100   CO2 37* 36* 37*   BUN 34* 31* 38*   CREATININE 1.4* 1.2 1.3*     Recent Labs     06/15/22  1033   AST 45*   ALT 36   BILIDIR <0.2   BILITOT 0.8   ALKPHOS 83     No results for input(s): LIPASE, AMYLASE in the last 72 hours. ASSESSMENT      Iron deficiency anemia - no gross GI bleeding. S/p EGD and colonoscopy 6/15. colonoscopy with 2-3 cm ulcerated mass just distal to ileocolic anastomosis. Path shows moderately-well-differentiated adenocarcinoma. CT abd/pelvis did not show metastatic disease. CEA 7.2.  hgb from 8.7-> 7.5 yesterday and stable at 7.5 today, but no gross GI bleeding. Colon cancer - involving right colon near ileocolonic anastomosis. COPD - currently on baseline 4 L O2. Pulmonary following to maximize pulm function in anticipation of colonic resection.        PLAN    - appreciate pulmonary consult and surgery eval for right colon resection   - repeat colonoscopy for surveillance 1 year after colon resection. Will follow from a distance. Please call with questions.     MD [facility]  6/17/2022 negative I have personally provided the amount of critical care time documented below concurrently with the resident/fellow.  This time excludes time spent on separate procedures and time spent teaching. I have reviewed the resident’s / fellow’s documentation and I agree with the history, exam, and assessment and plan of care.

## (undated) DEVICE — VALVE SUCTION AIR H2O SET ORCA POD + DISP

## (undated) DEVICE — APPLICATOR MEDICATED 26 CC SOLUTION HI LT ORNG CHLORAPREP

## (undated) DEVICE — SPONGE LAP W18XL18IN WHT COT 4 PLY FLD STRUNG RADPQ DISP ST

## (undated) DEVICE — SOLUTION IRRIG 1000ML 0.9% SOD CHL USP POUR PLAS BTL

## (undated) DEVICE — SEALER LAP L37CM MARYLAND JAW OPN NANO COAT MULTIFUNCTIONAL

## (undated) DEVICE — AIR/WATER CLEANING ADAPTER FOR OLYMPUS® GI ENDOSCOPE: Brand: BULLDOG®

## (undated) DEVICE — SOLUTION: ACTIFOG W/FOAM PAD 48/CS: Brand: ACTIFOG™

## (undated) DEVICE — 3M™ IOBAN™ 2 ANTIMICROBIAL INCISE DRAPE 6650EZ: Brand: IOBAN™ 2

## (undated) DEVICE — NEEDLE 25GAX5.0MM INJ CARR LOCKE

## (undated) DEVICE — TROCAR: Brand: KII FIOS FIRST ENTRY

## (undated) DEVICE — MAJOR SET UP PK

## (undated) DEVICE — BW-412T DISP COMBO CLEANING BRUSH: Brand: SINGLE USE COMBINATION CLEANING BRUSH

## (undated) DEVICE — DRAPE,LAP,CHOLE,W/TROUGHS,STERILE: Brand: MEDLINE

## (undated) DEVICE — SYRINGE, LUER LOCK, 10ML: Brand: MEDLINE

## (undated) DEVICE — HYPODERMIC SAFETY NEEDLE: Brand: MAGELLAN

## (undated) DEVICE — ELECTRODE PT RET AD L9FT HI MOIST COND ADH HYDRGEL CORDED

## (undated) DEVICE — 30977 SEE SHARP - ENHANCED INTRAOPERATIVE LAPAROSCOPE CLEANING & DEFOGGING: Brand: 30977 SEE SHARP - ENHANCED INTRAOPERATIVE LAPAROSCOPE CLEANING & DEFOGGING

## (undated) DEVICE — SNARES COLD OVAL 10MM THIN

## (undated) DEVICE — TRAP SPEC RETRV CLR PLAS POLYP IN LN SUCT QUIK CTCH

## (undated) DEVICE — [HIGH FLOW INSUFFLATOR,  DO NOT USE IF PACKAGE IS DAMAGED,  KEEP DRY,  KEEP AWAY FROM SUNLIGHT,  PROTECT FROM HEAT AND RADIOACTIVE SOURCES.]: Brand: PNEUMOSURE

## (undated) DEVICE — SUTURE MCRYL + SZ 4-0 L27IN ABSRB UD L19MM PS-2 3/8 CIR MCP426H

## (undated) DEVICE — STERILE POLYISOPRENE POWDER-FREE SURGICAL GLOVES: Brand: PROTEXIS

## (undated) DEVICE — MOUTHPIECE ENDOSCP L CTRL OPN AND SIDE PORTS DISP

## (undated) DEVICE — TOTAL TRAY, DB, 100% SILI FOLEY, 16FR 10: Brand: MEDLINE

## (undated) DEVICE — DRAPE THER FLUID WARMING 66X44 IN FLAT SLUSH DBL DISC ORS

## (undated) DEVICE — SOLUTION IV IRRIG WATER 500ML POUR BRL ST 2F7113

## (undated) DEVICE — NEEDLE INSUF L120MM DIA2MM DISP FOR PNEUMOPERI ENDOPATH

## (undated) DEVICE — FORCEPS BX L240CM WRK CHN 2.8MM STD CAP W/ NDL MIC MESH

## (undated) DEVICE — SUTURE VCRL + SZ 0 L27IN CT 3 ABSRB VCP329H

## (undated) DEVICE — SHEET,DRAPE,40X58,STERILE: Brand: MEDLINE

## (undated) DEVICE — 60 ML SYRINGE,REGULAR TIP: Brand: MONOJECT

## (undated) DEVICE — TROCAR: Brand: KII® SLEEVE

## (undated) DEVICE — Device

## (undated) DEVICE — PENCIL ES L3M BTTN SWCH S STL HEX LOK BLDE ELECTRD HOLSTER

## (undated) DEVICE — SUTURE ABSORBABLE MONOFILAMENT 0 CTX 60 IN VIO PDS + PDP990G

## (undated) DEVICE — MERCY FAIRFIELD TURNOVER KIT: Brand: MEDLINE INDUSTRIES, INC.

## (undated) DEVICE — ENDOSCOPIC KIT 6X3/16 FT COLON W/ 1.1 OZ 2 GWN W/O BRSH